# Patient Record
Sex: MALE | Race: WHITE | NOT HISPANIC OR LATINO | URBAN - METROPOLITAN AREA
[De-identification: names, ages, dates, MRNs, and addresses within clinical notes are randomized per-mention and may not be internally consistent; named-entity substitution may affect disease eponyms.]

---

## 2020-05-28 ENCOUNTER — EMERGENCY (EMERGENCY)
Facility: HOSPITAL | Age: 80
LOS: 1 days | Discharge: HOME | End: 2020-05-28
Attending: EMERGENCY MEDICINE | Admitting: EMERGENCY MEDICINE
Payer: MEDICARE

## 2020-05-28 VITALS
HEART RATE: 70 BPM | SYSTOLIC BLOOD PRESSURE: 209 MMHG | DIASTOLIC BLOOD PRESSURE: 100 MMHG | RESPIRATION RATE: 18 BRPM | OXYGEN SATURATION: 100 % | TEMPERATURE: 98 F

## 2020-05-28 VITALS — SYSTOLIC BLOOD PRESSURE: 169 MMHG | DIASTOLIC BLOOD PRESSURE: 78 MMHG

## 2020-05-28 LAB
APPEARANCE UR: CLEAR — SIGNIFICANT CHANGE UP
BACTERIA # UR AUTO: PRESENT /HPF
BILIRUB UR-MCNC: NEGATIVE — SIGNIFICANT CHANGE UP
COLOR SPEC: YELLOW — SIGNIFICANT CHANGE UP
DIFF PNL FLD: ABNORMAL
EPI CELLS # UR: SIGNIFICANT CHANGE UP /HPF (ref 0–5)
GLUCOSE UR QL: NEGATIVE — SIGNIFICANT CHANGE UP
KETONES UR-MCNC: NEGATIVE — SIGNIFICANT CHANGE UP
LEUKOCYTE ESTERASE UR-ACNC: NEGATIVE — SIGNIFICANT CHANGE UP
NITRITE UR-MCNC: NEGATIVE — SIGNIFICANT CHANGE UP
PH UR: 6 — SIGNIFICANT CHANGE UP (ref 5–8)
PROT UR-MCNC: 30 MG/DL
RBC CASTS # UR COMP ASSIST: ABNORMAL /HPF
SP GR SPEC: 1.01 — SIGNIFICANT CHANGE UP (ref 1–1.03)
UROBILINOGEN FLD QL: 0.2 E.U./DL — SIGNIFICANT CHANGE UP
WBC UR QL: < 5 /HPF — SIGNIFICANT CHANGE UP

## 2020-05-28 PROCEDURE — 99284 EMERGENCY DEPT VISIT MOD MDM: CPT

## 2020-05-28 RX ORDER — LIDOCAINE HCL 20 MG/ML
10 VIAL (ML) INJECTION ONCE
Refills: 0 | Status: COMPLETED | OUTPATIENT
Start: 2020-05-28 | End: 2020-05-28

## 2020-05-28 RX ORDER — CEFUROXIME AXETIL 250 MG
1 TABLET ORAL
Qty: 10 | Refills: 0
Start: 2020-05-28 | End: 2020-06-01

## 2020-05-28 RX ORDER — CEFUROXIME AXETIL 250 MG
250 TABLET ORAL ONCE
Refills: 0 | Status: COMPLETED | OUTPATIENT
Start: 2020-05-28 | End: 2020-05-28

## 2020-05-28 RX ADMIN — Medication 250 MILLIGRAM(S): at 04:47

## 2020-05-28 RX ADMIN — Medication 10 MILLILITER(S): at 04:26

## 2020-05-28 NOTE — ED ADULT TRIAGE NOTE - CHIEF COMPLAINT QUOTE
Pt complaining of urinary retention for the past five hours. PT states that he had a urolift procedure yesterday. Pt states that he took mirabegron for overreactive bladder which caused his blood pressure to be elevated and worsened retention. Pt took 2mg or enalapril before arrival.

## 2020-05-28 NOTE — ED PROVIDER NOTE - NSFOLLOWUPINSTRUCTIONS_ED_ALL_ED_FT
Follow up with your urologist  Take the antibiotics prescribed (Discuss with your urologist if you should continue with the antibiotics)  Return immediately for any new or worsening symptoms or any new concerns Follow up with your urologist (call today)  He/she will decide how long you need the banks catheter for  Take the antibiotics prescribed (Discuss with your urologist if you should continue with the antibiotics while having the banks cathether in)  Return immediately for any new or worsening symptoms or any new concerns

## 2020-05-28 NOTE — ED PROVIDER NOTE - PHYSICAL EXAMINATION
Physical Exam  GEN: Awake, alert, non-toxic appearing, NCAT  EYES: full EOMI,  ENT: External inspection normal, normal voice,   HEAD: atraumatic  NECK: FROM neck, supple, no meningismus, trachea midline, no JVD  RESP: no tachypnea, no hypoxia, no resp distress,  : distended bladder,   MSK: FROM all 4 extremities,

## 2020-05-28 NOTE — ED ADULT NURSE REASSESSMENT NOTE - NS ED NURSE REASSESS COMMENT FT1
Pt discharged with banks catheter in place, leg bag applied to right thigh. Teaching regarding care of leg bag and importance of urology follow up discussed. Pt verbalizes understanding, is due for follow-up tomorrow morning with urologist. Total amount of urine noted 350ml, now pink tinged. Md informed/aware.

## 2020-05-28 NOTE — ED ADULT NURSE REASSESSMENT NOTE - NS ED NURSE REASSESS COMMENT FT1
Placed indwelling Sanchez catheter, 14 [place] as per MD order. Drained 350ml clear yellow urine. Pt tolerated procedure well. BP reassessed after placement 169/78. Pt states he is more comfortable. Urine continuing to drain into collection bag. Comfort and safety measures in place and maintained.

## 2020-05-28 NOTE — ED ADULT NURSE NOTE - OBJECTIVE STATEMENT
79y male presents to ED for urinary retention. Pt states he has urinary retention for five hours PTA. Pt had urolift procedure done today. Pt took enalapril prior to arrival. Pt reporting sensation of fullness in bladder. PMH of HTN and BPH. A&Ox4.

## 2020-05-28 NOTE — ED PROVIDER NOTE - OBJECTIVE STATEMENT
79 yom pw urinary retention since around midnight.  sp urolift today.  pt also took mirabegron after procedure.  no fc, no flank pain, no nv.  hx of BPH.  reports had hematuria earlier but resolved.

## 2020-05-28 NOTE — ED PROVIDER NOTE - PATIENT PORTAL LINK FT
You can access the FollowMyHealth Patient Portal offered by [facility] by registering at the following website: [email or web address]. By joining [employer]’s FollowMyHealth portal, you will also be able to view your health information using other applications (apps) compatible with our system.

## 2020-05-29 LAB
CULTURE RESULTS: NO GROWTH — SIGNIFICANT CHANGE UP
SPECIMEN SOURCE: SIGNIFICANT CHANGE UP

## 2020-06-01 DIAGNOSIS — R33.9 RETENTION OF URINE, UNSPECIFIED: ICD-10-CM

## 2020-06-02 ENCOUNTER — EMERGENCY (EMERGENCY)
Facility: HOSPITAL | Age: 80
LOS: 1 days | Discharge: HOME | End: 2020-06-02
Attending: EMERGENCY MEDICINE | Admitting: EMERGENCY MEDICINE
Payer: MEDICARE

## 2020-06-02 VITALS
HEART RATE: 67 BPM | WEIGHT: 154.1 LBS | DIASTOLIC BLOOD PRESSURE: 107 MMHG | SYSTOLIC BLOOD PRESSURE: 176 MMHG | TEMPERATURE: 98 F | RESPIRATION RATE: 15 BRPM | OXYGEN SATURATION: 99 % | HEIGHT: 71 IN

## 2020-06-02 PROCEDURE — 99284 EMERGENCY DEPT VISIT MOD MDM: CPT

## 2020-06-02 RX ORDER — SOD SULF/SODIUM/NAHCO3/KCL/PEG
4000 SOLUTION, RECONSTITUTED, ORAL ORAL ONCE
Refills: 0 | Status: COMPLETED | OUTPATIENT
Start: 2020-06-02 | End: 2020-06-02

## 2020-06-02 RX ADMIN — Medication 4000 MILLILITER(S): at 19:21

## 2020-06-02 NOTE — ED PROVIDER NOTE - CLINICAL SUMMARY MEDICAL DECISION MAKING FREE TEXT BOX
Patient presenting with constipation x 1d. No abdo ttp. will d/c with [id number]. patient to take 1/4 of the prep. FU prn.

## 2020-06-02 NOTE — ED PROVIDER NOTE - NSFOLLOWUPINSTRUCTIONS_ED_ALL_ED_FT
Constipation    Constipation is when a person has fewer than three bowel movements a week, has difficulty having a bowel movement, or has stools that are dry, hard, or larger than normal. Other symptoms can include abdominal pain or bloating. As people grow older, constipation is more common. A low-fiber diet, not taking in enough fluids, and taking certain medicines, including opioid painkillers, may make constipation worse. Treatment varies but may include dietary modifications (more fiber-rich foods), lifestyle modifications, and possible medications.     SEEK IMMEDIATE MEDICAL CARE IF YOU HAVE ANY OF THE FOLLOWING SYMPTOMS: bright red blood in your stool, constipation for longer than 4 days, abdominal or rectal pain, unexplained weight loss, or inability to pass gas.     Please take 1/4-1/2 of the GoLYTELY bowel prep tonight.    Have a liquid dinner like soup o a smoothie.

## 2020-06-02 NOTE — ED PROVIDER NOTE - OBJECTIVE STATEMENT
79 M presenting with constipation x 1d. He normally has a large BM every day. today had a small one and a medium one and feels uncomfortable and nervous. + Bloated. Had a normal BM every ernst before today. He is s/p a Foly cath x 1d lat week for urinary ret since around midnight.  He had a Urolift last week. Now just taking Flomax and Finasteride. Says his urination os the best it has been in a year now.

## 2020-06-02 NOTE — ED ADULT TRIAGE NOTE - WEIGHT METHOD
"Chronic in nature. Stable. Patient states he sees commercials on TV for different medications for depression and wonders if he needs to switch what he is taking. Patient states he just feels \"blah\" but that's the norm. Patient denies suicidal or homicidal ideation.  " stated

## 2020-06-02 NOTE — ED PROVIDER NOTE - PATIENT PORTAL LINK FT
You can access the FollowMyHealth Patient Portal offered by [facility] by registering at the following website: [email or web address]. By joining [employer]’s FollowMyHealth portal, you will also be able to view your health information using other applications (apps) compatible with our system.

## 2020-06-03 PROBLEM — N40.0 BENIGN PROSTATIC HYPERPLASIA WITHOUT LOWER URINARY TRACT SYMPTOMS: Chronic | Status: ACTIVE | Noted: 2020-05-28

## 2020-06-06 DIAGNOSIS — Z79.899 OTHER LONG TERM (CURRENT) DRUG THERAPY: ICD-10-CM

## 2020-06-06 DIAGNOSIS — R14.0 ABDOMINAL DISTENSION (GASEOUS): ICD-10-CM

## 2020-06-06 DIAGNOSIS — K59.00 CONSTIPATION, UNSPECIFIED: ICD-10-CM

## 2020-06-06 DIAGNOSIS — R10.9 UNSPECIFIED ABDOMINAL PAIN: ICD-10-CM

## 2020-06-27 NOTE — ED ADULT NURSE NOTE - HOW OFTEN DO YOU HAVE A DRINK CONTAINING ALCOHOL?
Occupational Therapy   Initial Evaluation     Patient Name: Eb Poon  Age:  61 y.o., Sex:  male  Medical Record #: [id number]  Today's Date: 6/27/2020     Precautions  Precautions: Cardiac Precautions (See Comments)  Comments: pt's HR's in high 40's w/ activity and mid 50's at rest, baseline lightheadedness    Assessment  Pt seen post cardiac stent placement, able to demonstrate basic self care tasks, functional mobility and t/fs with supervision w/o AD, no lob noted. Discussed activity pacing and short breaks during ADLs. No further acute skilled OT services.       Plan    Recommend Occupational Therapy for Evaluation only f      Discharge recommendations:  Anticipate that the patient will have no further occupational therapy needs after discharge from the hospital.          Objective       06/27/20 1057   Prior Living Situation   Prior Services None   Housing / Facility 2 Story House   Bathroom Set up Walk In Shower;Bathtub / Shower Combination   Equipment Owned Front-Wheel Walker;Wheelchair;Tub / Shower Seat   Lives with - Patient's Self Care Capacity Spouse   Comments I with ADLs, limited mobiltiy and spouse assits as needed. Limited IADLs d/t cardiac issues    Prior Level of ADL Function   Self Feeding Independent   Grooming / Hygiene Independent   Bathing Independent   Dressing Independent   Toileting Independent   Prior Level of IADL Function   Medication Management Independent   Laundry Requires Assist   Kitchen Mobility Independent   Finances Independent   Home Management Requires Assist   Shopping Requires Assist   Prior Level Of Mobility Independent With Device in Home  (uses w/c for community)   Driving / Transportation Relatives / Others Provide Transportation   ADL Assessment   Eating Independent   Grooming Supervision;Standing   Bathing   (NT)   Upper Body Dressing Supervision   Lower Body Dressing Supervision   Toileting Supervision   Functional Mobility   Sit to Stand Supervised    Toilet Transfers Supervised   Comments c/o lightheadedness with increased activity. Per pt, this is his baseline and he knows when/how to pace it   Anticipated Discharge Equipment   DC Equipment None                  Never

## 2021-06-26 ENCOUNTER — EMERGENCY (EMERGENCY)
Facility: HOSPITAL | Age: 81
LOS: 1 days | Discharge: HOME | End: 2021-06-26
Attending: EMERGENCY MEDICINE | Admitting: EMERGENCY MEDICINE
Payer: MEDICARE

## 2021-06-26 ENCOUNTER — TRANSCRIPTION ENCOUNTER (OUTPATIENT)
Age: 81
End: 2021-06-26

## 2021-06-26 VITALS
SYSTOLIC BLOOD PRESSURE: 197 MMHG | TEMPERATURE: 98 F | OXYGEN SATURATION: 99 % | DIASTOLIC BLOOD PRESSURE: 100 MMHG | RESPIRATION RATE: 16 BRPM | HEART RATE: 57 BPM

## 2021-06-26 VITALS
OXYGEN SATURATION: 98 % | TEMPERATURE: 98 F | RESPIRATION RATE: 18 BRPM | HEART RATE: 68 BPM | DIASTOLIC BLOOD PRESSURE: 93 MMHG | SYSTOLIC BLOOD PRESSURE: 155 MMHG | WEIGHT: 132.94 LBS | HEIGHT: 71 IN

## 2021-06-26 DIAGNOSIS — R33.9 RETENTION OF URINE, UNSPECIFIED: ICD-10-CM

## 2021-06-26 DIAGNOSIS — I10 ESSENTIAL (PRIMARY) HYPERTENSION: ICD-10-CM

## 2021-06-26 DIAGNOSIS — K59.00 CONSTIPATION, UNSPECIFIED: ICD-10-CM

## 2021-06-26 DIAGNOSIS — N40.0 BENIGN PROSTATIC HYPERPLASIA WITHOUT LOWER URINARY TRACT SYMPTOMS: ICD-10-CM

## 2021-06-26 LAB
ALBUMIN SERPL ELPH-MCNC: 3.9 G/DL — SIGNIFICANT CHANGE UP (ref 3.4–5)
ALP SERPL-CCNC: 66 U/L — SIGNIFICANT CHANGE UP (ref 40–120)
ALT FLD-CCNC: 26 U/L — SIGNIFICANT CHANGE UP (ref 12–42)
ANION GAP SERPL CALC-SCNC: 4 MMOL/L — LOW (ref 9–16)
APPEARANCE UR: CLEAR — SIGNIFICANT CHANGE UP
AST SERPL-CCNC: 19 U/L — SIGNIFICANT CHANGE UP (ref 15–37)
BILIRUB SERPL-MCNC: 0.8 MG/DL — SIGNIFICANT CHANGE UP (ref 0.2–1.2)
BILIRUB UR-MCNC: NEGATIVE — SIGNIFICANT CHANGE UP
BUN SERPL-MCNC: 13 MG/DL — SIGNIFICANT CHANGE UP (ref 7–23)
CALCIUM SERPL-MCNC: 9.4 MG/DL — SIGNIFICANT CHANGE UP (ref 8.5–10.5)
CHLORIDE SERPL-SCNC: 93 MMOL/L — LOW (ref 96–108)
CO2 SERPL-SCNC: 30 MMOL/L — SIGNIFICANT CHANGE UP (ref 22–31)
COLOR SPEC: YELLOW — SIGNIFICANT CHANGE UP
CREAT SERPL-MCNC: 1.11 MG/DL — SIGNIFICANT CHANGE UP (ref 0.5–1.3)
DIFF PNL FLD: ABNORMAL
EPI CELLS # UR: SIGNIFICANT CHANGE UP /HPF (ref 0–5)
GLUCOSE SERPL-MCNC: 97 MG/DL — SIGNIFICANT CHANGE UP (ref 70–99)
GLUCOSE UR QL: NEGATIVE — SIGNIFICANT CHANGE UP
HCT VFR BLD CALC: 37.7 % — LOW (ref 39–50)
HGB BLD-MCNC: 13.6 G/DL — SIGNIFICANT CHANGE UP (ref 13–17)
KETONES UR-MCNC: NEGATIVE — SIGNIFICANT CHANGE UP
LEUKOCYTE ESTERASE UR-ACNC: NEGATIVE — SIGNIFICANT CHANGE UP
MCHC RBC-ENTMCNC: 34.1 PG — HIGH (ref 27–34)
MCHC RBC-ENTMCNC: 36.1 GM/DL — HIGH (ref 32–36)
MCV RBC AUTO: 94.5 FL — SIGNIFICANT CHANGE UP (ref 80–100)
NITRITE UR-MCNC: NEGATIVE — SIGNIFICANT CHANGE UP
NRBC # BLD: 0 /100 WBCS — SIGNIFICANT CHANGE UP (ref 0–0)
PH UR: 5.5 — SIGNIFICANT CHANGE UP (ref 5–8)
PLATELET # BLD AUTO: 215 K/UL — SIGNIFICANT CHANGE UP (ref 150–400)
POTASSIUM SERPL-MCNC: 5.1 MMOL/L — SIGNIFICANT CHANGE UP (ref 3.5–5.3)
POTASSIUM SERPL-SCNC: 5.1 MMOL/L — SIGNIFICANT CHANGE UP (ref 3.5–5.3)
PROT SERPL-MCNC: 6.6 G/DL — SIGNIFICANT CHANGE UP (ref 6.4–8.2)
PROT UR-MCNC: NEGATIVE MG/DL — SIGNIFICANT CHANGE UP
RBC # BLD: 3.99 M/UL — LOW (ref 4.2–5.8)
RBC # FLD: 11.9 % — SIGNIFICANT CHANGE UP (ref 10.3–14.5)
RBC CASTS # UR COMP ASSIST: < 5 /HPF — SIGNIFICANT CHANGE UP
SODIUM SERPL-SCNC: 127 MMOL/L — LOW (ref 132–145)
SP GR SPEC: 1.01 — SIGNIFICANT CHANGE UP (ref 1–1.03)
UROBILINOGEN FLD QL: 0.2 E.U./DL — SIGNIFICANT CHANGE UP
WBC # BLD: 6.26 K/UL — SIGNIFICANT CHANGE UP (ref 3.8–10.5)
WBC # FLD AUTO: 6.26 K/UL — SIGNIFICANT CHANGE UP (ref 3.8–10.5)

## 2021-06-26 PROCEDURE — 99284 EMERGENCY DEPT VISIT MOD MDM: CPT

## 2021-06-26 RX ORDER — POLYETHYLENE GLYCOL 3350 17 G/17G
17 POWDER, FOR SOLUTION ORAL ONCE
Refills: 0 | Status: COMPLETED | OUTPATIENT
Start: 2021-06-26 | End: 2021-06-26

## 2021-06-26 RX ORDER — SODIUM CHLORIDE 9 MG/ML
1000 INJECTION INTRAMUSCULAR; INTRAVENOUS; SUBCUTANEOUS ONCE
Refills: 0 | Status: COMPLETED | OUTPATIENT
Start: 2021-06-26 | End: 2021-06-26

## 2021-06-26 RX ORDER — PHENAZOPYRIDINE HCL 100 MG
200 TABLET ORAL ONCE
Refills: 0 | Status: COMPLETED | OUTPATIENT
Start: 2021-06-26 | End: 2021-06-26

## 2021-06-26 RX ADMIN — SODIUM CHLORIDE 1000 MILLILITER(S): 9 INJECTION INTRAMUSCULAR; INTRAVENOUS; SUBCUTANEOUS at 14:09

## 2021-06-26 RX ADMIN — POLYETHYLENE GLYCOL 3350 17 GRAM(S): 17 POWDER, FOR SOLUTION ORAL at 13:05

## 2021-06-26 RX ADMIN — Medication 200 MILLIGRAM(S): at 13:05

## 2021-06-26 NOTE — ED ADULT NURSE NOTE - NSIMPLEMENTINTERV_GEN_ALL_ED
Implemented All Universal Safety Interventions:  [place] to call system. Call bell, personal items and telephone within reach. Instruct patient to call for assistance. Room bathroom lighting operational. Non-slip footwear when patient is off stretcher. Physically safe environment: no spills, clutter or unnecessary equipment. Stretcher in lowest position, wheels locked, appropriate side rails in place.

## 2021-06-26 NOTE — ED PROVIDER NOTE - CLINICAL SUMMARY MEDICAL DECISION MAKING FREE TEXT BOX
Patient presents with constipation & urinary retention.  Labs unremarkable.  Sanchez catheter placed with moderate amount of urine.  UA ordered & urine culture.  Rectal exam done and no fecal impaction noted.  Enema given with minimal relief.

## 2021-06-26 NOTE — ED PROVIDER NOTE - GASTROINTESTINAL, MLM
Abdomen soft, non-distended.  Minimally tender suprapubic region & RLQ, but no rebound or guarding.  No CVA tenderness.

## 2021-06-26 NOTE — ED PROVIDER NOTE - OBJECTIVE STATEMENT
He states he hasn't had a BM x 2 days, and today, he is having urinary retention and hasn't voided since 7am.  He has a history of known BPH but no history of urinary retention in the past.  He does have a history of chronic constipation.  He has mild intermittent abdominal cramping and he feels a little bloated.  No hematochezia or melena.  No fevers/chills.  No nausea or vomiting but appetite has been poor. He did take a prescription muscle relaxant, which could have triggered the bladder issue.  He denies any significant neck or back pain.  No dysuria or hematuria.  No hx of KS or pyelo.

## 2021-06-26 NOTE — ED ADULT NURSE REASSESSMENT NOTE - NS ED NURSE REASSESS COMMENT FT1
Pt. given half of soap ankit enema, unable to hold in, pushed out most of the enema and asked for a break before attempting the second half.

## 2021-06-26 NOTE — ED ADULT TRIAGE NOTE - CHIEF COMPLAINT QUOTE
Patient to ED with complaint of constipation and urinary retention.  States no BM in 2 days and has not urinated since 7am

## 2021-06-26 NOTE — ED PROVIDER NOTE - NSFOLLOWUPINSTRUCTIONS_ED_ALL_ED_FT
You were treated for ACUTE URINARY RETENTION & ACUTE/CHRONIC CONSTIPATION.    Acute urinary retention is a condition in which a person is unable to pass urine. This can last for a short time or for a long time. If left untreated, it can result in kidney damage or other serious complications.     What are the causes?  This condition may be caused by:    Obstruction or narrowing of the tube that drains the bladder (urethra). This may be caused by surgery or problems with nearby organs, such as the prostate gland, which can press or squeeze the urethra.  Problems with the nerves in the bladder. These can be caused by diseases, such as multiple sclerosis, or by spinal cord injuries.  Certain medicines.  Tumors in the area of the pelvis, bladder, or urethra.  Diabetes.  Degenerative cognitive conditions such as delirium or dementia.  Bladder or urinary tract infection.  Constipation.  Blood in the urine (hematuria).  Injury to the bladder or urethra.   Psychological (psychogenic) conditions. Someone may hold his urine due to trauma or because he does not want to use the bathroom.    What increases the risk?  This condition is more likely to develop in older men. As men age, their prostate may become larger and may start pressing or squeezing on the bladder or the urethra.    What are the signs or symptoms?  Symptoms of this condition include:    Trouble urinating.  Pain in the lower abdomen.    Symptoms usually come on slowly over a long period of time.    How is this diagnosed?  This condition is diagnosed based on a physical exam and a medical history. You may also have other tests, including:    An ultrasound of the bladder or kidneys or both.  Blood tests.  A urine analysis.  Additional tests may be needed such as an MRI, kidney, or bladder function tests.    How is this treated?  Treatment for this condition may include:    Medicines.  Placing a thin, sterile tube (catheter) into the bladder to drain urine out of the body. This is called an indwelling urinary catheter. After being inserted, the catheter is held in place with a small balloon that is filled with sterile water. Urine drains from the catheter into a collection bag outside of the body.  Behavioral therapy.  Treatment for any underlying conditions.  If needed, you may be treated in the hospital for kidney function problems or to manage other complications.    Follow these instructions at home:  Take over-the-counter and prescription medicines only as told by your health care provider. Avoid certain medicines, such as decongestants, antihistamines, and some prescription medicines. Do not take any medicine unless your health care provider has approved.  If you were given an indwelling urinary catheter, take care of it as told by your health care provider.  Drink enough fluid to keep your urine clear or pale yellow.  If you were prescribed an antibiotic, take it as told by your health care provider. Do not stop taking the antibiotic even if you start to feel better.  Do not use any products that contain nicotine or tobacco, such as cigarettes and e-cigarettes. If you need help quitting, ask your health care provider.  Monitor any changes in your symptoms. Tell your health care provider about any changes.  If instructed, monitor your blood pressure at home. Report changes as told by your health care provider.  Keep all follow-up visits as told by your health care provider. This is important.    Contact a health care provider if:  You have uncomfortable bladder contractions that you cannot control (spasms) or you leak urine with the spasms.    Get help right away if:  You have chills or fever.  You have blood in your urine.  You have a catheter and:  Your catheter stops draining urine.  Your catheter falls out.    Summary  Acute urinary retention is a condition in which a person is unable to pass urine. If left untreated, it can result in kidney damage or other serious complications.  The cause of this condition may include an enlarged prostate. As men age, their prostate gland may become larger and may start pressing or squeezing on the bladder or the urethra.  Treatment for this condition may include medicines and placement of an indwelling urinary catheter.  Monitor any changes in your symptoms. Tell your health care provider about any changes.    Chronic constipation is a condition in which a person has three or fewer bowel movements a week, for three months or longer. This condition is especially common in older adults.    The two main kinds of chronic constipation are secondary constipation and functional constipation. Secondary constipation results from another condition or a treatment. Functional constipation, also called primary or idiopathic constipation, is divided into three types:     Normal transit constipation. In this type, movement of stool through the colon (stool transit) occurs normally.  Slow transit constipation. In this type, stool moves slowly through the colon.  Outlet constipation or pelvic floor dysfunction. In this type, the nerves and muscles that empty the rectum do not work normally.    What are the causes?  Causes of secondary constipation may include:    Failing to drink enough fluid, eat enough food or fiber, or get physically active.  Pregnancy.  A tear in the anus (anal fissure).  Blockage in the bowel (bowel obstruction).  Narrowing of the bowel (bowel stricture).  Having a long-term medical condition, such as:  Diabetes.  Hypothyroidism.  Multiple sclerosis.  Parkinson disease.  Stroke.  Spinal cord injury.  Dementia.  Colon cancer.  Inflammatory bowel disease (IBD).  Iron-deficiency anemia.  Outward collapse of the rectum (rectal prolapse).  Hemorrhoids.  Taking certain medicines, including:  Narcotics. These are a certain type of prescription pain medicine.  Antacids.  Iron supplements.  Water pills (diuretics).  Certain blood pressure medicines.  Anti-seizure medicines.  Antidepressants.  Medicines for Parkinson disease.    The cause of functional constipation is not known, but some conditions are associated with it. These conditions include:    Stress.  Problems in the nerves and muscles that control stool transit.  Weak or impaired pelvic floor muscles.    What increases the risk?  You may be at higher risk for chronic constipation if you:    Are older than age 70.  Are female.  Live in a long-term care facility.  Do not get much exercise or physical activity (have a sedentary lifestyle).  Do not drink enough fluids.  Do not eat enough food, especially fiber.  Have a long-term disease.  Have a mental health disorder or eating disorder.  Take many medicines.    What are the signs or symptoms?  The main symptom of chronic constipation is having three or fewer bowel movements a week for several weeks. Other signs and symptoms may vary from person to person. These include:    Pushing hard (straining) to pass stool.  Painful bowel movements.  Having hard or lumpy stools.  Having lower belly discomfort, such as cramps or bloating.  Being unable to have a bowel movement when you feel the urge.  Feeling like you still need to pass stool after a bowel movement.  Feeling that you have something in your rectum that is blocking or preventing bowel movements.  Seeing blood on the toilet paper or in your stool.  Worsening confusion (in older adults).    How is this diagnosed?  This condition may be diagnosed based on:    Symptoms and medical history. You will be asked about your symptoms, lifestyle, diet, and any medicines that you are taking.  Physical exam.   Your belly (abdomen) will be examined.   A digital rectal exam may be done. For this exam, a health care provider places a lubricated, gloved finger into the rectum.  Other tests to check for any underlying causes of your constipation. These may be ordered if you have bleeding in your rectum, weight loss, or a family history of colon cancer. In these cases, you may have:  Imaging studies of the colon. These may include X-ray, ultrasound, or CT scan.  Blood tests.  A procedure to examine the inside of your colon (colonoscopy).  More specialized tests to check:  Whether your anal sphincter works well. This is a ring-shaped muscle that controls the closing of the anus.  How well food moves through your colon.  Tests to measure the nerve signal in your pelvic floor muscles (electromyography).    How is this treated?  Treatment for chronic constipation depends on the cause. Most often, treatment starts with:    Being more active and getting regular exercise.  Drinking more fluids.  Adding fiber to your diet. Sources of fiber include fruits, vegetables, whole grains, and fiber supplements.  Using medicines such as stool softeners or medicines that increase contractions in your digestive system (pro-motility agents).  Training your pelvic muscles with biofeedback.  Surgery, if there is obstruction.    Treatment for secondary chronic constipation depends on the underlying condition. You may need to:    Stop or change some medicines if they cause constipation.  Use a fiber supplement (bulk laxative) or stool softener.  Use prescription laxative. This works by absorbing water into your colon (osmotic laxative).    You may also need to see a specialist who treats conditions of the digestive system (gastroenterologist).     Follow these instructions at home:  Take over-the-counter and prescription medicines only as told by your health care provider.  If you are taking a laxative, take it as told by your health care provider.  Eat a balanced diet that includes enough fiber. Ask your health care provider to recommend a diet that is right for you.  Drink clear fluids, especially water. Avoid drinking alcohol, caffeine, and soda.  Drink enough fluid to keep your urine pale yellow.  Get some physical activity every day. Ask your health care provider what physical activities are safe for you.   Get colon cancer screenings as told by your health care provider.  Keep all follow-up visits as told by your health care provider. This is important.     Contact a health care provider if:  You are having three or fewer bowel movements a week.  Your stools are hard or lumpy.  You notice blood on the toilet paper or in your stool after you have a bowel movement.  You have unexplained weight loss.  You have rectum (rectal) pain.  You have stool leakage.  You experience nausea or vomiting.    Get help right away if:  You have rectal bleeding or you pass blood clots.  You have severe rectal pain.  You have body tissue that pushes out (protrudes) from your anus.  You have severe pain or bloating (distension) in your abdomen.  You have vomiting that you cannot control.    Summary  Chronic constipation is a condition in which a person has three or fewer bowel movements a week, for three months or longer.  You may have a higher risk for this condition if you are an older adult, or if you do not drink enough water or get enough physical activity (are sedentary).  Treatment for this condition depends on the cause. Most treatments for chronic constipation include adding fiber to your diet, drinking more fluids, and getting more physical activity. You may also need to treat any underlying medical conditions or stop or change certain medicines if they cause constipation.  If lifestyle changes do not relieve constipation, your health care provider may recommend taking a laxative.

## 2021-06-26 NOTE — ED PROVIDER NOTE - PATIENT PORTAL LINK FT
You can access the FollowMyHealth Patient Portal offered by [facility] by registering at the following website: [email or web address]. By joining [employer]’s FollowMyHealth portal, you will also be able to view your health information using other applications (apps) compatible with our system.

## 2021-08-02 ENCOUNTER — EMERGENCY (EMERGENCY)
Facility: HOSPITAL | Age: 81
LOS: 1 days | Discharge: HOME | End: 2021-08-02
Attending: EMERGENCY MEDICINE | Admitting: EMERGENCY MEDICINE
Payer: MEDICARE

## 2021-08-02 VITALS
DIASTOLIC BLOOD PRESSURE: 96 MMHG | OXYGEN SATURATION: 99 % | HEART RATE: 44 BPM | SYSTOLIC BLOOD PRESSURE: 177 MMHG | RESPIRATION RATE: 17 BRPM | TEMPERATURE: 98 F

## 2021-08-02 VITALS
TEMPERATURE: 98 F | HEIGHT: 71 IN | RESPIRATION RATE: 18 BRPM | DIASTOLIC BLOOD PRESSURE: 110 MMHG | SYSTOLIC BLOOD PRESSURE: 185 MMHG | HEART RATE: 50 BPM | OXYGEN SATURATION: 98 % | WEIGHT: 136.03 LBS

## 2021-08-02 DIAGNOSIS — K64.9 UNSPECIFIED HEMORRHOIDS: ICD-10-CM

## 2021-08-02 DIAGNOSIS — R10.30 LOWER ABDOMINAL PAIN, UNSPECIFIED: ICD-10-CM

## 2021-08-02 DIAGNOSIS — Z86.73 PERSONAL HISTORY OF TRANSIENT ISCHEMIC ATTACK (TIA), AND CEREBRAL INFARCTION WITHOUT RESIDUAL DEFICITS: ICD-10-CM

## 2021-08-02 DIAGNOSIS — K57.90 DIVERTICULOSIS OF INTESTINE, PART UNSPECIFIED, WITHOUT PERFORATION OR ABSCESS WITHOUT BLEEDING: ICD-10-CM

## 2021-08-02 DIAGNOSIS — N40.0 BENIGN PROSTATIC HYPERPLASIA WITHOUT LOWER URINARY TRACT SYMPTOMS: ICD-10-CM

## 2021-08-02 DIAGNOSIS — K57.32 DIVERTICULITIS OF LARGE INTESTINE WITHOUT PERFORATION OR ABSCESS WITHOUT BLEEDING: ICD-10-CM

## 2021-08-02 DIAGNOSIS — I10 ESSENTIAL (PRIMARY) HYPERTENSION: ICD-10-CM

## 2021-08-02 LAB
ALBUMIN SERPL ELPH-MCNC: 3.8 G/DL — SIGNIFICANT CHANGE UP (ref 3.4–5)
ALP SERPL-CCNC: 88 U/L — SIGNIFICANT CHANGE UP (ref 40–120)
ALT FLD-CCNC: 27 U/L — SIGNIFICANT CHANGE UP (ref 12–42)
ANION GAP SERPL CALC-SCNC: 8 MMOL/L — LOW (ref 9–16)
APPEARANCE UR: CLEAR — SIGNIFICANT CHANGE UP
AST SERPL-CCNC: 31 U/L — SIGNIFICANT CHANGE UP (ref 15–37)
BASOPHILS # BLD AUTO: 0.04 K/UL — SIGNIFICANT CHANGE UP (ref 0–0.2)
BASOPHILS NFR BLD AUTO: 0.7 % — SIGNIFICANT CHANGE UP (ref 0–2)
BILIRUB SERPL-MCNC: 0.8 MG/DL — SIGNIFICANT CHANGE UP (ref 0.2–1.2)
BILIRUB UR-MCNC: NEGATIVE — SIGNIFICANT CHANGE UP
BUN SERPL-MCNC: 11 MG/DL — SIGNIFICANT CHANGE UP (ref 7–23)
CALCIUM SERPL-MCNC: 9.4 MG/DL — SIGNIFICANT CHANGE UP (ref 8.5–10.5)
CHLORIDE SERPL-SCNC: 93 MMOL/L — LOW (ref 96–108)
CO2 SERPL-SCNC: 25 MMOL/L — SIGNIFICANT CHANGE UP (ref 22–31)
COLOR SPEC: YELLOW — SIGNIFICANT CHANGE UP
CREAT SERPL-MCNC: 0.97 MG/DL — SIGNIFICANT CHANGE UP (ref 0.5–1.3)
DIFF PNL FLD: NEGATIVE — SIGNIFICANT CHANGE UP
EOSINOPHIL # BLD AUTO: 0.11 K/UL — SIGNIFICANT CHANGE UP (ref 0–0.5)
EOSINOPHIL NFR BLD AUTO: 1.8 % — SIGNIFICANT CHANGE UP (ref 0–6)
GLUCOSE SERPL-MCNC: 105 MG/DL — HIGH (ref 70–99)
GLUCOSE UR QL: NEGATIVE — SIGNIFICANT CHANGE UP
HCT VFR BLD CALC: 41.3 % — SIGNIFICANT CHANGE UP (ref 39–50)
HGB BLD-MCNC: 14.7 G/DL — SIGNIFICANT CHANGE UP (ref 13–17)
IMM GRANULOCYTES NFR BLD AUTO: 0.7 % — SIGNIFICANT CHANGE UP (ref 0–1.5)
KETONES UR-MCNC: NEGATIVE — SIGNIFICANT CHANGE UP
LEUKOCYTE ESTERASE UR-ACNC: NEGATIVE — SIGNIFICANT CHANGE UP
LIDOCAIN IGE QN: 106 U/L — SIGNIFICANT CHANGE UP (ref 73–393)
LYMPHOCYTES # BLD AUTO: 1.23 K/UL — SIGNIFICANT CHANGE UP (ref 1–3.3)
LYMPHOCYTES # BLD AUTO: 20.3 % — SIGNIFICANT CHANGE UP (ref 13–44)
MCHC RBC-ENTMCNC: 34 PG — SIGNIFICANT CHANGE UP (ref 27–34)
MCHC RBC-ENTMCNC: 35.6 GM/DL — SIGNIFICANT CHANGE UP (ref 32–36)
MCV RBC AUTO: 95.6 FL — SIGNIFICANT CHANGE UP (ref 80–100)
MONOCYTES # BLD AUTO: 0.41 K/UL — SIGNIFICANT CHANGE UP (ref 0–0.9)
MONOCYTES NFR BLD AUTO: 6.8 % — SIGNIFICANT CHANGE UP (ref 2–14)
NEUTROPHILS # BLD AUTO: 4.24 K/UL — SIGNIFICANT CHANGE UP (ref 1.8–7.4)
NEUTROPHILS NFR BLD AUTO: 69.7 % — SIGNIFICANT CHANGE UP (ref 43–77)
NITRITE UR-MCNC: NEGATIVE — SIGNIFICANT CHANGE UP
NRBC # BLD: 0 /100 WBCS — SIGNIFICANT CHANGE UP (ref 0–0)
PH UR: 7 — SIGNIFICANT CHANGE UP (ref 5–8)
PLATELET # BLD AUTO: 260 K/UL — SIGNIFICANT CHANGE UP (ref 150–400)
POTASSIUM SERPL-MCNC: 4.5 MMOL/L — SIGNIFICANT CHANGE UP (ref 3.5–5.3)
POTASSIUM SERPL-SCNC: 4.5 MMOL/L — SIGNIFICANT CHANGE UP (ref 3.5–5.3)
PROT SERPL-MCNC: 7.2 G/DL — SIGNIFICANT CHANGE UP (ref 6.4–8.2)
PROT UR-MCNC: NEGATIVE MG/DL — SIGNIFICANT CHANGE UP
RBC # BLD: 4.32 M/UL — SIGNIFICANT CHANGE UP (ref 4.2–5.8)
RBC # FLD: 12 % — SIGNIFICANT CHANGE UP (ref 10.3–14.5)
SODIUM SERPL-SCNC: 126 MMOL/L — LOW (ref 132–145)
SP GR SPEC: 1.01 — SIGNIFICANT CHANGE UP (ref 1–1.03)
UROBILINOGEN FLD QL: 0.2 E.U./DL — SIGNIFICANT CHANGE UP
WBC # BLD: 6.07 K/UL — SIGNIFICANT CHANGE UP (ref 3.8–10.5)
WBC # FLD AUTO: 6.07 K/UL — SIGNIFICANT CHANGE UP (ref 3.8–10.5)

## 2021-08-02 PROCEDURE — 99284 EMERGENCY DEPT VISIT MOD MDM: CPT

## 2021-08-02 PROCEDURE — 74177 CT ABD & PELVIS W/CONTRAST: CPT | Mod: 26

## 2021-08-02 RX ORDER — SODIUM CHLORIDE 9 MG/ML
1000 INJECTION INTRAMUSCULAR; INTRAVENOUS; SUBCUTANEOUS ONCE
Refills: 0 | Status: COMPLETED | OUTPATIENT
Start: 2021-08-02 | End: 2021-08-02

## 2021-08-02 RX ORDER — IOHEXOL 300 MG/ML
30 INJECTION, SOLUTION INTRAVENOUS ONCE
Refills: 0 | Status: COMPLETED | OUTPATIENT
Start: 2021-08-02 | End: 2021-08-02

## 2021-08-02 RX ORDER — MAGNESIUM HYDROXIDE 400 MG/1
30 TABLET, CHEWABLE ORAL ONCE
Refills: 0 | Status: COMPLETED | OUTPATIENT
Start: 2021-08-02 | End: 2021-08-02

## 2021-08-02 RX ORDER — METRONIDAZOLE 500 MG
1 TABLET ORAL
Qty: 30 | Refills: 0
Start: 2021-08-02 | End: 2021-08-11

## 2021-08-02 RX ORDER — ONDANSETRON 8 MG/1
4 TABLET, FILM COATED ORAL ONCE
Refills: 0 | Status: COMPLETED | OUTPATIENT
Start: 2021-08-02 | End: 2021-08-02

## 2021-08-02 RX ORDER — METRONIDAZOLE 500 MG
500 TABLET ORAL ONCE
Refills: 0 | Status: DISCONTINUED | OUTPATIENT
Start: 2021-08-02 | End: 2021-08-02

## 2021-08-02 RX ORDER — FAMOTIDINE 10 MG/ML
20 INJECTION INTRAVENOUS ONCE
Refills: 0 | Status: COMPLETED | OUTPATIENT
Start: 2021-08-02 | End: 2021-08-02

## 2021-08-02 RX ADMIN — FAMOTIDINE 20 MILLIGRAM(S): 10 INJECTION INTRAVENOUS at 17:01

## 2021-08-02 RX ADMIN — ONDANSETRON 4 MILLIGRAM(S): 8 TABLET, FILM COATED ORAL at 17:01

## 2021-08-02 RX ADMIN — MAGNESIUM HYDROXIDE 30 MILLILITER(S): 400 TABLET, CHEWABLE ORAL at 17:01

## 2021-08-02 RX ADMIN — IOHEXOL 30 MILLILITER(S): 300 INJECTION, SOLUTION INTRAVENOUS at 17:01

## 2021-08-02 RX ADMIN — SODIUM CHLORIDE 1000 MILLILITER(S): 9 INJECTION INTRAMUSCULAR; INTRAVENOUS; SUBCUTANEOUS at 17:03

## 2021-08-02 NOTE — ED PROVIDER NOTE - CLINICAL SUMMARY MEDICAL DECISION MAKING FREE TEXT BOX
79 y/o M presents to the ED complaining of abdominal pain today. Patient appears cachetic upon examination. Additionally, Patient has LLQ tenderness. Will order CT abdominal scan with IV and oral contrast, administer medication for pain, and order basic labs. Will re-assess.

## 2021-08-02 NOTE — ED PROVIDER NOTE - PATIENT PORTAL LINK FT
You can access the FollowMyHealth Patient Portal offered by [facility] by registering at the following website: [email or web address]. By joining [employer]’s FollowMyHealth portal, you will also be able to view your health information using other applications (apps) compatible with our system.

## 2021-08-02 NOTE — ED PROVIDER NOTE - PROGRESS NOTE DETAILS
Pt asking to leave, improved, I reviewed the results. Precautions reviewed. he will follow up with his GI in 1-2 days.

## 2021-08-02 NOTE — ED ADULT TRIAGE NOTE - CHIEF COMPLAINT QUOTE
abd pain x 3 days. Denies having a normal BM, states "had stone like poop and bright red blood yesterday."  hx of IBS

## 2021-08-02 NOTE — ED ADULT NURSE NOTE - NSIMPLEMENTINTERV_GEN_ALL_ED
Implemented All Universal Safety Interventions:  [place] to call system. Call bell, personal items and telephone within reach. Instruct patient to call for assistance. Room bathroom lighting operational. Non-slip footwear when patient is off stretcher. Physically safe environment: no spills, clutter or unnecessary equipment. Stretcher in lowest position, wheels locked, appropriate side rails in place.

## 2021-08-02 NOTE — ED PROVIDER NOTE - CONSTITUTIONAL, MLM
normal... Well appearing, awake, alert, oriented to person, place, time/situation and in no apparent distress. Patient appears cachectic.

## 2021-08-02 NOTE — ED PROVIDER NOTE - OBJECTIVE STATEMENT
79 y/o M with PMHx of AVCS, hemorrhoids, diverticulosis and HTN presents to the ED complaining of abdominal pain today. Patient reports that his last BM was yesterday, and claims there was blood in his stool. Patient states that he has severe lower abdominal pain. He denies diarrhea, nausea, vomiting, fever, or chills. Patient endorses weight loss from 155 lbs to 133 lbs during this past year. Patient states he is compliant with all of his prescribed medication and denies taking blood thinners.

## 2021-08-21 ENCOUNTER — EMERGENCY (EMERGENCY)
Facility: HOSPITAL | Age: 81
LOS: 1 days | Discharge: HOME | End: 2021-08-21
Attending: EMERGENCY MEDICINE | Admitting: EMERGENCY MEDICINE
Payer: MEDICARE

## 2021-08-21 VITALS
OXYGEN SATURATION: 98 % | SYSTOLIC BLOOD PRESSURE: 154 MMHG | WEIGHT: 132.94 LBS | TEMPERATURE: 98 F | HEART RATE: 65 BPM | DIASTOLIC BLOOD PRESSURE: 85 MMHG | RESPIRATION RATE: 18 BRPM | HEIGHT: 71 IN

## 2021-08-21 DIAGNOSIS — I10 ESSENTIAL (PRIMARY) HYPERTENSION: ICD-10-CM

## 2021-08-21 DIAGNOSIS — Z79.83 LONG TERM (CURRENT) USE OF BISPHOSPHONATES: ICD-10-CM

## 2021-08-21 DIAGNOSIS — R33.9 RETENTION OF URINE, UNSPECIFIED: ICD-10-CM

## 2021-08-21 DIAGNOSIS — Z90.49 ACQUIRED ABSENCE OF OTHER SPECIFIED PARTS OF DIGESTIVE TRACT: ICD-10-CM

## 2021-08-21 DIAGNOSIS — N04.1 NEPHROTIC SYNDROME WITH FOCAL AND SEGMENTAL GLOMERULAR LESIONS: ICD-10-CM

## 2021-08-21 PROBLEM — K57.90 DIVERTICULOSIS OF INTESTINE, PART UNSPECIFIED, WITHOUT PERFORATION OR ABSCESS WITHOUT BLEEDING: Chronic | Status: ACTIVE | Noted: 2021-08-02

## 2021-08-21 PROBLEM — K64.9 UNSPECIFIED HEMORRHOIDS: Chronic | Status: ACTIVE | Noted: 2021-08-02

## 2021-08-21 LAB
APPEARANCE UR: CLEAR — SIGNIFICANT CHANGE UP
BILIRUB UR-MCNC: NEGATIVE — SIGNIFICANT CHANGE UP
COLOR SPEC: YELLOW — SIGNIFICANT CHANGE UP
DIFF PNL FLD: NEGATIVE — SIGNIFICANT CHANGE UP
GLUCOSE UR QL: NEGATIVE — SIGNIFICANT CHANGE UP
KETONES UR-MCNC: NEGATIVE — SIGNIFICANT CHANGE UP
LEUKOCYTE ESTERASE UR-ACNC: NEGATIVE — SIGNIFICANT CHANGE UP
NITRITE UR-MCNC: NEGATIVE — SIGNIFICANT CHANGE UP
PH UR: 7 — SIGNIFICANT CHANGE UP (ref 5–8)
PROT UR-MCNC: NEGATIVE MG/DL — SIGNIFICANT CHANGE UP
SP GR SPEC: 1.01 — SIGNIFICANT CHANGE UP (ref 1–1.03)
UROBILINOGEN FLD QL: 0.2 E.U./DL — SIGNIFICANT CHANGE UP

## 2021-08-21 PROCEDURE — 99283 EMERGENCY DEPT VISIT LOW MDM: CPT

## 2021-08-21 NOTE — ED PROVIDER NOTE - CLINICAL SUMMARY MEDICAL DECISION MAKING FREE TEXT BOX
Patient presents with recurrent urinary retention in the setting of chronic BPH.  Straight cath done, but patient did not want indwelling catheter.  He feels like he will still be able to void on his own, and he will follow up with Urology ASAP.  UA without evidence of UTI.

## 2021-08-21 NOTE — ED PROVIDER NOTE - NSICDXPASTMEDICALHX_GEN_ALL_CORE_FT
Accompanied by parent    Kike is a 5 year old female who presents to walk in [facility] with complains of upper respiratory symptoms.  Symptoms have been present for for 2 days.  Main symptoms include cough, and some congestion and drainage.  Denies high fever, significant headache, no visual complaints, no hemoptysis or wheezing.  Patient denies any chest pains or shortness of breath.  Not responding to OTC cough and cold medications.  Normal development  Vaccinations UTD    Review of systems otherwise negative    PMH:   Patient Active Problem List   Diagnosis   • Bronchospasm   • Chronic nonallergic rhinitis   • Recurrent sinusitis   • Rectal prolapse   • Speech delay   • Adenoid hypertrophy   • ETD (Eustachian tube dysfunction), bilateral   • GERD (gastroesophageal reflux disease)   • Hearing loss   • OME (otitis media with effusion)   • Snoring   • Mild intermittent asthma   • Mild anemia     Current Outpatient Medications   Medication Sig Dispense Refill   • methylphenidate (Metadate CD) 10 MG CR capsule Take 1 capsule by mouth every morning. 30 capsule 0   • methylphenidate 5 MG chewable tablet Take 1 chew tab in the afternoon as needed 30 tablet 0   • fluticasone-salmeterol (ADVAIR HFA) 115-21 MCG/ACT inhaler Inhale 2 puffs into the lungs 2 times daily. 3 Inhaler 1   • PROAIR  (90 Base) MCG/ACT inhaler INHALE 2 PUFFS BY MOUTH EVERY 4 HOURS AS NEEDED FOR WHEEZING, SHORTNESS OF BREATH OR COUGH 1 Inhaler 0   • albuterol (VENTOLIN) (2.5 MG/3ML) 0.083% nebulizer solution Take 3 mLs by nebulization daily. 75 mL 3   • Spacer/Aero-Hold Chamber Mask Misc Please dispense the spacer and mask best covered by insurance. 1 Device 1   • ipratropium (ATROVENT) 0.06 % nasal spray Spray 1 spray in each nostril every 6 hours as needed for Rhinitis. 15 mL 0   • triamcinolone (ARISTOCORT) 0.1 % ointment Apply twice daily as needed up to 2 weeks consecutively in non-sensitive, affected skin areas. 30 g 0   • ibuprofen  (MOTRIN,ADVIL) 100 MG/5ML suspension Take 150 mg by mouth. - Oral       No current facility-administered medications for this visit.          Objective  Visit Vitals  Pulse 94   Temp 98.2 °F (36.8 °C) (Tympanic)   Resp 20   Wt 23.6 kg (52 lb)   SpO2 94%     No acute distress, nontoxic in appearance   CN 2-12 intact  TM's clear Conjunctiva-clear  Nasal mucosa- erythematous, turbinates swollen,   Oropharynx- red, MMM, airways patent, mild tonsillar swelling  Small anterior cervical lymphadenopathy, neck supple.  No meningismus.  No nasal flaring or grunting no use of accessory muscles of respiration  Lungs good air entry bilaterally  CVS- S1,S2 present RRR, no rubs gallops or murmurs    Skin - good tissue turgor and capillary refill, no rash      Assesement and plan  Viral URI- RULE OUT covid    SWAB OBTAINED    Fever control and OTC symptomatic treatment    Fever:................................ Tylenol for fever (10-15mg/kg q4-6 hours), Ibuprofen for fever (5-10mg/kg) q 6-8hrs, Call if develops stiff neck, difficulty breathing, lethargy, etc. and Call if fever lasts longer then 3 days.    Side effects of all medications were discussed.  Patient is instructed to follow up in 2-3 days or as needed.  Patient is to go to the emergency room for any significant change or worsening.  Patient was discharged in a stable condition and was in agreement with the plan.  No further questions.    Visit Vitals  Pulse 94   Temp 98.2 °F (36.8 °C) (Tympanic)   Resp 20   Wt 23.6 kg (52 lb)   SpO2 94%          PAST MEDICAL HISTORY:  BPH (benign prostatic hyperplasia)     Diverticulosis     Hemorrhoids     Hypertension

## 2021-08-21 NOTE — ED PROVIDER NOTE - NSFOLLOWUPINSTRUCTIONS_ED_ALL_ED_FT
You were seen in the ED today for Acute Urinary Retention.    Acute urinary retention is a condition in which a person is unable to pass urine. This can last for a short time or for a long time. If left untreated, it can result in kidney damage or other serious complications.     What are the causes?  This condition may be caused by:    Obstruction or narrowing of the tube that drains the bladder (urethra). This may be caused by surgery or problems with nearby organs, such as the prostate gland, which can press or squeeze the urethra.  Problems with the nerves in the bladder. These can be caused by diseases, such as multiple sclerosis, or by spinal cord injuries.  Certain medicines.  Tumors in the area of the pelvis, bladder, or urethra.  Diabetes.  Degenerative cognitive conditions such as delirium or dementia.  Bladder or urinary tract infection.  Constipation.  Blood in the urine (hematuria).  Injury to the bladder or urethra.   Psychological (psychogenic) conditions. Someone may hold his urine due to trauma or because he does not want to use the bathroom.    What increases the risk?  This condition is more likely to develop in older men. As men age, their prostate may become larger and may start pressing or squeezing on the bladder or the urethra.    What are the signs or symptoms?  Symptoms of this condition include:    Trouble urinating.  Pain in the lower abdomen.    Symptoms usually come on slowly over a long period of time.    How is this diagnosed?  This condition is diagnosed based on a physical exam and a medical history. You may also have other tests, including:    An ultrasound of the bladder or kidneys or both.  Blood tests.  A urine analysis.  Additional tests may be needed such as an MRI, kidney, or bladder function tests.    How is this treated?  Treatment for this condition may include:    Medicines.  Placing a thin, sterile tube (catheter) into the bladder to drain urine out of the body. This is called an indwelling urinary catheter. After being inserted, the catheter is held in place with a small balloon that is filled with sterile water. Urine drains from the catheter into a collection bag outside of the body.  Behavioral therapy.  Treatment for any underlying conditions.  If needed, you may be treated in the hospital for kidney function problems or to manage other complications.    Follow these instructions at home:  Take over-the-counter and prescription medicines only as told by your health care provider. Avoid certain medicines, such as decongestants, antihistamines, and some prescription medicines. Do not take any medicine unless your health care provider has approved.  If you were given an indwelling urinary catheter, take care of it as told by your health care provider.  Drink enough fluid to keep your urine clear or pale yellow.  If you were prescribed an antibiotic, take it as told by your health care provider. Do not stop taking the antibiotic even if you start to feel better.  Do not use any products that contain nicotine or tobacco, such as cigarettes and e-cigarettes. If you need help quitting, ask your health care provider.  Monitor any changes in your symptoms. Tell your health care provider about any changes.  If instructed, monitor your blood pressure at home. Report changes as told by your health care provider.  Keep all follow-up visits as told by your health care provider. This is important.    Contact a health care provider if:  You have uncomfortable bladder contractions that you cannot control (spasms) or you leak urine with the spasms.    Get help right away if:  You have chills or fever.  You have blood in your urine.  You have a catheter and:  Your catheter stops draining urine.  Your catheter falls out.    Summary  Acute urinary retention is a condition in which a person is unable to pass urine. If left untreated, it can result in kidney damage or other serious complications.  The cause of this condition may include an enlarged prostate. As men age, their prostate gland may become larger and may start pressing or squeezing on the bladder or the urethra.  Treatment for this condition may include medicines and placement of an indwelling urinary catheter.  Monitor any changes in your symptoms. Tell your health care provider about any changes.

## 2021-08-21 NOTE — ED PROVIDER NOTE - PATIENT PORTAL LINK FT
You can access the FollowMyHealth Patient Portal offered by [facility] by registering at the following website: [email or web address]. By joining [employer]’s FollowMyHealth portal, you will also be able to view your health information using other applications (apps) compatible with our system.

## 2021-08-21 NOTE — ED PROVIDER NOTE - OBJECTIVE STATEMENT
He has a history of BPH S/P NeoTract insertion in May of this year (which he states did not work).  He has had recurrent bouts of urinary retention, one of which led to an ED visit here previously, and I happened to be his provider then as well.  He states he hasn't emptied his bladder in 8-12 hours, and he complains of suprapubic fullness/pressure.  No significant abdominal pain.  No N/V.  No F/C.  No dysuria or hematuria.  BMs have been normal, but he usually suffers from chronic IBS-C and is often constipated.  He sees a urologist at a facility outside of [facility].  He feels that he needs a catheter, but he does not want to leave it in.  He has never been taught to self-cath at home on a PRN basis in the past.  He is already on Flomax, which he takes QHS, but he took an extra one around 4am last night.  No new medications.  No OTC antihistamines.

## 2021-08-21 NOTE — ED PROVIDER NOTE - GASTROINTESTINAL, MLM
Abdomen soft, minimally tender over suprapubic region with distended bladder (also seen on ultrasound), but no rebound and, no guarding.

## 2021-08-22 ENCOUNTER — EMERGENCY (EMERGENCY)
Facility: HOSPITAL | Age: 81
LOS: 1 days | Discharge: HOME | End: 2021-08-22
Attending: EMERGENCY MEDICINE | Admitting: EMERGENCY MEDICINE
Payer: MEDICARE

## 2021-08-22 VITALS
RESPIRATION RATE: 18 BRPM | HEIGHT: 71 IN | SYSTOLIC BLOOD PRESSURE: 143 MMHG | OXYGEN SATURATION: 98 % | WEIGHT: 132.94 LBS | HEART RATE: 65 BPM | DIASTOLIC BLOOD PRESSURE: 89 MMHG | TEMPERATURE: 98 F

## 2021-08-22 DIAGNOSIS — Z90.49 ACQUIRED ABSENCE OF OTHER SPECIFIED PARTS OF DIGESTIVE TRACT: ICD-10-CM

## 2021-08-22 DIAGNOSIS — Z79.83 LONG TERM (CURRENT) USE OF BISPHOSPHONATES: ICD-10-CM

## 2021-08-22 DIAGNOSIS — N40.1 BENIGN PROSTATIC HYPERPLASIA WITH LOWER URINARY TRACT SYMPTOMS: ICD-10-CM

## 2021-08-22 DIAGNOSIS — R33.9 RETENTION OF URINE, UNSPECIFIED: ICD-10-CM

## 2021-08-22 DIAGNOSIS — K52.9 NONINFECTIVE GASTROENTERITIS AND COLITIS, UNSPECIFIED: ICD-10-CM

## 2021-08-22 DIAGNOSIS — I10 ESSENTIAL (PRIMARY) HYPERTENSION: ICD-10-CM

## 2021-08-22 PROCEDURE — 99284 EMERGENCY DEPT VISIT MOD MDM: CPT

## 2021-08-22 NOTE — ED PROVIDER NOTE - CLINICAL SUMMARY MEDICAL DECISION MAKING FREE TEXT BOX
Patient with intermittent urinary retention complaining of no urination for the past 4.5 hours. Not in distress. VS normal. Bladder not distended. I have low suspicion for obstructive uropathy as a result of urinary retention and do not think labwork is necessary. Initially patient agreed, reluctantly, to have Sanchez catheter placed by RN, but then walked to the bathroom and urinated spontaneously and now feels comfortable going home. He will see his urologist tomorrow for follow-up and will return to ED if problem recurs.

## 2021-08-22 NOTE — ED PROVIDER NOTE - NSFOLLOWUPINSTRUCTIONS_ED_ALL_ED_FT
Continue taking your usual medications, drink plenty of water, and try to empty bladder regularly. If you have any problems with urination or anything else, please feel free to come back.

## 2021-08-22 NOTE — ED PROVIDER NOTE - PATIENT PORTAL LINK FT
You can access the FollowMyHealth Patient Portal offered by [facility] by registering at the following website: [email or web address]. By joining [employer]’s FollowMyHealth portal, you will also be able to view your health information using other applications (apps) compatible with our system.

## 2021-08-22 NOTE — ED ADULT NURSE NOTE - OBJECTIVE STATEMENT
c/o urinary retention since this am, seen here yesterday for the same issue, bladder distended. No s/s of distress, awaiting provider eval

## 2021-08-22 NOTE — ED PROVIDER NOTE - CARE PROVIDER_API CALL
Mann Morales  Specialist  [address] [place] [address] 6  [place], [place] [id number]  Phone: [phone]  Fax: [phone]  Follow Up Time:

## 2021-08-22 NOTE — ED PROVIDER NOTE - OBJECTIVE STATEMENT
80-year-old man with past medical history of IBS-C, diverticulosis, and BPH on flomax and finasteride who was seen in the ED yesterday complaining of urinary retention and was ultimately discharged without a catheter who now presents to the ED again complaining of inability to void urine since 4:40am today. He had voided once per hour since leaving the ED yesterday and felt fine until flow "suddenly cut off" early this morning. He is not having suprapubic discomfort or flank pain. He follows with a urologist who he plans to see tomorrow about this problem.

## 2021-08-26 ENCOUNTER — EMERGENCY (EMERGENCY)
Facility: HOSPITAL | Age: 81
LOS: 1 days | Discharge: HOME | End: 2021-08-26
Attending: EMERGENCY MEDICINE | Admitting: EMERGENCY MEDICINE
Payer: MEDICARE

## 2021-08-26 VITALS
RESPIRATION RATE: 18 BRPM | SYSTOLIC BLOOD PRESSURE: 183 MMHG | DIASTOLIC BLOOD PRESSURE: 96 MMHG | OXYGEN SATURATION: 100 % | HEART RATE: 58 BPM | TEMPERATURE: 98 F

## 2021-08-26 VITALS
DIASTOLIC BLOOD PRESSURE: 82 MMHG | SYSTOLIC BLOOD PRESSURE: 124 MMHG | RESPIRATION RATE: 18 BRPM | WEIGHT: 164.91 LBS | OXYGEN SATURATION: 98 % | HEART RATE: 71 BPM | HEIGHT: 71 IN | TEMPERATURE: 98 F

## 2021-08-26 DIAGNOSIS — N40.1 BENIGN PROSTATIC HYPERPLASIA WITH LOWER URINARY TRACT SYMPTOMS: ICD-10-CM

## 2021-08-26 DIAGNOSIS — Z90.49 ACQUIRED ABSENCE OF OTHER SPECIFIED PARTS OF DIGESTIVE TRACT: ICD-10-CM

## 2021-08-26 DIAGNOSIS — K59.00 CONSTIPATION, UNSPECIFIED: ICD-10-CM

## 2021-08-26 DIAGNOSIS — K52.9 NONINFECTIVE GASTROENTERITIS AND COLITIS, UNSPECIFIED: ICD-10-CM

## 2021-08-26 DIAGNOSIS — K57.30 DIVERTICULOSIS OF LARGE INTESTINE WITHOUT PERFORATION OR ABSCESS WITHOUT BLEEDING: ICD-10-CM

## 2021-08-26 DIAGNOSIS — I10 ESSENTIAL (PRIMARY) HYPERTENSION: ICD-10-CM

## 2021-08-26 LAB
ALBUMIN SERPL ELPH-MCNC: 3.5 G/DL — SIGNIFICANT CHANGE UP (ref 3.4–5)
ALP SERPL-CCNC: 75 U/L — SIGNIFICANT CHANGE UP (ref 40–120)
ALT FLD-CCNC: 24 U/L — SIGNIFICANT CHANGE UP (ref 12–42)
ANION GAP SERPL CALC-SCNC: 6 MMOL/L — LOW (ref 9–16)
AST SERPL-CCNC: 17 U/L — SIGNIFICANT CHANGE UP (ref 15–37)
BASOPHILS # BLD AUTO: 0.02 K/UL — SIGNIFICANT CHANGE UP (ref 0–0.2)
BASOPHILS NFR BLD AUTO: 0.4 % — SIGNIFICANT CHANGE UP (ref 0–2)
BILIRUB SERPL-MCNC: 0.9 MG/DL — SIGNIFICANT CHANGE UP (ref 0.2–1.2)
BUN SERPL-MCNC: 12 MG/DL — SIGNIFICANT CHANGE UP (ref 7–23)
CALCIUM SERPL-MCNC: 8.9 MG/DL — SIGNIFICANT CHANGE UP (ref 8.5–10.5)
CHLORIDE SERPL-SCNC: 93 MMOL/L — LOW (ref 96–108)
CO2 SERPL-SCNC: 29 MMOL/L — SIGNIFICANT CHANGE UP (ref 22–31)
CREAT SERPL-MCNC: 1.04 MG/DL — SIGNIFICANT CHANGE UP (ref 0.5–1.3)
EOSINOPHIL # BLD AUTO: 0.06 K/UL — SIGNIFICANT CHANGE UP (ref 0–0.5)
EOSINOPHIL NFR BLD AUTO: 1.2 % — SIGNIFICANT CHANGE UP (ref 0–6)
GLUCOSE SERPL-MCNC: 93 MG/DL — SIGNIFICANT CHANGE UP (ref 70–99)
HCT VFR BLD CALC: 34.4 % — LOW (ref 39–50)
HGB BLD-MCNC: 12.3 G/DL — LOW (ref 13–17)
IMM GRANULOCYTES NFR BLD AUTO: 0.6 % — SIGNIFICANT CHANGE UP (ref 0–1.5)
LYMPHOCYTES # BLD AUTO: 1.16 K/UL — SIGNIFICANT CHANGE UP (ref 1–3.3)
LYMPHOCYTES # BLD AUTO: 22.3 % — SIGNIFICANT CHANGE UP (ref 13–44)
MCHC RBC-ENTMCNC: 34 PG — SIGNIFICANT CHANGE UP (ref 27–34)
MCHC RBC-ENTMCNC: 35.8 GM/DL — SIGNIFICANT CHANGE UP (ref 32–36)
MCV RBC AUTO: 95 FL — SIGNIFICANT CHANGE UP (ref 80–100)
MONOCYTES # BLD AUTO: 0.32 K/UL — SIGNIFICANT CHANGE UP (ref 0–0.9)
MONOCYTES NFR BLD AUTO: 6.1 % — SIGNIFICANT CHANGE UP (ref 2–14)
NEUTROPHILS # BLD AUTO: 3.62 K/UL — SIGNIFICANT CHANGE UP (ref 1.8–7.4)
NEUTROPHILS NFR BLD AUTO: 69.4 % — SIGNIFICANT CHANGE UP (ref 43–77)
NRBC # BLD: 0 /100 WBCS — SIGNIFICANT CHANGE UP (ref 0–0)
PLATELET # BLD AUTO: 228 K/UL — SIGNIFICANT CHANGE UP (ref 150–400)
POTASSIUM SERPL-MCNC: 4.5 MMOL/L — SIGNIFICANT CHANGE UP (ref 3.5–5.3)
POTASSIUM SERPL-SCNC: 4.5 MMOL/L — SIGNIFICANT CHANGE UP (ref 3.5–5.3)
PROT SERPL-MCNC: 6.2 G/DL — LOW (ref 6.4–8.2)
RBC # BLD: 3.62 M/UL — LOW (ref 4.2–5.8)
RBC # FLD: 12.3 % — SIGNIFICANT CHANGE UP (ref 10.3–14.5)
SODIUM SERPL-SCNC: 128 MMOL/L — LOW (ref 132–145)
WBC # BLD: 5.21 K/UL — SIGNIFICANT CHANGE UP (ref 3.8–10.5)
WBC # FLD AUTO: 5.21 K/UL — SIGNIFICANT CHANGE UP (ref 3.8–10.5)

## 2021-08-26 PROCEDURE — 74019 RADEX ABDOMEN 2 VIEWS: CPT | Mod: 26

## 2021-08-26 PROCEDURE — 99284 EMERGENCY DEPT VISIT MOD MDM: CPT

## 2021-08-26 RX ORDER — MULTIVIT WITH MIN/MFOLATE/K2 340-15/3 G
1 POWDER (GRAM) ORAL ONCE
Refills: 0 | Status: COMPLETED | OUTPATIENT
Start: 2021-08-26 | End: 2021-08-26

## 2021-08-26 RX ORDER — POLYETHYLENE GLYCOL 3350 17 G/17G
17 POWDER, FOR SOLUTION ORAL
Qty: 476 | Refills: 0
Start: 2021-08-26 | End: 2021-09-08

## 2021-08-26 RX ADMIN — Medication 1 BOTTLE: at 10:30

## 2021-08-26 NOTE — ED PROVIDER NOTE - NSICDXPASTMEDICALHX_GEN_ALL_CORE_FT
PAST MEDICAL HISTORY:  BPH (benign prostatic hyperplasia)     Diverticulosis     Hemorrhoids     Hypertension       IBD (inflammatory bowel disease)

## 2021-08-26 NOTE — ED PROVIDER NOTE - CLINICAL SUMMARY MEDICAL DECISION MAKING FREE TEXT BOX
81 y/o M with Hx of BPH, hypertension, and IBD with constipation [numerous ED visits in the past for constipation] presenting with 3 days of constipation. Pt does not have any symptoms to suggest SBO at this time. On exam, Abd is soft, non-tender, and non-distended, Will check plain films of Abd to evaluate gas patterns. Will treat for constipation and reassess afterwards. 79 y/o M with Hx of BPH, hypertension, and IBD with constipation [numerous ED visits in the past for constipation] presenting with 3 days of constipation. Pt does not have any symptoms to suggest SBO at this time. On exam, Abd is soft, non-tender, and non-distended, Will check plain films of Abd to evaluate gas patterns. Will treat for constipation and reassess afterwards.    XR with nonobstructive bowel gas pattern.  Pt had large BM after enema and mag citrate.  Pt feels better and wants to go home.  Return precautions discussed.  Discussed high fiber diet and increased PO hydration, regular use of miralax with pt.  Stable for dc home.

## 2021-08-26 NOTE — ED PROVIDER NOTE - OBJECTIVE STATEMENT
81 y/o M with PMHx of BPH, diverticulosis, hypertension, appendicitis [s/p appendectomy] and IBD with constipation [numerous ED visits in the past], presents to the ED for 3 days of constipation. Pt reports he had 2 small hard "partial" bowel movements before his symptoms began. He states he usually has daily bowel movements. Pt began having constipation on 8/24/21. He was recommended to use Miralax by his PCP at the [facility]. However, Pt states he only used Miralax for one day and did not have any relief of symptoms. Yesterday, Pt tried using Lactulose but was still unable to have a bowel movement. He noted he was "very gassy" last night. Pt spoke to his PCP this morning and was advised to come to the ED for an enema. Pt endorses feeling weak, disoriented, and nauseous. He denies fevers, chills, or vomiting. Of note, Pt was seen at this ED a few days ago for urinary retention which he endorses has resolved [Pt did not have a catheter placed at that time].

## 2021-08-26 NOTE — ED PROVIDER NOTE - PATIENT PORTAL LINK FT
You can access the FollowMyHealth Patient Portal offered by [facility] by registering at the following website: [email or web address]. By joining [employer]’s FollowMyHealth portal, you will also be able to view your health information using other applications (apps) compatible with our system.

## 2021-08-26 NOTE — ED PROVIDER NOTE - NSFOLLOWUPINSTRUCTIONS_ED_ALL_ED_FT
Constipation    WHAT YOU NEED TO KNOW:    Constipation means you have hard, dry bowel movements, or you go longer than usual between bowel movements.    DISCHARGE INSTRUCTIONS:    Call your doctor if:   •You have blood in your bowel movements.      •You have a fever and abdominal pain with the constipation.      •Your constipation gets worse.      •You start to vomit.      •You have questions or concerns about your condition or care.      Medicines:   •Medicine such as a laxative may help relax and loosen your intestines to help you have a bowel movement. Your provider may recommend you only use laxatives for a short time. Long-term use may make your bowels dependent on the medicine.      •Take your medicine as directed. Contact your healthcare provider if you think your medicine is not helping or if you have side effects. Tell him of her if you are allergic to any medicine. Keep a list of the medicines, vitamins, and herbs you take. Include the amounts, and when and why you take them. Bring the list or the pill bottles to follow-up visits. Carry your medicine list with you in case of an emergency.      Relieve constipation:   •A suppository may be used to help soften your bowel movements. This may make them easier to pass. A suppository is guided into your rectum through your anus.  Suppository for Constipation           •An enema is liquid medicine used to clear bowel movement from your rectum. The medicine is put into your rectum through your anus.  Enemas           Prevent constipation:   •Drink liquids as directed. You may need to drink extra liquids to help soften and move your bowels. Ask how much liquid to drink each day and which liquids are best for you.      •Eat high-fiber foods. This may help decrease constipation by adding bulk to your bowel movements. High-fiber foods include fruit, vegetables, whole-grain breads and cereals, and beans. Your healthcare provider or dietitian can help you create a high-fiber meal plan. Your provider may also recommend a fiber supplement if you cannot get enough fiber from food.             •Exercise regularly. Regular physical activity can help stimulate your intestines. Walking is a good exercise to prevent or relieve constipation. Ask which exercises are best for you.  [employer] Walking for Exercise           •Schedule a time each day to have a bowel movement. This may help train your body to have regular bowel movements. Bend forward while you are on the toilet to help move the bowel movement out. Sit on the toilet for at least 10 minutes, even if you do not have a bowel movement.      •Talk to your healthcare provider about your medicines. Certain medicines, such as opioids, can cause constipation. Your provider may be able to make medicine changes. For example, he or she may change the kind of medicine, or change when you take it.      Follow up with your doctor as directed: Write down your questions so you remember to ask them during your visits.

## 2021-08-28 DIAGNOSIS — Z90.49 ACQUIRED ABSENCE OF OTHER SPECIFIED PARTS OF DIGESTIVE TRACT: Chronic | ICD-10-CM

## 2021-11-22 ENCOUNTER — EMERGENCY (EMERGENCY)
Facility: HOSPITAL | Age: 81
LOS: 1 days | Discharge: HOME | End: 2021-11-22
Admitting: EMERGENCY MEDICINE
Payer: MEDICARE

## 2021-11-22 VITALS
RESPIRATION RATE: 18 BRPM | HEART RATE: 55 BPM | DIASTOLIC BLOOD PRESSURE: 80 MMHG | SYSTOLIC BLOOD PRESSURE: 169 MMHG | OXYGEN SATURATION: 99 % | TEMPERATURE: 98 F

## 2021-11-22 VITALS
HEART RATE: 57 BPM | HEIGHT: 71 IN | RESPIRATION RATE: 16 BRPM | SYSTOLIC BLOOD PRESSURE: 155 MMHG | WEIGHT: 132.94 LBS | DIASTOLIC BLOOD PRESSURE: 91 MMHG | OXYGEN SATURATION: 98 % | TEMPERATURE: 98 F

## 2021-11-22 DIAGNOSIS — Z90.49 ACQUIRED ABSENCE OF OTHER SPECIFIED PARTS OF DIGESTIVE TRACT: ICD-10-CM

## 2021-11-22 DIAGNOSIS — K58.9 IRRITABLE BOWEL SYNDROME WITHOUT DIARRHEA: ICD-10-CM

## 2021-11-22 DIAGNOSIS — R19.7 DIARRHEA, UNSPECIFIED: ICD-10-CM

## 2021-11-22 DIAGNOSIS — I10 ESSENTIAL (PRIMARY) HYPERTENSION: ICD-10-CM

## 2021-11-22 DIAGNOSIS — Z87.19 PERSONAL HISTORY OF OTHER DISEASES OF THE DIGESTIVE SYSTEM: ICD-10-CM

## 2021-11-22 DIAGNOSIS — Z90.49 ACQUIRED ABSENCE OF OTHER SPECIFIED PARTS OF DIGESTIVE TRACT: Chronic | ICD-10-CM

## 2021-11-22 DIAGNOSIS — N40.1 BENIGN PROSTATIC HYPERPLASIA WITH LOWER URINARY TRACT SYMPTOMS: ICD-10-CM

## 2021-11-22 PROBLEM — K52.9 NONINFECTIVE GASTROENTERITIS AND COLITIS, UNSPECIFIED: Chronic | Status: ACTIVE | Noted: 2021-08-28

## 2021-11-22 LAB
ALBUMIN SERPL ELPH-MCNC: 3.7 G/DL — SIGNIFICANT CHANGE UP (ref 3.4–5)
ALP SERPL-CCNC: 76 U/L — SIGNIFICANT CHANGE UP (ref 40–120)
ALT FLD-CCNC: 11 U/L — LOW (ref 12–42)
ANION GAP SERPL CALC-SCNC: 6 MMOL/L — LOW (ref 9–16)
APPEARANCE UR: CLEAR — SIGNIFICANT CHANGE UP
AST SERPL-CCNC: 14 U/L — LOW (ref 15–37)
BASOPHILS # BLD AUTO: 0.04 K/UL — SIGNIFICANT CHANGE UP (ref 0–0.2)
BASOPHILS NFR BLD AUTO: 0.7 % — SIGNIFICANT CHANGE UP (ref 0–2)
BILIRUB SERPL-MCNC: 0.7 MG/DL — SIGNIFICANT CHANGE UP (ref 0.2–1.2)
BILIRUB UR-MCNC: NEGATIVE — SIGNIFICANT CHANGE UP
BUN SERPL-MCNC: 13 MG/DL — SIGNIFICANT CHANGE UP (ref 7–23)
CALCIUM SERPL-MCNC: 9.2 MG/DL — SIGNIFICANT CHANGE UP (ref 8.5–10.5)
CHLORIDE SERPL-SCNC: 101 MMOL/L — SIGNIFICANT CHANGE UP (ref 96–108)
CO2 SERPL-SCNC: 29 MMOL/L — SIGNIFICANT CHANGE UP (ref 22–31)
COLOR SPEC: YELLOW — SIGNIFICANT CHANGE UP
CREAT SERPL-MCNC: 1.26 MG/DL — SIGNIFICANT CHANGE UP (ref 0.5–1.3)
DIFF PNL FLD: NEGATIVE — SIGNIFICANT CHANGE UP
EOSINOPHIL # BLD AUTO: 0.12 K/UL — SIGNIFICANT CHANGE UP (ref 0–0.5)
EOSINOPHIL NFR BLD AUTO: 2.1 % — SIGNIFICANT CHANGE UP (ref 0–6)
GLUCOSE SERPL-MCNC: 98 MG/DL — SIGNIFICANT CHANGE UP (ref 70–99)
GLUCOSE UR QL: NEGATIVE — SIGNIFICANT CHANGE UP
HCT VFR BLD CALC: 39.7 % — SIGNIFICANT CHANGE UP (ref 39–50)
HGB BLD-MCNC: 13.6 G/DL — SIGNIFICANT CHANGE UP (ref 13–17)
IMM GRANULOCYTES NFR BLD AUTO: 0.5 % — SIGNIFICANT CHANGE UP (ref 0–1.5)
KETONES UR-MCNC: NEGATIVE — SIGNIFICANT CHANGE UP
LEUKOCYTE ESTERASE UR-ACNC: NEGATIVE — SIGNIFICANT CHANGE UP
LIDOCAIN IGE QN: 47 U/L — LOW (ref 73–393)
LYMPHOCYTES # BLD AUTO: 1.41 K/UL — SIGNIFICANT CHANGE UP (ref 1–3.3)
LYMPHOCYTES # BLD AUTO: 25.2 % — SIGNIFICANT CHANGE UP (ref 13–44)
MCHC RBC-ENTMCNC: 34 PG — SIGNIFICANT CHANGE UP (ref 27–34)
MCHC RBC-ENTMCNC: 34.3 GM/DL — SIGNIFICANT CHANGE UP (ref 32–36)
MCV RBC AUTO: 99.3 FL — SIGNIFICANT CHANGE UP (ref 80–100)
MONOCYTES # BLD AUTO: 0.33 K/UL — SIGNIFICANT CHANGE UP (ref 0–0.9)
MONOCYTES NFR BLD AUTO: 5.9 % — SIGNIFICANT CHANGE UP (ref 2–14)
NEUTROPHILS # BLD AUTO: 3.67 K/UL — SIGNIFICANT CHANGE UP (ref 1.8–7.4)
NEUTROPHILS NFR BLD AUTO: 65.6 % — SIGNIFICANT CHANGE UP (ref 43–77)
NITRITE UR-MCNC: NEGATIVE — SIGNIFICANT CHANGE UP
NRBC # BLD: 0 /100 WBCS — SIGNIFICANT CHANGE UP (ref 0–0)
PH UR: 8 — SIGNIFICANT CHANGE UP (ref 5–8)
PLATELET # BLD AUTO: 196 K/UL — SIGNIFICANT CHANGE UP (ref 150–400)
POTASSIUM SERPL-MCNC: 4.4 MMOL/L — SIGNIFICANT CHANGE UP (ref 3.5–5.3)
POTASSIUM SERPL-SCNC: 4.4 MMOL/L — SIGNIFICANT CHANGE UP (ref 3.5–5.3)
PROT SERPL-MCNC: 6.5 G/DL — SIGNIFICANT CHANGE UP (ref 6.4–8.2)
PROT UR-MCNC: NEGATIVE MG/DL — SIGNIFICANT CHANGE UP
RBC # BLD: 4 M/UL — LOW (ref 4.2–5.8)
RBC # FLD: 11.9 % — SIGNIFICANT CHANGE UP (ref 10.3–14.5)
SODIUM SERPL-SCNC: 136 MMOL/L — SIGNIFICANT CHANGE UP (ref 132–145)
SP GR SPEC: 1.02 — SIGNIFICANT CHANGE UP (ref 1–1.03)
UROBILINOGEN FLD QL: 0.2 E.U./DL — SIGNIFICANT CHANGE UP
WBC # BLD: 5.6 K/UL — SIGNIFICANT CHANGE UP (ref 3.8–10.5)
WBC # FLD AUTO: 5.6 K/UL — SIGNIFICANT CHANGE UP (ref 3.8–10.5)

## 2021-11-22 PROCEDURE — 74177 CT ABD & PELVIS W/CONTRAST: CPT | Mod: 26

## 2021-11-22 PROCEDURE — 99284 EMERGENCY DEPT VISIT MOD MDM: CPT

## 2021-11-22 RX ORDER — IOHEXOL 300 MG/ML
30 INJECTION, SOLUTION INTRAVENOUS ONCE
Refills: 0 | Status: COMPLETED | OUTPATIENT
Start: 2021-11-22 | End: 2021-11-22

## 2021-11-22 RX ORDER — SODIUM CHLORIDE 9 MG/ML
1850 INJECTION, SOLUTION INTRAVENOUS ONCE
Refills: 0 | Status: COMPLETED | OUTPATIENT
Start: 2021-11-22 | End: 2021-11-22

## 2021-11-22 RX ADMIN — IOHEXOL 30 MILLILITER(S): 300 INJECTION, SOLUTION INTRAVENOUS at 10:41

## 2021-11-22 RX ADMIN — SODIUM CHLORIDE 1850 MILLILITER(S): 9 INJECTION, SOLUTION INTRAVENOUS at 10:41

## 2021-11-22 NOTE — ED ADULT NURSE NOTE - OBJECTIVE STATEMENT
82 y/o male who presents to ED for diarrhea x 3 days ( reports 1 explosive episode of diarrhea ) is here requesting abx- states he has an appointment with his doctor last this week

## 2021-11-22 NOTE — ED PROVIDER NOTE - OBJECTIVE STATEMENT
80 yo M w/ PMHx of BPH, diverticulosis, hemorrhoids, IBS, HTN, presents to the ED c/o "explosive diarrhea" once a day x 3 days. Pt reports known Hx of IBS that presents always as constipation, not diarrhea. Pt was concerned for dehydration and came to the ED for further evaluation. Pt also c/o mild lower abdominal discomfort, nausea, no vomiting, no blood in stool, no urinary symptoms, no new medications or changes in diet.

## 2021-11-22 NOTE — ED PROVIDER NOTE - PATIENT PORTAL LINK FT
You can access the FollowMyHealth Patient Portal offered by [facility] by registering at the following website: [email or web address]. By joining [employer]’s FollowMyHealth portal, you will also be able to view your health information using other applications (apps) compatible with our system.

## 2021-11-22 NOTE — ED PROVIDER NOTE - CLINICAL SUMMARY MEDICAL DECISION MAKING FREE TEXT BOX
Pt with Hx of IBS presents to the ED with new onset diarrhea x 3 days. will check labs, CTAP to rule out infectious process. Pt with Hx of IBS presents to the ED with new onset diarrhea x 3 days. will check labs, CTAP to rule out infectious process.  labs, ct, urine - wnl  unable to obtain stool culture, no diarrhea in ED.  Diarrhea is likely from IBS, has appointment with PCP & GI in next couple weeks. Patient educated to stay hydrated.

## 2021-11-22 NOTE — ED PROVIDER NOTE - NSFOLLOWUPINSTRUCTIONS_ED_ALL_ED_FT
1. Return to the ED if worsening symptoms.    Diarrhea    Diarrhea is frequent loose or watery bowel movements that has many causes. Diarrhea can make you feel weak and cause you to become dehydrated. Diarrhea typically lasts 2–3 days, but can last longer if it is a sign of something more serious. Drink clear fluids to prevent dehydration. Eat bland, easy-to-digest foods as tolerated.     SEEK IMMEDIATE MEDICAL CARE IF YOU HAVE ANY OF THE FOLLOWING SYMPTOMS: high fevers, lightheadedness/dizziness, chest pain, black or bloody stools, shortness of breath, severe abdominal or back pain, or any signs of dehydration.

## 2022-01-03 ENCOUNTER — EMERGENCY (EMERGENCY)
Facility: HOSPITAL | Age: 82
LOS: 1 days | Discharge: LEFT BEFORE DISCHARGE | End: 2022-01-03
Admitting: EMERGENCY MEDICINE
Payer: MEDICARE

## 2022-01-03 VITALS
DIASTOLIC BLOOD PRESSURE: 93 MMHG | SYSTOLIC BLOOD PRESSURE: 177 MMHG | WEIGHT: 136.03 LBS | HEIGHT: 71 IN | HEART RATE: 61 BPM | RESPIRATION RATE: 18 BRPM | TEMPERATURE: 98 F | OXYGEN SATURATION: 98 %

## 2022-01-03 DIAGNOSIS — R10.9 UNSPECIFIED ABDOMINAL PAIN: ICD-10-CM

## 2022-01-03 DIAGNOSIS — Z53.21 PROCEDURE AND TREATMENT NOT CARRIED OUT DUE TO PATIENT LEAVING PRIOR TO BEING SEEN BY HEALTH CARE PROVIDER: ICD-10-CM

## 2022-01-03 DIAGNOSIS — Z90.49 ACQUIRED ABSENCE OF OTHER SPECIFIED PARTS OF DIGESTIVE TRACT: Chronic | ICD-10-CM

## 2022-01-03 PROCEDURE — L9991: CPT

## 2022-01-03 NOTE — ED ADULT TRIAGE NOTE - CHIEF COMPLAINT QUOTE
Pt complaining of abd pain, nausea, constipation and trouble urinating. Pt with pmh of ibs and uro lyfit. Pt states that he is very anxious at this time.

## 2022-05-17 NOTE — ED ADULT NURSE NOTE - NSICDXNOPASTSURGICALHX_GEN_ALL_CORE
Continue Regimen: TRIAMCINOLONE
Detail Level: Zone
<-- Click to add NO significant Past Surgical History

## 2022-07-17 ENCOUNTER — EMERGENCY (EMERGENCY)
Facility: HOSPITAL | Age: 82
LOS: 1 days | Discharge: LEFT BEFORE DISCHARGE | End: 2022-07-17
Admitting: EMERGENCY MEDICINE

## 2022-07-17 VITALS
SYSTOLIC BLOOD PRESSURE: 145 MMHG | OXYGEN SATURATION: 99 % | HEART RATE: 59 BPM | RESPIRATION RATE: 16 BRPM | DIASTOLIC BLOOD PRESSURE: 75 MMHG | HEIGHT: 71 IN | TEMPERATURE: 98 F | WEIGHT: 138.89 LBS

## 2022-07-17 DIAGNOSIS — Z90.49 ACQUIRED ABSENCE OF OTHER SPECIFIED PARTS OF DIGESTIVE TRACT: Chronic | ICD-10-CM

## 2022-07-17 PROCEDURE — L9991: CPT

## 2022-07-18 DIAGNOSIS — K08.89 OTHER SPECIFIED DISORDERS OF TEETH AND SUPPORTING STRUCTURES: ICD-10-CM

## 2022-07-18 DIAGNOSIS — Z53.29 PROCEDURE AND TREATMENT NOT CARRIED OUT BECAUSE OF PATIENT'S DECISION FOR OTHER REASONS: ICD-10-CM

## 2022-07-21 ENCOUNTER — EMERGENCY (EMERGENCY)
Facility: HOSPITAL | Age: 82
LOS: 1 days | Discharge: HOME | End: 2022-07-21
Attending: EMERGENCY MEDICINE | Admitting: EMERGENCY MEDICINE

## 2022-07-21 VITALS
RESPIRATION RATE: 15 BRPM | HEART RATE: 72 BPM | DIASTOLIC BLOOD PRESSURE: 101 MMHG | SYSTOLIC BLOOD PRESSURE: 191 MMHG | WEIGHT: 165.35 LBS | HEIGHT: 72 IN | TEMPERATURE: 99 F | OXYGEN SATURATION: 97 %

## 2022-07-21 DIAGNOSIS — Z90.49 ACQUIRED ABSENCE OF OTHER SPECIFIED PARTS OF DIGESTIVE TRACT: Chronic | ICD-10-CM

## 2022-07-21 PROCEDURE — 99283 EMERGENCY DEPT VISIT LOW MDM: CPT

## 2022-07-21 RX ORDER — FLUORESCEIN SODIUM 9 MG
1 STRIP OPHTHALMIC (EYE) ONCE
Refills: 0 | Status: COMPLETED | OUTPATIENT
Start: 2022-07-21 | End: 2022-07-21

## 2022-07-21 RX ADMIN — Medication 1 APPLICATION(S): at 17:57

## 2022-07-21 RX ADMIN — Medication 1 DROP(S): at 17:57

## 2022-07-21 NOTE — ED ADULT NURSE NOTE - PAIN RATING/NUMBER SCALE (0-10): REST
[facility] Pharmacy Note:  Pain Consult    Ela Escalona is a 75year old female started on Dilaudid PCA by Dr. Corie Munson. Pharmacy was consulted to review medication profile and to discontinue previously ordered narcotics and sedatives.     Medication profile w 0

## 2022-07-21 NOTE — ED PROVIDER NOTE - TOBACCO USE
Refill request    Medication: traMADol (ULTRAM) 50 MG tablet      Last clinic appointment: 11/5/21  Next clinic appointment: 2/14/22    Last Drug Screen Collected: 9/20/21  Controlled Substance Agreement signed: 11/6/21      Preferred pharmacy:    [facility]-  [address] [place], [place]          
Signed Prescriptions:                        Disp   Refills    traMADol (ULTRAM) 50 MG tablet             60 tab*0        Sig: Take 1 tablet (50 mg) by mouth every 6 hours as           needed for severe pain Max of 2 tabs/day.  Authorizing Provider: LAURA SANTIZO      Reviewed [place] Prescription Monitoring Program, appears appropriate.     VAMSI Zuluaga [facility]     
Unknown if ever smoked

## 2022-07-21 NOTE — ED PROVIDER NOTE - NSFOLLOWUPINSTRUCTIONS_ED_ALL_ED_FT
Subconjunctival Hemorrhage    Please follow up with an eye MD within a day or two.     [facility] [address], they have a walk in eye ER during business hours on weekdays.     WHAT YOU NEED TO KNOW:    What is a subconjunctival hemorrhage? A subconjunctival hemorrhage is when blood collects under the conjunctiva in your eye. The conjunctiva is the clear lining that covers the white part of your eye. The blood comes from broken blood vessels under the conjunctiva.    What causes a subconjunctival hemorrhage? The exact cause of your subconjunctival hemorrhage may not be known. The following are common causes:   •An accident or injury to the eye      •Hard coughs or sneezes      •Medical conditions, such as high blood pressure, diabetes, or a bleeding disorder      •Strain, such as when you lift a heavy object or have a bowel movement      •Blood thinning medicines      •Vomiting      What are the signs and symptoms of a subconjunctival hemorrhage? The most common sign is a red patch on the white part of your eye. The redness may be present for 2 to 3 weeks. You may have mild pain when you move your eye.    How is a subconjunctival hemorrhage diagnosed? Your healthcare provider will examine your eye and check your vision. You may need tests to check for an underlying medical condition.    How is a subconjunctival hemorrhage treated? A subconjunctival hemorrhage usually goes away on its own. You may need any of the following to help manage your symptoms:   •Cold or warm compress: Use a cold pack during the first 24 hours. Ask how often to apply it and for how long each time. After the first 24 hours, apply a warm pack on your eye. Do this 3 times each day for about 10 to 15 minutes each time.      •Eyedrops: You may need artificial tears to keep your eye moist. Use the drops as directed.  Steps 1 2 3 4           When should I contact my healthcare provider?   •The redness in your eye has not gone away after 3 weeks.      •You have another subconjunctival hemorrhage.      •You have subconjunctival hemorrhages in both eyes.      •You have questions or concerns about your condition or care.      When should I seek immediate care?   •You have eye pain or sensitivity to light.      •Your vision changes.      •You have white or yellow discharge from your eye.      CARE AGREEMENT:    You have the right to help plan your care. Learn about your health condition and how it may be treated. Discuss treatment options with your healthcare providers to decide what care you want to receive. You always have the right to refuse treatment.

## 2022-07-21 NOTE — ED PROVIDER NOTE - CARE PROVIDER_API CALL
Ammon Benito  OPHTHALMOLOGY  [address] [address]  Phone: [phone]  Fax: [phone]  Follow Up Time:

## 2022-07-21 NOTE — ED ADULT TRIAGE NOTE - CHIEF COMPLAINT QUOTE
patient walk in with bloody left eye; noticed around 4pm by patient; denies any trauma to eye; blurriness to left eye as well and pain with blinking

## 2022-07-21 NOTE — ED PROVIDER NOTE - PATIENT PORTAL LINK FT
You can access the FollowMyHealth Patient Portal offered by  by registering at the following website: [email or web address]. By joining [employer]’s FollowMyHealth portal, you will also be able to view your health information using other applications (apps) compatible with our system.

## 2022-07-21 NOTE — ED PROVIDER NOTE - OBJECTIVE STATEMENT
81 M presenting with spontaneous Left eye redness- it is bulging and bloody. he says it happened after straining today (he has BPH and IBS. He is no on AC or ASA. It is a bit irritated abd he says th vision is a bit blurry. No eye trauma.

## 2022-07-21 NOTE — ED ADULT NURSE NOTE - OBJECTIVE STATEMENT
pt. presents to the ED with redness to the left eye accompanied by blurry vision. Pt. reports he think he may have caused this due to straining. MD preformed vision test at bedside with no impairment noted to the left eye.

## 2022-07-23 ENCOUNTER — EMERGENCY (EMERGENCY)
Facility: HOSPITAL | Age: 82
LOS: 1 days | Discharge: HOME | End: 2022-07-23
Attending: EMERGENCY MEDICINE | Admitting: EMERGENCY MEDICINE

## 2022-07-23 ENCOUNTER — EMERGENCY (EMERGENCY)
Facility: HOSPITAL | Age: 82
LOS: 1 days | Discharge: HOME | End: 2022-07-23
Admitting: EMERGENCY MEDICINE

## 2022-07-23 VITALS
RESPIRATION RATE: 18 BRPM | HEART RATE: 99 BPM | TEMPERATURE: 98 F | SYSTOLIC BLOOD PRESSURE: 158 MMHG | DIASTOLIC BLOOD PRESSURE: 89 MMHG | OXYGEN SATURATION: 98 % | HEIGHT: 72 IN

## 2022-07-23 VITALS
OXYGEN SATURATION: 99 % | TEMPERATURE: 98 F | HEART RATE: 62 BPM | SYSTOLIC BLOOD PRESSURE: 166 MMHG | WEIGHT: 136.03 LBS | HEIGHT: 72 IN | DIASTOLIC BLOOD PRESSURE: 85 MMHG | RESPIRATION RATE: 16 BRPM

## 2022-07-23 DIAGNOSIS — Z90.49 ACQUIRED ABSENCE OF OTHER SPECIFIED PARTS OF DIGESTIVE TRACT: Chronic | ICD-10-CM

## 2022-07-23 PROCEDURE — 99284 EMERGENCY DEPT VISIT MOD MDM: CPT

## 2022-07-23 RX ORDER — SENNA PLUS 8.6 MG/1
2 TABLET ORAL
Qty: 28 | Refills: 0
Start: 2022-07-23 | End: 2022-08-05

## 2022-07-23 RX ORDER — DOCUSATE SODIUM 100 MG
1 CAPSULE ORAL
Qty: 28 | Refills: 0
Start: 2022-07-23 | End: 2022-08-05

## 2022-07-23 RX ORDER — MULTIVIT WITH MIN/MFOLATE/K2 340-15/3 G
1 POWDER (GRAM) ORAL ONCE
Refills: 0 | Status: DISCONTINUED | OUTPATIENT
Start: 2022-07-23 | End: 2022-07-27

## 2022-07-23 RX ORDER — METOCLOPRAMIDE HCL 10 MG
10 TABLET ORAL ONCE
Refills: 0 | Status: COMPLETED | OUTPATIENT
Start: 2022-07-23 | End: 2022-07-23

## 2022-07-23 RX ORDER — METOCLOPRAMIDE HCL 10 MG
1 TABLET ORAL
Qty: 12 | Refills: 0
Start: 2022-07-23 | End: 2022-07-25

## 2022-07-23 RX ADMIN — Medication 10 MILLIGRAM(S): at 11:22

## 2022-07-23 NOTE — ED PROVIDER NOTE - OBJECTIVE STATEMENT
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 Pt says he was at his gastroenterologist appointment yesterday, left and went downtown on the bus, and then cannot remember what happened. When he got home and he noticed his eye bruising and went to the  who recommended him to this ER. He was seen and was diagnosed with a subconjunctival hematoma and given eye drops. He then saw the eye specialist  with [facility] who made the same diagnosis as the ED. He was another appointment on Mon, but says it is constantly tearing up which is why he came to the ED today. Denies fever/chills.    Also complains of mild nausea associated with hx of constipation, and is taking Zofran with no relief but tolerating po. Will provide Reglan for symptomatic relief. Also endorses right eye swelling but admits to having seasonal allergies. 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 Pt says he was at his gastroenterologist appointment yesterday, and after the bus ride home, he noticed his eye bruising and went to the  who recommended him to this ER. He was seen and was diagnosed with a subconjunctival hematoma and given eye drops for possible abrasion. He then saw the eye specialist Dr. Paiz with [facility] who made the same diagnosis as the ED. He was another appointment on Mon, but says it is constantly tearing and some of the initial bruising is spreading. Denies fever/chills, worsening vision, increased pain, trauma.     Also complains of mild nausea associated with hx of chronic constipation, and is taking Zofran with no relief but tolerating po. Will provide Reglan for symptomatic relief. Also endorses right eye swelling but admits to having seasonal allergies.

## 2022-07-23 NOTE — ED PROVIDER NOTE - NSCAREINITIATED _GEN_ER
Form fax to [phone] and confirmation received
[id number]  FORM RECEIVED VIA FAX AND PLACED IN PCP FOLDER TO BE DELIVERED AT ASSIGNED TIMES 
Mann Lott(Attending)

## 2022-07-23 NOTE — ED PROVIDER NOTE - CLINICAL SUMMARY MEDICAL DECISION MAKING FREE TEXT BOX
82 y/o M with Past Medical History of BPH and IBSC complaining abd pain from constipation and nausea but no vomiting. Was seen here twice in last 48 hours for eye pain. last saw his GI MD two days ago. Still tolerating PO and is passing flatus. likely no SBO. soft nontender abd. Will provide mag citrate and likely DC to f/u with GI MD again.

## 2022-07-23 NOTE — ED ADULT NURSE NOTE - OBJECTIVE STATEMENT
Pt presents to ED complaining of nausea sp being discharged from [facility] 3 hours ago. Ambulate Pt presents to ED complaining of nausea sp being discharged from [facility] 3 hours ago. Ambulating with steady gait appears to be in NAD

## 2022-07-23 NOTE — ED PROVIDER NOTE - CLINICAL SUMMARY MEDICAL DECISION MAKING FREE TEXT BOX
pt presents with left eye redness. diagnosed with corneal abrasion and subconjunctival hemorrhage yesterday. today has some migration of the initial upper eyelid bruising to the lower eyelid. explained this is normal progression of bruising 2/2 fascial plane. pt understands and has f/u with ophtho on Monday, Dr. Paiz. also notes chronic constipation with nausea, tolerating PO but no relief with zofran. will give reglan.

## 2022-07-23 NOTE — ED ADULT NURSE NOTE - OBJECTIVE STATEMENT
As per triage note "pt returns stating " iwas here yesterday my eye is no better", saw eye dr yesterday also , denies trauma, no blood thinners, talking antibiotics drops, denies pain left eye"

## 2022-07-23 NOTE — ED PROVIDER NOTE - CARE PLAN
1 Principal Discharge DX:	Subconjunctival hemorrhage, left  Secondary Diagnosis:	Constipation, chronic

## 2022-07-23 NOTE — ED ADULT NURSE NOTE - NS ED NOTE ABUSE SUSPICION NEGLECT YN
PATRICIA ambulatory encounter  GI OFFICE VISIT    SUBJECTIVE:  Jaquelin Brown is a 57 year old female who presented requesting evaluation for heartburn and abdominal discomfort.  Patient had last paracentesis on 2/14/18.  -6.5 L drained. Admits to feeling tight with bloating of abdomen again. Currently on spironolactone 100 asaf gram p.o. daily and Lasix 20 mg p.o. daily. Also on pantoprazole. Patient complains of nausea and states that she discontinued Zofran ODT because she was concerned about affect on muscles.  Also complains of some midsternal discomfort with heartburn and mild difficulty swallowing. 3 episodes of emesis in last 1 week without any blood. Patient also admits to constipation         Review of systems:    All other systems are reviewed and are negative except as documented in the history of present illness.     OBJECTIVE:  PAST HISTORIES:     ALLERGIES:   Allergen Reactions   • Lyrica Other (See Comments)     Violent dreams        Current Medications    ATENOLOL (TENORMIN) 25 MG TABLET    Take 25 mg by mouth nightly.    CALCIUM 500 MG CHEW    Take 1 tablet by mouth daily.      CHOLECALCIFEROL (VITAMIN D3) 5000 UNITS TABS    Take 5,000 Int'l Units by mouth daily.     MG CAPSULE    TAKE ONE CAPSULE BY MOUTH TWICE DAILY    ERIBULIN MESYLATE (HALAVEN IV)    Inject into the vein once a week.    EVEROLIMUS (AFINITOR) 10 MG TAB    Take 1 tablet by mouth daily.    EXEMESTANE (AROMASIN) 25 MG TABLET    Take 1 tablet by mouth daily.    FUROSEMIDE (LASIX) 20 MG TABLET    TAKE 1 TABLET BY MOUTH DAILY    GABAPENTIN (NEURONTIN) 600 MG TABLET    Take 600 mg by mouth 3 times daily.    HYDROCODONE-ACETAMINOPHEN (NORCO)  MG PER TABLET        HYDROMORPHONE (DILAUDID) 2 MG TABLET    Take 1-2 tablets by mouth every 4 hours as needed for Pain.    LIDOCAINE (XYLOCAINE) 5 % OINTMENT    APPLY A PEA SIZED AMOUNT 1 HOUR PRIOR TO NEEDLE INSERTION AS DIRECTED    LORAZEPAM (ATIVAN) 0.5 MG TABLET    0.5 mg as  needed. Indications: Feeling Anxious     LUBIPROSTONE (AMITIZA) 24 MCG CAPSULE    Take 1 capsule by mouth 2 times daily (with meals).    METOCLOPRAMIDE (REGLAN) 10 MG TABLET    Take 1 tablet by mouth 4 times daily.    METOPROLOL SUCCINATE (TOPROL-XL) 25 MG 24 HR TABLET        MORPHINE SR (MS CONTIN) 15 MG 12 HR TABLET    Take 1 tablet by mouth 2 times daily.    MULTIPLE VITAMINS-MINERALS (CENTRUM ULTRA WOMENS) TABS    Take 1 tablet by mouth daily.    ONDANSETRON (ZOFRAN ODT) 8 MG DISINTEGRATING TABLET    Place 1 tablet onto the tongue every 8 hours as needed for Nausea.    PANTOPRAZOLE (PROTONIX) 40 MG TABLET    Take 1 tablet by mouth daily.    PROCHLORPERAZINE (COMPAZINE) 10 MG TABLET    Take 1 tablet by mouth every 8 hours as needed for Nausea.    PROCHLORPERAZINE (COMPAZINE) 10 MG TABLET    Take 1 tablet by mouth every 6 hours as needed for Nausea or Vomiting.    ROSUVASTATIN (CRESTOR) 20 MG TABLET    Take 20 mg by mouth nightly.    SERTRALINE (ZOLOFT) 100 MG TABLET        SERTRALINE (ZOLOFT) 25 MG TABLET    Take 100 mg by mouth daily. Pt taking 150 mg  daily    SPIRONOLACTONE (ALDACTONE) 50 MG TABLET    TAKE 2 TABLETS BY MOUTH DAILY    TRAMADOL (ULTRAM) 50 MG TABLET    Take 1-2 tablets by mouth every 6 hours as needed for pain.  Maximum of 6 per day.    ZOLPIDEM (AMBIEN) 5 MG TABLET    Take 5 mg by mouth nightly as needed. Indications: Trouble Sleeping, sleep        Patient Active Problem List   Diagnosis   • Mitral valve prolapse   • Carcinoma of breast (CMS/HCC)   • Thoracic or lumbosacral neuritis or radiculitis, unspecified   • Unspecified hereditary and idiopathic peripheral neuropathy   • Anemia   • Iron deficiency anemia due to chronic blood loss   • Diastasis recti   • Constipation   • Right lower quadrant abdominal pain   • Metastatic breast cancer (CMS/HCC)   • Occluded PICC line, initial encounter (CMS/HCC)        Physical Exam:    Vital Signs: Blood pressure 100/60, height 5' (1.524 m), weight 63.5  kg.  General: The patient is well developed, well nourished, in no acute distress, appears stated age.   Neurologic: Alert. Normal mood and affect, normal speech, no gross tremor  Skin: Warm and dry, normal turgor  HEENT: Oropharynx clear, moist mucous membranes, external nose is normal to inspection  Respiratory: Respiratory effort normal.   Cardiovascular: Regular rate and rhythm  Extremities: No swelling or tenderness.  Gastrointestinal:  Mildly tense and distended with mild diffuse tenderness without guarding or rigidity  Normal bowel sounds.  No hepatomegaly or splenomegaly.      LAB RESULTS:     Lab Results   Component Value Date    SODIUM 127 (L) 02/20/2018    POTASSIUM 4.8 02/20/2018    CHLORIDE 93 (L) 02/20/2018    CO2 22 02/20/2018    BUN 41 (H) 02/20/2018    CREATININE 0.96 (H) 02/20/2018    GLUCOSE 133 (H) 02/20/2018     Lab Results   Component Value Date    WBC 10.3 02/20/2018    HCT 34.0 (L) 02/20/2018    HGB 10.6 (L) 02/20/2018     (H) 02/20/2018     Lab Results   Component Value Date    AST 48 (H) 02/20/2018    GPT 24 02/20/2018    ALKPT 163 (H) 02/20/2018    BILIRUBIN 0.3 02/20/2018       ASSESSMENT:  57-year-old female with history of metastatic breast cancer and metastatic ascites being managed with diuretics and p.r.n. paracentesis. Patient advised to limit salt intake and will continue with current dose of diuretics and p.r.n. Paracentesis..  Patient also complains of some nausea with epigastric and substernal discomfort which could be secondary to esophagitis. Also few episodes of emesis and melena rule out obstruction. Will check KUB today. If significant stool burden is noted then patient would be started on bowel regime/if bowel obstruction or significant air-fluid levels are noted then patient may have to shift to low fiber/liquid diet depending on severity of disease. If no significant stool burden or air-fluid level exists then would recommend EGD to rule out infectious or reflux  esophagitis.  Patient was also advised that Zofran ODT is safe with regard to her concern for experimental side effect of muscular dystonia    PLAN:  1. P.r.n. Zofran ODT  2. Continue PPI  3. KUB today  4. Low-salt diet  5. P.r.n. Paracentesis  6. Continue spironolactone and Lasix at current dose  7. P.r.n. follow-up     follow up with MD PRN   Instructions provided as documented in the after visit summary.    The patient indicated understanding of the diagnosis and agreed with the plan of care.          No

## 2022-07-23 NOTE — ED PROVIDER NOTE - PATIENT PORTAL LINK FT
You can access the FollowMyHealth Patient Portal offered by [facility] by registering at the following website: [email or web address]. By joining [employer]’s FollowMyHealth portal, you will also be able to view your health information using other applications (apps) compatible with our system.

## 2022-07-23 NOTE — ED PROVIDER NOTE - PHYSICAL EXAMINATION
Constitutional: Well appearing, awake, alert, oriented to person, place, time/situation and in no apparent distress.  HEENT: Airway patent, NCAT  Eye: left eye with subconjunctival hemorrhage with baseline vision per pt, with ecchymosis to the upper eyelid and small ecchymosis to the lower eyelid without bony tenderness.   Resp: no conversational dyspnea, tachypnea, or signs of respiratory distress  Msk: ambulating with normal steady gait  Neuro: A&Ox3

## 2022-07-23 NOTE — ED PROVIDER NOTE - OBJECTIVE STATEMENT
82 y/o M with PMHx of BPH and IBSC complaining abdominal pain and constipation. Per pt, last BM was last night, small hard stool, still passing flatus. Here due to uncomfortability of constipation and nausea, was seen earlier today and provided Rx for reglan which he states made his condition initially better then worse. Last saw his GI two days ago and nothing done per pt. Pt reports that he is on a special diet due to his IBSC and is eating a lot of fiber and oats and grains. Previous abd surgeries and hernia repair, no masses. Pt is still tolerating PO.

## 2022-07-23 NOTE — ED PROVIDER NOTE - PATIENT PORTAL LINK FT
You can access the FollowMyHealth Patient Portal offered by [facility] by registering at the following website: [email or web address]. By joining [employer]’s FollowMyHealth portal, you will also be able to view your health information using other applications (apps) compatible with our system.

## 2022-07-23 NOTE — ED PROVIDER NOTE - NSFOLLOWUPINSTRUCTIONS_ED_ALL_ED_FT
You are here for Constipation and abdominal pain.    You should follow up with your primary care doctor and your GASTROENTEROLOGIST for further recommendations.     In the meantime, please eat lots of fiber and drink plenty of fluids.     Take your miralax as prescribed by your GASTROENTEROLOGIST.

## 2022-07-23 NOTE — ED ADULT NURSE NOTE - NSIMPLEMENTINTERV_GEN_ALL_ED
Implemented All Universal Safety Interventions:  [place] to call system. Call bell, personal items and telephone within reach. Instruct patient to call for assistance. Room bathroom lighting operational. Non-slip footwear when patient is off stretcher. Physically safe environment: no spills, clutter or unnecessary equipment. Stretcher in lowest position, wheels locked, appropriate side rails in place.

## 2022-07-23 NOTE — ED ADULT TRIAGE NOTE - CHIEF COMPLAINT QUOTE
pt returns stating " iwas here yesterday my eye is no better", saw eye dr yesterday also , denies trauma, no blood thinners, talking antibiotics drops, denies pain left eye

## 2022-07-24 DIAGNOSIS — Z90.89 ACQUIRED ABSENCE OF OTHER ORGANS: ICD-10-CM
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
## 2022-07-24 DIAGNOSIS — H11.32 CONJUNCTIVAL HEMORRHAGE, LEFT EYE: ICD-10-CM

## 2022-07-24 DIAGNOSIS — K57.90 DIVERTICULOSIS OF INTESTINE, PART UNSPECIFIED, WITHOUT PERFORATION OR ABSCESS WITHOUT BLEEDING: ICD-10-CM

## 2022-07-24 DIAGNOSIS — I10 ESSENTIAL (PRIMARY) HYPERTENSION: ICD-10-CM

## 2022-07-24 DIAGNOSIS — K59.00 CONSTIPATION, UNSPECIFIED: ICD-10-CM

## 2022-07-24 DIAGNOSIS — R10.9 UNSPECIFIED ABDOMINAL PAIN: ICD-10-CM

## 2022-07-24 DIAGNOSIS — N40.0 BENIGN PROSTATIC HYPERPLASIA WITHOUT LOWER URINARY TRACT SYMPTOMS: ICD-10-CM

## 2022-07-24 DIAGNOSIS — K52.9 NONINFECTIVE GASTROENTERITIS AND COLITIS, UNSPECIFIED: ICD-10-CM

## 2022-07-24 DIAGNOSIS — K64.9 UNSPECIFIED HEMORRHOIDS: ICD-10-CM

## 2022-07-24 DIAGNOSIS — H57.12 OCULAR PAIN, LEFT EYE: ICD-10-CM
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
## 2022-07-24 DIAGNOSIS — K59.09 OTHER CONSTIPATION: ICD-10-CM

## 2022-07-24 DIAGNOSIS — S05.02XA INJURY OF CONJUNCTIVA AND CORNEAL ABRASION WITHOUT FOREIGN BODY, LEFT EYE, INITIAL ENCOUNTER: ICD-10-CM

## 2022-07-25 DIAGNOSIS — Z90.49 ACQUIRED ABSENCE OF OTHER SPECIFIED PARTS OF DIGESTIVE TRACT: ICD-10-CM

## 2022-07-25 DIAGNOSIS — H57.12 OCULAR PAIN, LEFT EYE: ICD-10-CM

## 2022-07-25 DIAGNOSIS — N40.0 BENIGN PROSTATIC HYPERPLASIA WITHOUT LOWER URINARY TRACT SYMPTOMS: ICD-10-CM

## 2022-07-25 DIAGNOSIS — K64.9 UNSPECIFIED HEMORRHOIDS: ICD-10-CM

## 2022-07-25 DIAGNOSIS — K52.9 NONINFECTIVE GASTROENTERITIS AND COLITIS, UNSPECIFIED: ICD-10-CM

## 2022-07-25 DIAGNOSIS — I10 ESSENTIAL (PRIMARY) HYPERTENSION: ICD-10-CM

## 2022-07-25 DIAGNOSIS — K57.90 DIVERTICULOSIS OF INTESTINE, PART UNSPECIFIED, WITHOUT PERFORATION OR ABSCESS WITHOUT BLEEDING: ICD-10-CM

## 2022-07-25 DIAGNOSIS — H11.32 CONJUNCTIVAL HEMORRHAGE, LEFT EYE: ICD-10-CM

## 2022-07-26 ENCOUNTER — EMERGENCY (EMERGENCY)
Facility: HOSPITAL | Age: 82
LOS: 1 days | Discharge: HOME | End: 2022-07-26
Attending: EMERGENCY MEDICINE | Admitting: EMERGENCY MEDICINE

## 2022-07-26 VITALS
RESPIRATION RATE: 18 BRPM | TEMPERATURE: 98 F | HEIGHT: 72 IN | DIASTOLIC BLOOD PRESSURE: 100 MMHG | SYSTOLIC BLOOD PRESSURE: 178 MMHG | HEART RATE: 48 BPM | OXYGEN SATURATION: 98 %

## 2022-07-26 VITALS — SYSTOLIC BLOOD PRESSURE: 215 MMHG | DIASTOLIC BLOOD PRESSURE: 85 MMHG

## 2022-07-26 DIAGNOSIS — Z90.49 ACQUIRED ABSENCE OF OTHER SPECIFIED PARTS OF DIGESTIVE TRACT: Chronic | ICD-10-CM

## 2022-07-26 LAB
ALBUMIN SERPL ELPH-MCNC: 3.5 G/DL — SIGNIFICANT CHANGE UP (ref 3.4–5)
ALP SERPL-CCNC: 70 U/L — SIGNIFICANT CHANGE UP (ref 40–120)
ALT FLD-CCNC: 18 U/L — SIGNIFICANT CHANGE UP (ref 12–42)
ANION GAP SERPL CALC-SCNC: 8 MMOL/L — LOW (ref 9–16)
APTT BLD: 30 SEC — SIGNIFICANT CHANGE UP (ref 27.5–35.5)
AST SERPL-CCNC: 29 U/L — SIGNIFICANT CHANGE UP (ref 15–37)
BASOPHILS # BLD AUTO: 0.02 K/UL — SIGNIFICANT CHANGE UP (ref 0–0.2)
BASOPHILS NFR BLD AUTO: 0.3 % — SIGNIFICANT CHANGE UP (ref 0–2)
BILIRUB SERPL-MCNC: 0.8 MG/DL — SIGNIFICANT CHANGE UP (ref 0.2–1.2)
BUN SERPL-MCNC: 21 MG/DL — SIGNIFICANT CHANGE UP (ref 7–23)
CALCIUM SERPL-MCNC: 9 MG/DL — SIGNIFICANT CHANGE UP (ref 8.5–10.5)
CHLORIDE SERPL-SCNC: 102 MMOL/L — SIGNIFICANT CHANGE UP (ref 96–108)
CO2 SERPL-SCNC: 26 MMOL/L — SIGNIFICANT CHANGE UP (ref 22–31)
CREAT SERPL-MCNC: 1.2 MG/DL — SIGNIFICANT CHANGE UP (ref 0.5–1.3)
EGFR: 60 ML/MIN/1.73M2 — SIGNIFICANT CHANGE UP
EOSINOPHIL # BLD AUTO: 0.04 K/UL — SIGNIFICANT CHANGE UP (ref 0–0.5)
EOSINOPHIL NFR BLD AUTO: 0.6 % — SIGNIFICANT CHANGE UP (ref 0–6)
GLUCOSE SERPL-MCNC: 107 MG/DL — HIGH (ref 70–99)
HCT VFR BLD CALC: 38.7 % — LOW (ref 39–50)
HGB BLD-MCNC: 13.6 G/DL — SIGNIFICANT CHANGE UP (ref 13–17)
IMM GRANULOCYTES NFR BLD AUTO: 0.5 % — SIGNIFICANT CHANGE UP (ref 0–1.5)
LYMPHOCYTES # BLD AUTO: 1.48 K/UL — SIGNIFICANT CHANGE UP (ref 1–3.3)
LYMPHOCYTES # BLD AUTO: 22.9 % — SIGNIFICANT CHANGE UP (ref 13–44)
MAGNESIUM SERPL-MCNC: 2 MG/DL — SIGNIFICANT CHANGE UP (ref 1.6–2.6)
MCHC RBC-ENTMCNC: 34.1 PG — HIGH (ref 27–34)
MCHC RBC-ENTMCNC: 35.1 GM/DL — SIGNIFICANT CHANGE UP (ref 32–36)
MCV RBC AUTO: 97 FL — SIGNIFICANT CHANGE UP (ref 80–100)
MONOCYTES # BLD AUTO: 0.43 K/UL — SIGNIFICANT CHANGE UP (ref 0–0.9)
MONOCYTES NFR BLD AUTO: 6.7 % — SIGNIFICANT CHANGE UP (ref 2–14)
NEUTROPHILS # BLD AUTO: 4.45 K/UL — SIGNIFICANT CHANGE UP (ref 1.8–7.4)
NEUTROPHILS NFR BLD AUTO: 69 % — SIGNIFICANT CHANGE UP (ref 43–77)
NRBC # BLD: 0 /100 WBCS — SIGNIFICANT CHANGE UP (ref 0–0)
NT-PROBNP SERPL-SCNC: 128 PG/ML — SIGNIFICANT CHANGE UP
PLATELET # BLD AUTO: 208 K/UL — SIGNIFICANT CHANGE UP (ref 150–400)
POTASSIUM SERPL-MCNC: 4.1 MMOL/L — SIGNIFICANT CHANGE UP (ref 3.5–5.3)
POTASSIUM SERPL-SCNC: 4.1 MMOL/L — SIGNIFICANT CHANGE UP (ref 3.5–5.3)
PROT SERPL-MCNC: 6.5 G/DL — SIGNIFICANT CHANGE UP (ref 6.4–8.2)
RBC # BLD: 3.99 M/UL — LOW (ref 4.2–5.8)
RBC # FLD: 11.9 % — SIGNIFICANT CHANGE UP (ref 10.3–14.5)
SARS-COV-2 RNA SPEC QL NAA+PROBE: SIGNIFICANT CHANGE UP
SODIUM SERPL-SCNC: 136 MMOL/L — SIGNIFICANT CHANGE UP (ref 132–145)
TROPONIN I, HIGH SENSITIVITY RESULT: <4 NG/L — SIGNIFICANT CHANGE UP
WBC # BLD: 6.45 K/UL — SIGNIFICANT CHANGE UP (ref 3.8–10.5)
WBC # FLD AUTO: 6.45 K/UL — SIGNIFICANT CHANGE UP (ref 3.8–10.5)

## 2022-07-26 PROCEDURE — 71046 X-RAY EXAM CHEST 2 VIEWS: CPT | Mod: 26

## 2022-07-26 PROCEDURE — 99285 EMERGENCY DEPT VISIT HI MDM: CPT | Mod: 25

## 2022-07-26 PROCEDURE — 93010 ELECTROCARDIOGRAM REPORT: CPT

## 2022-07-26 RX ORDER — ONDANSETRON 8 MG/1
4 TABLET, FILM COATED ORAL ONCE
Refills: 0 | Status: COMPLETED | OUTPATIENT
Start: 2022-07-26 | End: 2022-07-26

## 2022-07-26 RX ORDER — IPRATROPIUM/ALBUTEROL SULFATE 18-103MCG
3 AEROSOL WITH ADAPTER (GRAM) INHALATION ONCE
Refills: 0 | Status: COMPLETED | OUTPATIENT
Start: 2022-07-26 | End: 2022-07-26

## 2022-07-26 RX ORDER — METOPROLOL TARTRATE 50 MG
25 TABLET ORAL ONCE
Refills: 0 | Status: COMPLETED | OUTPATIENT
Start: 2022-07-26 | End: 2022-07-26

## 2022-07-26 RX ORDER — METOCLOPRAMIDE HCL 10 MG
10 TABLET ORAL ONCE
Refills: 0 | Status: COMPLETED | OUTPATIENT
Start: 2022-07-26 | End: 2022-07-26

## 2022-07-26 RX ADMIN — Medication 10 MILLIGRAM(S): at 19:06

## 2022-07-26 RX ADMIN — Medication 3 MILLILITER(S): at 16:54

## 2022-07-26 RX ADMIN — Medication 20 MILLIGRAM(S): at 19:07

## 2022-07-26 RX ADMIN — Medication 25 MILLIGRAM(S): at 19:07

## 2022-07-26 RX ADMIN — ONDANSETRON 4 MILLIGRAM(S): 8 TABLET, FILM COATED ORAL at 16:57

## 2022-07-26 NOTE — ED ADULT NURSE NOTE - OBJECTIVE STATEMENT
65 y/o F pmh MO ( BMI 38), HEATHER, HTN, HLD, GERD, IBS Asthma, presents for Laparoscopic Sleeve Gastrectomy. Attempted lifestyle modification w/o success. She seeks Bariatric Surgery to aid in weight loss. Denies nausea, emesis, cp, sob.
PT aox3 with steady gait. Pt states sob x 3 days. pt denies cough or fevers. Pt has bruising around left eye and blood shot eye. pt denies falls or trauma. complete sentences while sitting down

## 2022-07-26 NOTE — ED PROVIDER NOTE - OBJECTIVE STATEMENT
Pt is an 80 yo male with a PMHx of hypertension [Enalapril], BPH [Tamsulosin, Finasteride], IBS, past surgical hx of appendectomy, tonsillectomy, hernia repair, with frequent ED visits presents today with SOB which has been ongoing for 3 days now. Pt states for past 3 days he has been having on and of SOB which worsens with exertion. He also stated sometimes unable to lay flat to sleep, however that is inconsistent. Pt notes having no chest pain. Pt states chronic abdominal pain secondary to IBS , and is followed by GI doctor. Pt also notes he was evaluated for a left eye subconjunctival hemorrhage that has persisted over the past few days and that "they found nothing." He denies any fevers, chills, or cough. He is a past smoker, quit smoking 30 years ago, prior to which he smoked 1 pack a day. Pt also states he smokes marijuana every day, but has not smoked in past 3 days due to SOB symptoms. Pt denies alcohol use or any other drugs. Pt denies any history of CAD or heart failure. He is fully vaccinated for COVID-19 with 1 booster. Pt lives by himself independently at home without any home health aides and walks without any assistance. Pt is an 82 yo male with a PMHx of hypertension [Enalapril], BPH [Tamsulosin, Finasteride], IBS, past surgical hx of appendectomy, tonsillectomy, hernia repair, with frequent ED visits, presents today with SOB which has been ongoing for 3 days now. Pt states for past 3 days he has been having on and of SOB which worsens with exertion. He also stated sometimes unable to lay flat to sleep, however that is inconsistent. Pt notes having no chest pain. Pt states chronic abdominal pain and nausea secondary to IBS , and is followed by a GI doctor. Pt also notes he was evaluated for a left eye subconjunctival hemorrhage and ashley-orbital bruising that has persisted over the past few days and that "they found nothing." Denies trauma/ injury. He denies any fevers, chills, or cough. He is a past smoker, quit smoking 30 years ago, prior to which he smoked 1 pack a day. Pt also states he smokes marijuana every day, but has not smoked in past 3 days due to SOB symptoms. Pt denies alcohol use or any other drugs. Pt denies any history of CAD or heart failure. He is fully vaccinated for COVID-19 with 1 booster. Pt lives by himself independently at home without any home health aides and walks without any assistance.

## 2022-07-26 NOTE — ED PROVIDER NOTE - CARE PLAN
Principal Discharge DX:	Shortness of breath  Secondary Diagnosis:	Nausea  Secondary Diagnosis:	Hypertension   1

## 2022-07-26 NOTE — ED ADULT TRIAGE NOTE - CHIEF COMPLAINT QUOTE
pt presents with complaint of shortness of breath and requesting a sedative. pt endorses low HR at baseline.

## 2022-07-26 NOTE — ED PROVIDER NOTE - EYES, MLM
Left subconjunctival hemorrhage noted with bruising noted around left periorbital. Left subconjunctival hemorrhage noted with bruising noted around left periorbital area

## 2022-07-26 NOTE — ED PROVIDER NOTE - CLINICAL SUMMARY MEDICAL DECISION MAKING FREE TEXT BOX
Pt is an 82 yo male with a past medical history of hypertension, BPH, IBS and past surgical history of appendectomy, tonsillectomy, hernia repair with frequent ED visits presents to the ED on and off SOB for past 3 days. Due to patient's age, will do workup. Will do labs including cardiac enzymes, EKG, chest X ray, 2 duo nebs, and will reevaluate. Pt is an 82 yo male with a past medical history of hypertension, BPH, IBS and past surgical history of appendectomy, tonsillectomy, hernia repair with frequent ED visits presents to the ED with on and off SOB for past 3 days. Pt is a former tobacco smoker and smokes marijuana almost daily. Will do labs including cardiac enzymes, EKG, chest X ray, give 2 duo nebs, and will reevaluate.  ED course: VS noted. Pt hypertensive in ED, pt initially bradycardic (baseline per pt) rest of VS WNL. ECG with HR in 60s and no acute findings. CXR with NAD. Pt is an 82 yo male with a past medical history of hypertension, BPH, IBS and past surgical history of appendectomy, tonsillectomy, hernia repair with frequent ED visits presents to the ED with on and off SOB for past 3 days. Pt is a former tobacco smoker and smokes marijuana almost daily. Will do labs including cardiac enzymes, EKG, chest X ray, give 2 duo nebs, and will reevaluate.  ED course: VS noted. Pt hypertensive in ED, pt initially bradycardic (baseline per pt) rest of VS WNL. ECG with HR in 60s and no acute findings. CXR with NAD. Labs noted  and WNL including trop and BNP. Pt given Duonebs and Zofran. Repeat VS noted and pt noted to be hypertensive and HR in 70s- likely sec to Albuterol nebs. Given po Losartan and lopressor. Still c/o nausea after Zofran- given Reglan IV. Pt c/o feeling very anxious and jittery after Reglan. Requesting to go home. Non-toxic appearing and stable for discharge. To follow up outpatient. Strict return precautions given. Pt is an 80 yo male with a past medical history of hypertension, BPH, IBS and past surgical history of appendectomy, tonsillectomy, hernia repair with frequent ED visits presents to the ED with on and off SOB for past 3 days. Pt is a former tobacco smoker and smokes marijuana almost daily. Will do labs including cardiac enzymes, EKG, chest X ray, give 2 duo nebs, and will reevaluate.  ED course: VS noted. Pt hypertensive in ED, pt initially bradycardic (baseline per pt) rest of VS WNL. ECG with HR in 60s and no acute findings. CXR with NAD. Labs noted  and WNL including trop and BNP. Pt given Duonebs and Zofran. Repeat VS noted and pt noted to be hypertensive and HR in 70s- likely sec to Albuterol nebs. Given po Enalapril and lopressor. Still c/o nausea after Zofran- given Reglan IV. Pt c/o feeling very anxious and "jittery" after Reglan. Requesting to go home and does not want to stay in ED anymore. Non-toxic appearing and stable for discharge. To follow up outpatient. Strict return precautions given.

## 2022-07-26 NOTE — ED PROVIDER NOTE - NSFOLLOWUPCLINICS_GEN_ALL_ED_FT
[facility]  Family Medicine  [address]  [place], [place] [address]  Phone: [phone]  Fax:   Follow Up Time: 4-6 Days

## 2022-07-26 NOTE — ED PROVIDER NOTE - PATIENT PORTAL LINK FT
You can access the FollowMyHealth Patient Portal offered by [facility] by registering at the following website: [email or web address]. By joining [employer]’s FollowMyHealth portal, you will also be able to view your health information using other applications (apps) compatible with our system.

## 2022-07-26 NOTE — ED PROVIDER NOTE - CONTEXT
Quit smoking 30 years ago in 1992, prior to which he smoked 1 pack a day, smokes marijuana every day but has not smoked in psat 3 days due to SOB symptoms, vaccinated for COVID with 1 booster

## 2022-07-26 NOTE — ED PROVIDER NOTE - NSICDXPASTSURGICALHX_GEN_ALL_CORE_FT
PAST SURGICAL HISTORY:  History of appendectomy       H/O hernia repair     History of tonsillectomy

## 2022-07-26 NOTE — ED PROVIDER NOTE - NSFOLLOWUPINSTRUCTIONS_ED_ALL_ED_FT
Please follow up with your primary care doctor in 3-4 days. Return to the ER if you develop any concerning symptoms.     Shortness of breath    Shortness of breath (dyspnea) means you have trouble breathing and could indicate a medical problem. Causes include lung disease, heart disease, low amount of red blood cells (anemia), poor physical fitness, being overweight, smoking, etc. Your health care provider today may not be able to find a cause for your shortness of breath after your exam. In this case, it is important to have a follow-up exam with your primary care physician as instructed. If medicines were prescribed, take them as directed for the full length of time directed. Refrain from tobacco products.    SEEK IMMEDIATE MEDICAL CARE IF YOU HAVE ANY OF THE FOLLOWING SYMPTOMS: worsening shortness of breath, chest pain, back pain, abdominal pain, fever, coughing up blood, lightheadedness/dizziness.    Hypertension    Hypertension, commonly called high blood pressure, is when the force of blood pumping through your arteries is too strong. Hypertension forces your heart to work harder to pump blood. Your arteries may become narrow or stiff. Having untreated or uncontrolled hypertension for a long period of time can cause heart attack, stroke, kidney disease, and other problems. If started on a medication, take exactly as prescribed by your health care professional. Maintain a healthy lifestyle and follow up with your primary care physician.    SEEK IMMEDIATE MEDICAL CARE IF YOU HAVE ANY OF THE FOLLOWING SYMPTOMS: severe headache, confusion, chest pain, abdominal pain, vomiting, or shortness of breath.      Nausea / Vomiting    Nausea is the feeling that you have to vomit. As nausea gets worse, it can lead to vomiting. Vomiting puts you at an increased risk for dehydration. Older adults and people with other diseases or a weak immune system are at higher risk for dehydration. Drink clear fluids in small but frequent amounts as tolerated. Eat bland, easy-to-digest foods in small amounts as tolerated.    SEEK IMMEDIATE MEDICAL CARE IF YOU HAVE ANY OF THE FOLLOWING SYMPTOMS: fever, inability to keep sufficient fluids down, black or bloody vomitus, black or bloody stools, lightheadedness/dizziness, chest pain, severe headache, rash, shortness of breath, cold or clammy skin, confusion, pain with urination, or any signs of dehydration.

## 2022-07-27 DIAGNOSIS — Z90.89 ACQUIRED ABSENCE OF OTHER ORGANS: Chronic | ICD-10-CM

## 2022-07-27 DIAGNOSIS — I10 ESSENTIAL (PRIMARY) HYPERTENSION: ICD-10-CM

## 2022-07-27 DIAGNOSIS — Z98.890 OTHER SPECIFIED POSTPROCEDURAL STATES: ICD-10-CM

## 2022-07-27 DIAGNOSIS — R06.02 SHORTNESS OF BREATH: ICD-10-CM

## 2022-07-27 DIAGNOSIS — Z90.89 ACQUIRED ABSENCE OF OTHER ORGANS: ICD-10-CM

## 2022-07-27 DIAGNOSIS — R11.0 NAUSEA: ICD-10-CM

## 2022-07-27 DIAGNOSIS — K58.9 IRRITABLE BOWEL SYNDROME WITHOUT DIARRHEA: ICD-10-CM

## 2022-07-27 DIAGNOSIS — Z20.822 CONTACT WITH AND (SUSPECTED) EXPOSURE TO COVID-19: ICD-10-CM

## 2022-07-27 DIAGNOSIS — I49.1 ATRIAL PREMATURE DEPOLARIZATION: ICD-10-CM

## 2022-07-27 DIAGNOSIS — Z98.890 OTHER SPECIFIED POSTPROCEDURAL STATES: Chronic | ICD-10-CM

## 2022-07-27 DIAGNOSIS — I44.4 LEFT ANTERIOR FASCICULAR BLOCK: ICD-10-CM

## 2022-07-27 DIAGNOSIS — Z87.891 PERSONAL HISTORY OF NICOTINE DEPENDENCE: ICD-10-CM

## 2022-07-27 DIAGNOSIS — F12.90 CANNABIS USE, UNSPECIFIED, UNCOMPLICATED: ICD-10-CM

## 2022-07-27 DIAGNOSIS — N40.0 BENIGN PROSTATIC HYPERPLASIA WITHOUT LOWER URINARY TRACT SYMPTOMS: ICD-10-CM

## 2022-08-09 ENCOUNTER — EMERGENCY (EMERGENCY)
Facility: HOSPITAL | Age: 82
LOS: 1 days | Discharge: HOME | End: 2022-08-09
Attending: EMERGENCY MEDICINE | Admitting: EMERGENCY MEDICINE

## 2022-08-09 VITALS
RESPIRATION RATE: 18 BRPM | WEIGHT: 132.06 LBS | DIASTOLIC BLOOD PRESSURE: 87 MMHG | SYSTOLIC BLOOD PRESSURE: 143 MMHG | HEART RATE: 53 BPM | HEIGHT: 70 IN | TEMPERATURE: 98 F | OXYGEN SATURATION: 98 %

## 2022-08-09 DIAGNOSIS — Z90.89 ACQUIRED ABSENCE OF OTHER ORGANS: Chronic | ICD-10-CM

## 2022-08-09 DIAGNOSIS — Z98.890 OTHER SPECIFIED POSTPROCEDURAL STATES: Chronic | ICD-10-CM

## 2022-08-09 DIAGNOSIS — Z90.49 ACQUIRED ABSENCE OF OTHER SPECIFIED PARTS OF DIGESTIVE TRACT: Chronic | ICD-10-CM

## 2022-08-09 LAB
APPEARANCE UR: CLEAR — SIGNIFICANT CHANGE UP
BACTERIA # UR AUTO: PRESENT /HPF
BILIRUB UR-MCNC: ABNORMAL
COLOR SPEC: YELLOW — SIGNIFICANT CHANGE UP
COMMENT - URINE: SIGNIFICANT CHANGE UP
DIFF PNL FLD: NEGATIVE — SIGNIFICANT CHANGE UP
GLUCOSE BLDC GLUCOMTR-MCNC: 123 MG/DL — HIGH (ref 70–99)
GLUCOSE UR QL: NEGATIVE — SIGNIFICANT CHANGE UP
KETONES UR-MCNC: ABNORMAL MG/DL
LEUKOCYTE ESTERASE UR-ACNC: NEGATIVE — SIGNIFICANT CHANGE UP
NITRITE UR-MCNC: NEGATIVE — SIGNIFICANT CHANGE UP
PH UR: 7.5 — SIGNIFICANT CHANGE UP (ref 5–8)
PROT UR-MCNC: ABNORMAL MG/DL
RBC CASTS # UR COMP ASSIST: < 5 /HPF — SIGNIFICANT CHANGE UP
SP GR SPEC: 1.01 — SIGNIFICANT CHANGE UP (ref 1–1.03)
UROBILINOGEN FLD QL: 1 E.U./DL — SIGNIFICANT CHANGE UP
WBC UR QL: < 5 /HPF — SIGNIFICANT CHANGE UP

## 2022-08-09 PROCEDURE — 99284 EMERGENCY DEPT VISIT MOD MDM: CPT

## 2022-08-09 PROCEDURE — 93010 ELECTROCARDIOGRAM REPORT: CPT

## 2022-08-09 NOTE — ED ADULT NURSE NOTE - OBJECTIVE STATEMENT
80 y/o male who is here c/o weakness and disorientation - was also recently placed on abx for ear infection- pt is A+Ox3 denies chest pain, sob, dizziness and ambulates with steady gait

## 2022-08-09 NOTE — ED ADULT TRIAGE NOTE - CHIEF COMPLAINT QUOTE
BIBA from  for further evaluation of weakness and disorientation for a year. as per EMS and pt, has been treated for ear infection with ear drops.

## 2022-08-09 NOTE — ED PROVIDER NOTE - NSFOLLOWUPCLINICS_GEN_ALL_ED_FT
[facility]  Family Medicine  [address]  [place], [place] [address]  Phone: [phone]  Fax:   Follow Up Time: 4-6 Days

## 2022-08-09 NOTE — ED PROVIDER NOTE - CONSTITUTIONAL, MLM
normal... Well appearing, awake, alert, oriented  and in no apparent distress. Ambulating normally in ED.

## 2022-08-09 NOTE — ED PROVIDER NOTE - PATIENT PORTAL LINK FT
You can access the FollowMyHealth Patient Portal offered by [facility] by registering at the following website: [email or web address]. By joining [employer]’s FollowMyHealth portal, you will also be able to view your health information using other applications (apps) compatible with our system.

## 2022-08-09 NOTE — ED PROVIDER NOTE - CLINICAL SUMMARY MEDICAL DECISION MAKING FREE TEXT BOX
82 yo male with a PMHx of hypertension [Enalapril], BPH [Tamsulosin, Finasteride], IBS, past surgical hx of appendectomy, tonsillectomy, hernia repair, gastritis with frequent ED visits, presents with generalized weakness X 1 year. Pt, has been treated for ear infection with ear drops  recently and states that "they cleaned it out". Denies CP, SOB. C/o chronic crampy abd pain and "blood in urine" without dysuria. Denies trauma/ injury. He denies any fevers, chills, or cough, HA, focal neuro sxs. He is a past smoker, quit smoking 30 years ago, prior to which he smoked 1 pack a day. Pt also states he smokes marijuana regularly. Pt denies alcohol use or any other drugs. Pt denies any history of CAD or heart failure. He is fully vaccinated for COVID-19 with 1 booster. Pt lives by himself independently without home health aides.  ED course: VS noted. Pt bradycardic (baseline for pt), mildly hypertensive, rest of VS WNL. ECG with sinus bradycardia (baseline for pt). FSBG WNL. UA with no infection. Pt with normal exam. Reassured. TO f/up outpt with PCP and Urologist. Non-toxic appearing and stable for discharge. To follow up outpatient. Strict return precautions given.

## 2022-08-09 NOTE — ED PROVIDER NOTE - NSFOLLOWUPINSTRUCTIONS_ED_ALL_ED_FT
Please follow up with your primary care doctor and your ENT and primary care doctor in 3-4 days. Return to the ER if you develop any concerning symptoms.      Weakness      Weakness is a lack of strength. You may feel weak all over your body (generalized), or you may feel weak in one part of your body (focal). There are many potential causes of weakness. Sometimes, the cause of your weakness may not be known. Some causes of weakness can be serious, so it is important to see your doctor.      Follow these instructions at home:    Activity     •Rest as needed.      •Try to get enough sleep. Most adults need 7–8 hours of sleep each night. Talk to your doctor about how much sleep you need each night.      •Do exercises, such as arm curls and leg raises, for 30 minutes at least 2 days a week or as told by your doctor.      •Think about working with a physical therapist or  to help you get stronger.        General instructions      •Take over-the-counter and prescription medicines only as told by your doctor.    •Eat a healthy, well-balanced diet. This includes:  •Proteins to build muscles, such as lean meats and fish.      •Fresh fruits and vegetables.      •Carbohydrates to boost energy, such as whole grains.        •Drink enough fluid to keep your pee (urine) pale yellow.      •Keep all follow-up visits as told by your doctor. This is important.        Contact a doctor if:    •Your weakness does not get better or it gets worse.    •Your weakness affects your ability to:  •Think clearly.      •Do your normal daily activities.          Get help right away if you:    •Have sudden weakness on one side of your face or body.      •Have chest pain.      •Have trouble breathing or shortness of breath.      •Have problems with your vision.      •Have trouble talking or swallowing.      •Have trouble standing or walking.      •Are light-headed.      •Pass out (lose consciousness).        Summary    •Weakness is a lack of strength. You may feel weak all over your body or just in one part of your body.      •There are many potential causes of weakness. Sometimes, the cause of your weakness may not be known.      •Rest as needed, and try to get enough sleep. Most adults need 7–8 hours of sleep each night.      •Eat a healthy, well-balanced diet.      This information is not intended to replace advice given to you by your health care provider. Make sure you discuss any questions you have with your health care provider.

## 2022-08-09 NOTE — ED PROVIDER NOTE - OBJECTIVE STATEMENT
80 yo male with a PMHx of hypertension [Enalapril], BPH [Tamsulosin, Finasteride], IBS, past surgical hx of appendectomy, tonsillectomy, hernia repair, gastritis with frequent ED visits, presents with generalized weakness X 1 year. Pt, has been treated for ear infection with ear drops  recently and states that "they cleaned it out". Denies CP, SOB. C/o chronic crampy abd pain and "blood in urine" without dysuria. Denies trauma/ injury. He denies any fevers, chills, or cough, HA, focal neuro sxs. He is a past smoker, quit smoking 30 years ago, prior to which he smoked 1 pack a day. Pt also states he smokes marijuana regularly. Pt denies alcohol use or any other drugs. Pt denies any history of CAD or heart failure. He is fully vaccinated for COVID-19 with 1 booster. Pt lives by himself independently without home health aides.

## 2022-08-11 DIAGNOSIS — R31.9 HEMATURIA, UNSPECIFIED: ICD-10-CM

## 2022-08-11 DIAGNOSIS — Z87.891 PERSONAL HISTORY OF NICOTINE DEPENDENCE: ICD-10-CM

## 2022-08-11 DIAGNOSIS — K58.9 IRRITABLE BOWEL SYNDROME WITHOUT DIARRHEA: ICD-10-CM

## 2022-08-11 DIAGNOSIS — Z90.89 ACQUIRED ABSENCE OF OTHER ORGANS: ICD-10-CM

## 2022-08-11 DIAGNOSIS — R00.1 BRADYCARDIA, UNSPECIFIED: ICD-10-CM

## 2022-08-11 DIAGNOSIS — R53.1 WEAKNESS: ICD-10-CM

## 2022-08-11 DIAGNOSIS — G89.29 OTHER CHRONIC PAIN: ICD-10-CM

## 2022-08-11 DIAGNOSIS — Z98.890 OTHER SPECIFIED POSTPROCEDURAL STATES: ICD-10-CM

## 2022-08-11 DIAGNOSIS — Z28.311 PARTIALLY VACCINATED FOR COVID-19: ICD-10-CM

## 2022-08-11 DIAGNOSIS — R10.9 UNSPECIFIED ABDOMINAL PAIN: ICD-10-CM

## 2022-08-11 DIAGNOSIS — I10 ESSENTIAL (PRIMARY) HYPERTENSION: ICD-10-CM

## 2022-08-11 DIAGNOSIS — N40.0 BENIGN PROSTATIC HYPERPLASIA WITHOUT LOWER URINARY TRACT SYMPTOMS: ICD-10-CM

## 2022-08-11 DIAGNOSIS — I49.8 OTHER SPECIFIED CARDIAC ARRHYTHMIAS: ICD-10-CM

## 2022-08-11 NOTE — ED POST DISCHARGE NOTE - DETAILS
8/11/22 pt offered abx but refused due to sensitivity to abx.  He would prefer to f/u with PCP for repeat UA/ urine culture since he is not symptomatic.

## 2022-08-21 ENCOUNTER — EMERGENCY (EMERGENCY)
Facility: HOSPITAL | Age: 82
LOS: 1 days | Discharge: HOME | End: 2022-08-21
Attending: EMERGENCY MEDICINE | Admitting: EMERGENCY MEDICINE

## 2022-08-21 VITALS
RESPIRATION RATE: 18 BRPM | HEIGHT: 70 IN | WEIGHT: 136.03 LBS | HEART RATE: 56 BPM | DIASTOLIC BLOOD PRESSURE: 84 MMHG | TEMPERATURE: 98 F | SYSTOLIC BLOOD PRESSURE: 146 MMHG | OXYGEN SATURATION: 98 %

## 2022-08-21 VITALS
HEART RATE: 59 BPM | TEMPERATURE: 98 F | OXYGEN SATURATION: 99 % | DIASTOLIC BLOOD PRESSURE: 78 MMHG | RESPIRATION RATE: 18 BRPM | SYSTOLIC BLOOD PRESSURE: 129 MMHG

## 2022-08-21 DIAGNOSIS — I10 ESSENTIAL (PRIMARY) HYPERTENSION: ICD-10-CM

## 2022-08-21 DIAGNOSIS — K59.00 CONSTIPATION, UNSPECIFIED: ICD-10-CM

## 2022-08-21 DIAGNOSIS — Z90.49 ACQUIRED ABSENCE OF OTHER SPECIFIED PARTS OF DIGESTIVE TRACT: Chronic | ICD-10-CM

## 2022-08-21 DIAGNOSIS — N39.0 URINARY TRACT INFECTION, SITE NOT SPECIFIED: ICD-10-CM

## 2022-08-21 DIAGNOSIS — Z98.890 OTHER SPECIFIED POSTPROCEDURAL STATES: Chronic | ICD-10-CM

## 2022-08-21 DIAGNOSIS — Z90.89 ACQUIRED ABSENCE OF OTHER ORGANS: Chronic | ICD-10-CM

## 2022-08-21 DIAGNOSIS — N40.0 BENIGN PROSTATIC HYPERPLASIA WITHOUT LOWER URINARY TRACT SYMPTOMS: ICD-10-CM

## 2022-08-21 LAB
APPEARANCE UR: CLEAR — SIGNIFICANT CHANGE UP
BACTERIA # UR AUTO: ABNORMAL /HPF
BILIRUB UR-MCNC: NEGATIVE — SIGNIFICANT CHANGE UP
COLOR SPEC: YELLOW — SIGNIFICANT CHANGE UP
DIFF PNL FLD: NEGATIVE — SIGNIFICANT CHANGE UP
EPI CELLS # UR: SIGNIFICANT CHANGE UP /HPF (ref 0–5)
GLUCOSE UR QL: NEGATIVE — SIGNIFICANT CHANGE UP
KETONES UR-MCNC: NEGATIVE — SIGNIFICANT CHANGE UP
LEUKOCYTE ESTERASE UR-ACNC: ABNORMAL
NITRITE UR-MCNC: POSITIVE
PH UR: 7.5 — SIGNIFICANT CHANGE UP (ref 5–8)
PROT UR-MCNC: NEGATIVE MG/DL — SIGNIFICANT CHANGE UP
RBC CASTS # UR COMP ASSIST: < 5 /HPF — SIGNIFICANT CHANGE UP
SP GR SPEC: 1.01 — SIGNIFICANT CHANGE UP (ref 1–1.03)
UROBILINOGEN FLD QL: 1 E.U./DL — SIGNIFICANT CHANGE UP
WBC UR QL: > 10 /HPF

## 2022-08-21 PROCEDURE — 74019 RADEX ABDOMEN 2 VIEWS: CPT | Mod: 26

## 2022-08-21 PROCEDURE — 99284 EMERGENCY DEPT VISIT MOD MDM: CPT | Mod: 25

## 2022-08-21 RX ADMIN — Medication 1 TABLET(S): at 10:51

## 2022-08-21 NOTE — ED PROVIDER NOTE - CLINICAL SUMMARY MEDICAL DECISION MAKING FREE TEXT BOX
82 y/o M presenting with constipation and lower Abd discomfort that was resolved with a soft bowel movement this morning. Plan for Abd XR and UA. Will reassess clinically after results have been obtained.

## 2022-08-21 NOTE — ED PROVIDER NOTE - OBJECTIVE STATEMENT
80 y/o M with PMH of BPH, IBD, and hypertension presents to the ED for difficulty with bowel movements. Pt reports he was "unable to have a bowel movement" for the last 3-4 days. This morning, he had an "explosive soft bowel movement." Pt was having lower Abd discomfort but the pain resolved after the bowel movement this morning. Pt also notes some difficulty urinating. He does not want a catheter because it causes "bad effects." Pt states he takes Miralax every night and uses dobule the dose at times. Pt denies fevers, chills, or N/V.

## 2022-08-21 NOTE — ED PROVIDER NOTE - PATIENT PORTAL LINK FT
You can access the FollowMyHealth Patient Portal offered by [facility] by registering at the following website: [email or web address]. By joining [employer]’s FollowMyHealth portal, you will also be able to view your health information using other applications (apps) compatible with our system.

## 2022-08-21 NOTE — ED PROVIDER NOTE - CONDITION AT DISCHARGE:
Rx Refill Note  Requested Prescriptions     Pending Prescriptions Disp Refills   • glipizide (GLUCOTROL) 10 MG tablet [Pharmacy Med Name: GLIPIZIDE 10MG TABLETS] 90 tablet 1     Sig: TAKE 1 TABLET BY MOUTH DAILY   • lisinopril (PRINIVIL,ZESTRIL) 5 MG tablet [Pharmacy Med Name: LISINOPRIL 5MG TABLETS] 90 tablet 1     Sig: TAKE 1 TABLET BY MOUTH DAILY   • traZODone (DESYREL) 100 MG tablet [Pharmacy Med Name: TRAZODONE 100MG TABLETS] 90 tablet 1     Sig: TAKE 1 TABLET BY MOUTH EVERY NIGHT AT BEDTIME      Last office visit with prescribing clinician: 1/28/22  Next office visit with prescribing clinician: 7/22/2022  Patient up to date on wellness   {Kandice Correa MA  03/28/22, 11:46 CDT     Improved

## 2022-08-21 NOTE — ED PROVIDER NOTE - NSICDXPASTSURGICALHX_GEN_ALL_CORE_FT
PAST SURGICAL HISTORY:  H/O hernia repair     History of appendectomy     History of tonsillectomy

## 2022-08-23 LAB
CULTURE RESULTS: SIGNIFICANT CHANGE UP
SPECIMEN SOURCE: SIGNIFICANT CHANGE UP

## 2022-09-03 ENCOUNTER — EMERGENCY (EMERGENCY)
Facility: HOSPITAL | Age: 82
LOS: 1 days | Discharge: HOME | End: 2022-09-03
Admitting: EMERGENCY MEDICINE

## 2022-09-03 VITALS
OXYGEN SATURATION: 100 % | RESPIRATION RATE: 16 BRPM | HEART RATE: 61 BPM | DIASTOLIC BLOOD PRESSURE: 90 MMHG | TEMPERATURE: 98 F | WEIGHT: 134.92 LBS | HEIGHT: 70 IN | SYSTOLIC BLOOD PRESSURE: 162 MMHG

## 2022-09-03 VITALS
DIASTOLIC BLOOD PRESSURE: 94 MMHG | SYSTOLIC BLOOD PRESSURE: 186 MMHG | OXYGEN SATURATION: 99 % | RESPIRATION RATE: 16 BRPM | HEART RATE: 54 BPM

## 2022-09-03 DIAGNOSIS — Z98.890 OTHER SPECIFIED POSTPROCEDURAL STATES: Chronic | ICD-10-CM

## 2022-09-03 DIAGNOSIS — Z90.89 ACQUIRED ABSENCE OF OTHER ORGANS: Chronic | ICD-10-CM

## 2022-09-03 DIAGNOSIS — Z90.49 ACQUIRED ABSENCE OF OTHER SPECIFIED PARTS OF DIGESTIVE TRACT: Chronic | ICD-10-CM

## 2022-09-03 LAB
ALBUMIN SERPL ELPH-MCNC: 4 G/DL — SIGNIFICANT CHANGE UP (ref 3.4–5)
ALP SERPL-CCNC: 81 U/L — SIGNIFICANT CHANGE UP (ref 40–120)
ALT FLD-CCNC: 28 U/L — SIGNIFICANT CHANGE UP (ref 12–42)
ANION GAP SERPL CALC-SCNC: 7 MMOL/L — LOW (ref 9–16)
APPEARANCE UR: CLEAR — SIGNIFICANT CHANGE UP
AST SERPL-CCNC: 20 U/L — SIGNIFICANT CHANGE UP (ref 15–37)
BASOPHILS # BLD AUTO: 0.02 K/UL — SIGNIFICANT CHANGE UP (ref 0–0.2)
BASOPHILS NFR BLD AUTO: 0.3 % — SIGNIFICANT CHANGE UP (ref 0–2)
BILIRUB SERPL-MCNC: 0.8 MG/DL — SIGNIFICANT CHANGE UP (ref 0.2–1.2)
BILIRUB UR-MCNC: NEGATIVE — SIGNIFICANT CHANGE UP
BUN SERPL-MCNC: 13 MG/DL — SIGNIFICANT CHANGE UP (ref 7–23)
CALCIUM SERPL-MCNC: 9.4 MG/DL — SIGNIFICANT CHANGE UP (ref 8.5–10.5)
CHLORIDE SERPL-SCNC: 95 MMOL/L — LOW (ref 96–108)
CO2 SERPL-SCNC: 28 MMOL/L — SIGNIFICANT CHANGE UP (ref 22–31)
COLOR SPEC: YELLOW — SIGNIFICANT CHANGE UP
CREAT SERPL-MCNC: 1.06 MG/DL — SIGNIFICANT CHANGE UP (ref 0.5–1.3)
DIFF PNL FLD: NEGATIVE — SIGNIFICANT CHANGE UP
EGFR: 70 ML/MIN/1.73M2 — SIGNIFICANT CHANGE UP
EOSINOPHIL # BLD AUTO: 0.06 K/UL — SIGNIFICANT CHANGE UP (ref 0–0.5)
EOSINOPHIL NFR BLD AUTO: 1 % — SIGNIFICANT CHANGE UP (ref 0–6)
GLUCOSE SERPL-MCNC: 112 MG/DL — HIGH (ref 70–99)
GLUCOSE UR QL: NEGATIVE — SIGNIFICANT CHANGE UP
HCT VFR BLD CALC: 40.2 % — SIGNIFICANT CHANGE UP (ref 39–50)
HGB BLD-MCNC: 14.2 G/DL — SIGNIFICANT CHANGE UP (ref 13–17)
IMM GRANULOCYTES NFR BLD AUTO: 0.5 % — SIGNIFICANT CHANGE UP (ref 0–1.5)
KETONES UR-MCNC: NEGATIVE — SIGNIFICANT CHANGE UP
LEUKOCYTE ESTERASE UR-ACNC: NEGATIVE — SIGNIFICANT CHANGE UP
LIDOCAIN IGE QN: 60 U/L — LOW (ref 73–393)
LYMPHOCYTES # BLD AUTO: 1.54 K/UL — SIGNIFICANT CHANGE UP (ref 1–3.3)
LYMPHOCYTES # BLD AUTO: 25.9 % — SIGNIFICANT CHANGE UP (ref 13–44)
MCHC RBC-ENTMCNC: 34.7 PG — HIGH (ref 27–34)
MCHC RBC-ENTMCNC: 35.3 GM/DL — SIGNIFICANT CHANGE UP (ref 32–36)
MCV RBC AUTO: 98.3 FL — SIGNIFICANT CHANGE UP (ref 80–100)
MONOCYTES # BLD AUTO: 0.25 K/UL — SIGNIFICANT CHANGE UP (ref 0–0.9)
MONOCYTES NFR BLD AUTO: 4.2 % — SIGNIFICANT CHANGE UP (ref 2–14)
NEUTROPHILS # BLD AUTO: 4.05 K/UL — SIGNIFICANT CHANGE UP (ref 1.8–7.4)
NEUTROPHILS NFR BLD AUTO: 68.1 % — SIGNIFICANT CHANGE UP (ref 43–77)
NITRITE UR-MCNC: NEGATIVE — SIGNIFICANT CHANGE UP
NRBC # BLD: 0 /100 WBCS — SIGNIFICANT CHANGE UP (ref 0–0)
PH UR: 7.5 — SIGNIFICANT CHANGE UP (ref 5–8)
PLATELET # BLD AUTO: 243 K/UL — SIGNIFICANT CHANGE UP (ref 150–400)
POTASSIUM SERPL-MCNC: 4.4 MMOL/L — SIGNIFICANT CHANGE UP (ref 3.5–5.3)
POTASSIUM SERPL-SCNC: 4.4 MMOL/L — SIGNIFICANT CHANGE UP (ref 3.5–5.3)
PROT SERPL-MCNC: 7.1 G/DL — SIGNIFICANT CHANGE UP (ref 6.4–8.2)
PROT UR-MCNC: NEGATIVE MG/DL — SIGNIFICANT CHANGE UP
RBC # BLD: 4.09 M/UL — LOW (ref 4.2–5.8)
RBC # FLD: 12.4 % — SIGNIFICANT CHANGE UP (ref 10.3–14.5)
SODIUM SERPL-SCNC: 130 MMOL/L — LOW (ref 132–145)
SP GR SPEC: 1.01 — SIGNIFICANT CHANGE UP (ref 1–1.03)
UROBILINOGEN FLD QL: 0.2 E.U./DL — SIGNIFICANT CHANGE UP
WBC # BLD: 5.95 K/UL — SIGNIFICANT CHANGE UP (ref 3.8–10.5)
WBC # FLD AUTO: 5.95 K/UL — SIGNIFICANT CHANGE UP (ref 3.8–10.5)

## 2022-09-03 PROCEDURE — 99285 EMERGENCY DEPT VISIT HI MDM: CPT

## 2022-09-03 PROCEDURE — 74177 CT ABD & PELVIS W/CONTRAST: CPT | Mod: 26

## 2022-09-03 RX ORDER — SOD SULF/SODIUM/NAHCO3/KCL/PEG
4000 SOLUTION, RECONSTITUTED, ORAL ORAL ONCE
Refills: 0 | Status: COMPLETED | OUTPATIENT
Start: 2022-09-03 | End: 2022-09-03

## 2022-09-03 RX ORDER — DIATRIZOATE MEGLUMINE 180 MG/ML
30 INJECTION, SOLUTION INTRAVESICAL ONCE
Refills: 0 | Status: COMPLETED | OUTPATIENT
Start: 2022-09-03 | End: 2022-09-03

## 2022-09-03 RX ORDER — KETOROLAC TROMETHAMINE 30 MG/ML
15 SYRINGE (ML) INJECTION ONCE
Refills: 0 | Status: DISCONTINUED | OUTPATIENT
Start: 2022-09-03 | End: 2022-09-03

## 2022-09-03 RX ADMIN — Medication 4000 MILLILITER(S): at 13:53

## 2022-09-03 RX ADMIN — Medication 1 ENEMA: at 13:53

## 2022-09-03 RX ADMIN — DIATRIZOATE MEGLUMINE 30 MILLILITER(S): 180 INJECTION, SOLUTION INTRAVESICAL at 11:37

## 2022-09-03 RX ADMIN — Medication 15 MILLIGRAM(S): at 11:37

## 2022-09-03 NOTE — ED PROVIDER NOTE - CLINICAL SUMMARY MEDICAL DECISION MAKING FREE TEXT BOX
well appearing pt pw lower abd pain aching mod in severity non radiating with no guarding, plan: ctap, cbc, cmp, lipase

## 2022-09-03 NOTE — ED PROVIDER NOTE - PHYSICAL EXAMINATION
Physical Exam    Vital Signs: I have reviewed the initial vital signs.  Constitutional: well-nourished, appears stated age, no acute distress  Eyes: PERRLA, and symmetrical lids.  ENT: Neck supple with no adenopathy, moist MM.  Cardiovascular: regular rate, regular rhythm, well-perfused extremities  Respiratory: unlabored respiratory effort, clear to auscultation bilaterally  Gastrointestinal: soft, +lower abd ttp, no guarding, no rebound, non-distended   Musculoskeletal: supple neck, no lower extremity edema  Integumentary: warm, dry, no rash  Neurologic: extremities’ motor and sensory functions grossly intact  Psychiatric: A&Ox3

## 2022-09-03 NOTE — ED PROVIDER NOTE - OBJECTIVE STATEMENT
82 yo m pmhx sig for htn and ibs pw decreased stooling over the course of the last 2 d with lower abd pain aching mild in severity non radiating w/o nausea or vomiting.    I have reviewed available current nursing and previous documentation of past medical, surgical, family, and/or social history.

## 2022-09-03 NOTE — ED PROVIDER NOTE - PATIENT PORTAL LINK FT
You can access the FollowMyHealth Patient Portal offered by [facility] by registering at the following website: [email or web address]. By joining [employer]’s FollowMyHealth portal, you will also be able to view your health information using other applications (apps) compatible with our system.

## 2022-09-04 ENCOUNTER — EMERGENCY (EMERGENCY)
Facility: HOSPITAL | Age: 82
LOS: 1 days | Discharge: HOME | End: 2022-09-04
Admitting: EMERGENCY MEDICINE

## 2022-09-04 VITALS
TEMPERATURE: 98 F | DIASTOLIC BLOOD PRESSURE: 66 MMHG | OXYGEN SATURATION: 98 % | HEIGHT: 70 IN | RESPIRATION RATE: 16 BRPM | WEIGHT: 134.92 LBS | HEART RATE: 58 BPM | SYSTOLIC BLOOD PRESSURE: 104 MMHG

## 2022-09-04 DIAGNOSIS — Z98.890 OTHER SPECIFIED POSTPROCEDURAL STATES: Chronic | ICD-10-CM

## 2022-09-04 DIAGNOSIS — Z90.49 ACQUIRED ABSENCE OF OTHER SPECIFIED PARTS OF DIGESTIVE TRACT: Chronic | ICD-10-CM

## 2022-09-04 DIAGNOSIS — Z90.89 ACQUIRED ABSENCE OF OTHER ORGANS: Chronic | ICD-10-CM

## 2022-09-04 PROCEDURE — 99284 EMERGENCY DEPT VISIT MOD MDM: CPT

## 2022-09-04 RX ORDER — METOCLOPRAMIDE HCL 10 MG
10 TABLET ORAL ONCE
Refills: 0 | Status: COMPLETED | OUTPATIENT
Start: 2022-09-04 | End: 2022-09-04

## 2022-09-04 RX ADMIN — Medication 20 MILLIGRAM(S): at 02:22

## 2022-09-04 RX ADMIN — Medication 10 MILLIGRAM(S): at 02:23

## 2022-09-04 NOTE — ED PROVIDER NOTE - PHYSICAL EXAMINATION
Gen - WDWN elderly M, anxious appearing, comfortable and non-toxic appearing  Skin - warm, dry, intact   HEENT - AT/NC, no nasal discharge, airway patent, neck supple and FROM  CV - S1S2, R/R/R  Resp - CTAB, no r/r/w  GI - soft, ND, no focal tenderness or guarding/rebound, no CVAT b/l   MS - No acute or gross deformities noted to extremities. No midline spinal tenderness or step off on palpation  Neuro - AxOx3, ambulatory without gait disturbance

## 2022-09-04 NOTE — ED ADULT TRIAGE NOTE - CHIEF COMPLAINT QUOTE
walk in to ED c/o lower abdominal pain, nausea, constipation. states he has been here multiple times in the past few days and was told to come back if the pain did not improve.

## 2022-09-04 NOTE — ED PROVIDER NOTE - OBJECTIVE STATEMENT
80 yo M with PMHx of BPH and IBSC complaining abdominal pain and constipation. Per pt, last BM was last night, small hard stool, still passing flatus. Here due to uncomfortability of constipation and nausea, was seen earlier today and provided Rx for reglan which he states made his condition initially better then worse. Last saw his GI two days ago and nothing done per pt. Pt reports that he is on a special diet due to his IBSC and is eating a lot of fiber and oats and grains. Previous abd surgeries and hernia repair, no masses. Pt is still tolerating PO. 80 yo M with PMHx of BPH and IBS-C with chronic constipation, seen here few hours ago for abd pain s/p labs, urine, and CT with findings consistent with fecal impaction, given golytely, po contrast, and fleet enema after dc, returns to ER due to concerns over taking multiple laxatives.  Pt reports small amount of loose stool earlier today but noted increased gas and bloating after dc and is concerned about taking too many laxatives at home.  Denies fever, chills, N/V/D, melena, hematochezia, hematuria, change in urinary function, flank pain, HA, dizziness, focal weakness, CP, SOB, palpitations, cough, and malaise. Of note, pt has tried multiple bowel regimens at home in the past - including linzess, enema, lactulose, fiber, etc, and reports wanting to return to prior bowel regimen miralax with stool softeners.

## 2022-09-04 NOTE — ED PROVIDER NOTE - CARE PROVIDER_API CALL
Ammon Segovia)  Gastroenterology; Internal Medicine  [address] [place] [address]  Phone: [phone]  Fax: [phone]  Follow Up Time:

## 2022-09-04 NOTE — ED PROVIDER NOTE - PATIENT PORTAL LINK FT
You can access the FollowMyHealth Patient Portal offered by [facility] by registering at the following website: [email or web address]. By joining [employer]’s FollowMyHealth portal, you will also be able to view your health information using other applications (apps) compatible with our system.

## 2022-09-04 NOTE — ED PROVIDER NOTE - CLINICAL SUMMARY MEDICAL DECISION MAKING FREE TEXT BOX
pt was seen here multiple times for similar issues, s/p labs, urine, and CT A/P a few hours ago and found to have findings suggestive of chronic constipation, +flatus, abd soft, counseling and bowel regimen discussed, no acute intervention indicated, AFVSS, encouraged prompt f/u with GI rather than polypharmacy and pt agrees to continue on miralax and stool softeners for now until f/u with GI next wk.

## 2022-09-04 NOTE — ED PROVIDER NOTE - NSICDXPASTSURGICALHX_GEN_ALL_CORE_FT
PAST SURGICAL HISTORY:  H/O hernia repair     History of appendectomy     History of tonsillectomy

## 2022-09-06 ENCOUNTER — EMERGENCY (EMERGENCY)
Facility: HOSPITAL | Age: 82
LOS: 1 days | Discharge: HOME | End: 2022-09-06
Admitting: STUDENT IN AN ORGANIZED HEALTH CARE EDUCATION/TRAINING PROGRAM

## 2022-09-06 VITALS
HEART RATE: 78 BPM | DIASTOLIC BLOOD PRESSURE: 96 MMHG | SYSTOLIC BLOOD PRESSURE: 165 MMHG | RESPIRATION RATE: 16 BRPM | OXYGEN SATURATION: 98 % | WEIGHT: 164.91 LBS | HEIGHT: 70 IN | TEMPERATURE: 98 F

## 2022-09-06 DIAGNOSIS — Z98.890 OTHER SPECIFIED POSTPROCEDURAL STATES: ICD-10-CM

## 2022-09-06 DIAGNOSIS — Z90.49 ACQUIRED ABSENCE OF OTHER SPECIFIED PARTS OF DIGESTIVE TRACT: Chronic | ICD-10-CM

## 2022-09-06 DIAGNOSIS — K59.00 CONSTIPATION, UNSPECIFIED: ICD-10-CM

## 2022-09-06 DIAGNOSIS — K58.9 IRRITABLE BOWEL SYNDROME WITHOUT DIARRHEA: ICD-10-CM

## 2022-09-06 DIAGNOSIS — R10.30 LOWER ABDOMINAL PAIN, UNSPECIFIED: ICD-10-CM

## 2022-09-06 DIAGNOSIS — Z91.013 ALLERGY TO SEAFOOD: ICD-10-CM

## 2022-09-06 DIAGNOSIS — Z90.89 ACQUIRED ABSENCE OF OTHER ORGANS: ICD-10-CM

## 2022-09-06 DIAGNOSIS — R14.3 FLATULENCE: ICD-10-CM

## 2022-09-06 DIAGNOSIS — R10.9 UNSPECIFIED ABDOMINAL PAIN: ICD-10-CM

## 2022-09-06 DIAGNOSIS — N40.0 BENIGN PROSTATIC HYPERPLASIA WITHOUT LOWER URINARY TRACT SYMPTOMS: ICD-10-CM

## 2022-09-06 DIAGNOSIS — K57.90 DIVERTICULOSIS OF INTESTINE, PART UNSPECIFIED, WITHOUT PERFORATION OR ABSCESS WITHOUT BLEEDING: ICD-10-CM

## 2022-09-06 DIAGNOSIS — Z98.890 OTHER SPECIFIED POSTPROCEDURAL STATES: Chronic | ICD-10-CM

## 2022-09-06 DIAGNOSIS — I10 ESSENTIAL (PRIMARY) HYPERTENSION: ICD-10-CM

## 2022-09-06 DIAGNOSIS — Z90.89 ACQUIRED ABSENCE OF OTHER ORGANS: Chronic | ICD-10-CM

## 2022-09-06 DIAGNOSIS — Z90.49 ACQUIRED ABSENCE OF OTHER SPECIFIED PARTS OF DIGESTIVE TRACT: ICD-10-CM

## 2022-09-06 DIAGNOSIS — Z87.891 PERSONAL HISTORY OF NICOTINE DEPENDENCE: ICD-10-CM

## 2022-09-06 PROCEDURE — 99284 EMERGENCY DEPT VISIT MOD MDM: CPT

## 2022-09-06 RX ORDER — MULTIVIT WITH MIN/MFOLATE/K2 340-15/3 G
1 POWDER (GRAM) ORAL ONCE
Refills: 0 | Status: DISCONTINUED | OUTPATIENT
Start: 2022-09-06 | End: 2022-09-06

## 2022-09-06 NOTE — ED PROVIDER NOTE - PATIENT PORTAL LINK FT
You can access the FollowMyHealth Patient Portal offered by [facility] by registering at the following website: [email or web address]. By joining [employer]’s FollowMyHealth portal, you will also be able to view your health information using other applications (apps) compatible with our system.

## 2022-09-06 NOTE — ED ADULT NURSE NOTE - OBJECTIVE STATEMENT
Presents for c/o constipation with "weeks" of incomplete bowel emptying, reports generalized unwell feeling with weakness and pain. Has been here for similar problem before without relief, reports GI appt "after 9/7" tomorrow. Denies CP/SOB/weakness/dizziness/tingling/cough/fevers/known sick contacts.    On assessment- AOx4, breathing even and unlabored on RA, no apparent distress, VSS in triage, able to speak in clear coherent sentences, steady gait unassisted, neuro intact with no apparent facial asymmetry, PERRLA. NO apparent abd distention or deformity.

## 2022-09-06 NOTE — ED PROVIDER NOTE - PHYSICAL EXAMINATION
CONSTITUTIONAL: frail, cachetic male; in no apparent distress.   	HEAD: atraumatic.   	EYES:  clear  	ENT: airway patent  	GI: Soft; non-distended; non-tender. no guarding or rebound  Rectal exam performed, no stool in vault.   	EXT: BILLY x 4. ambulatory.   	SKIN: Normal for age and race; warm; dry; good turgor; no apparent lesions or rash.   	NEURO: A & O x 3; face symmetric; grossly unremarkable.   PSYCHOLOGICAL: The patient’s mood and manner are appropriate.

## 2022-09-06 NOTE — ED ADULT TRIAGE NOTE - CHIEF COMPLAINT QUOTE
c/o constipation x "several weeks"
BPH (benign prostatic hyperplasia)    BPH (benign prostatic hypertrophy)    CKD (chronic kidney disease), stage III    GERD (gastroesophageal reflux disease)    GERD (gastroesophageal reflux disease)    Gout    Gout    HTN (hypertension)    Hypercholesteremia    Hypercholesterolemia    Hypertension    Kidney disease    Lung abnormality    Nephritis    Solitary fibrous tumor

## 2022-09-06 NOTE — ED PROVIDER NOTE - NS ED ATTENDING NAME FT
Called Korina to schedule follow up  And she asked to have someone speak Amharic  Bijan Mcknight called patient and have gaver her the the appointment for 9/17 @ 1:45 in the [facility] office 
Gregory Bowman
As above

## 2022-09-06 NOTE — ED PROVIDER NOTE - CLINICAL SUMMARY MEDICAL DECISION MAKING FREE TEXT BOX
chronic constipation in elderly male, looks well, last BM yesterday, no vomiting/fever. nondistended/nontender, soft abdomen. no stool in rectal vault on exam. looks well. taking miralax and enemas at home. had CT abdomen 3 days ago that showed increased stool burden. do not suspect bowel obstruction. he has GI appointment this week. advised high fiber diet, confirmed that mag citrate is recalled, offered alternative medications but patient was not interested, patient advised to f/u GI, return precautions discussed.

## 2022-09-06 NOTE — ED PROVIDER NOTE - NSFOLLOWUPINSTRUCTIONS_ED_ALL_ED_FT
Constipation    Constipation is when a person has fewer than three bowel movements a week, has difficulty having a bowel movement, or has stools that are dry, hard, or larger than normal. Other symptoms can include abdominal pain or bloating. As people grow older, constipation is more common. A low-fiber diet, not taking in enough fluids, and taking certain medicines, including opioid painkillers, may make constipation worse. Treatment varies but may include dietary modifications (more fiber-rich foods), lifestyle modifications, and possible medications.     Follow up with gastroenterology    SEEK IMMEDIATE MEDICAL CARE IF YOU HAVE ANY OF THE FOLLOWING SYMPTOMS: bright red blood in your stool, constipation for longer than 4 days, abdominal or rectal pain, unexplained weight loss, or inability to pass gas.

## 2022-09-06 NOTE — ED PROVIDER NOTE - OBJECTIVE STATEMENT
82 yo male with hx IBS, chronic constipation, BPH, HTN presents c/o feeling constipated -- states he feels like he cant completely empty his colon. had two small bowel movements yesterday, soft, nonbloody. has been using enemas and miralax and drinking half cup of prune juice lately. denies fever/vomiting/abd distention. has some lower abdominal pain that resolves with passing BM. has GI appointment this week. last colonoscopy "long time ago" , normal as per patient. had CT. had normal labs and CT abdomen that showed above average stool burden. he inquired about mag citrate and wanted me to confirm it was recalled, does not want go lytely.

## 2022-09-07 DIAGNOSIS — R10.30 LOWER ABDOMINAL PAIN, UNSPECIFIED: ICD-10-CM

## 2022-09-07 DIAGNOSIS — Z91.013 ALLERGY TO SEAFOOD: ICD-10-CM

## 2022-09-07 DIAGNOSIS — K59.00 CONSTIPATION, UNSPECIFIED: ICD-10-CM

## 2022-09-07 DIAGNOSIS — I10 ESSENTIAL (PRIMARY) HYPERTENSION: ICD-10-CM

## 2022-09-07 DIAGNOSIS — N40.0 BENIGN PROSTATIC HYPERPLASIA WITHOUT LOWER URINARY TRACT SYMPTOMS: ICD-10-CM

## 2022-09-07 DIAGNOSIS — K52.9 NONINFECTIVE GASTROENTERITIS AND COLITIS, UNSPECIFIED: ICD-10-CM

## 2022-12-14 NOTE — ED ADULT NURSE NOTE - CAS TRG GEN SKIN COLOR
Called pt no answer left voice message to r/s her apt for 12/16/22 scheduled for medicare wellness [place]. Patient cancelled her apt on patient prompt.  
Normal for race

## 2022-12-30 NOTE — ED ADULT TRIAGE NOTE - DOMESTIC TRAVEL HIGH RISK QUESTION
CHARISSA for pt PCP medical records at [facility] to make aware that we have not received results. JNelsonINGA  
No
mother

## 2023-01-23 ENCOUNTER — EMERGENCY (EMERGENCY)
Facility: HOSPITAL | Age: 83
LOS: 1 days | Discharge: LEFT BEFORE DISCHARGE | End: 2023-01-23
Admitting: EMERGENCY MEDICINE
Payer: MEDICARE

## 2023-01-23 VITALS
TEMPERATURE: 98 F | RESPIRATION RATE: 16 BRPM | HEART RATE: 87 BPM | OXYGEN SATURATION: 98 % | SYSTOLIC BLOOD PRESSURE: 137 MMHG | DIASTOLIC BLOOD PRESSURE: 78 MMHG | WEIGHT: 145.06 LBS | HEIGHT: 74 IN

## 2023-01-23 DIAGNOSIS — Z90.49 ACQUIRED ABSENCE OF OTHER SPECIFIED PARTS OF DIGESTIVE TRACT: Chronic | ICD-10-CM

## 2023-01-23 DIAGNOSIS — Z90.89 ACQUIRED ABSENCE OF OTHER ORGANS: Chronic | ICD-10-CM

## 2023-01-23 DIAGNOSIS — Z98.890 OTHER SPECIFIED POSTPROCEDURAL STATES: Chronic | ICD-10-CM

## 2023-01-23 PROCEDURE — L9991: CPT

## 2023-01-23 NOTE — ED ADULT TRIAGE NOTE - CHIEF COMPLAINT QUOTE
PT with complaint sore throat stating "It's hard to swallow or eat." Throat examined at triage, no swelling noted. Pt speaking in full sentences. Pt reports history of IBS and IBS-C.

## 2023-01-24 DIAGNOSIS — Z53.21 PROCEDURE AND TREATMENT NOT CARRIED OUT DUE TO PATIENT LEAVING PRIOR TO BEING SEEN BY HEALTH CARE PROVIDER: ICD-10-CM

## 2023-01-24 DIAGNOSIS — J02.9 ACUTE PHARYNGITIS, UNSPECIFIED: ICD-10-CM

## 2023-01-28 NOTE — ED ADULT NURSE NOTE - NSFALLRSKASSESASSIST_ED_ALL_ED
Lab Results   Component Value Date    HGBA1C 7 2 (H) 12/12/2022       Recent Labs     01/27/23  1607 01/27/23  2115 01/28/23  0754 01/28/23  1131   POCGLU 196* 190* 165* 196*       Blood Sugar Average: Last 72 hrs:  (P) 189     Resume home regimen of saxagliptin at discharge no

## 2023-03-05 ENCOUNTER — EMERGENCY (EMERGENCY)
Facility: HOSPITAL | Age: 83
LOS: 1 days | Discharge: HOME | End: 2023-03-05
Admitting: EMERGENCY MEDICINE
Payer: MEDICARE

## 2023-03-05 VITALS
HEART RATE: 69 BPM | RESPIRATION RATE: 20 BRPM | TEMPERATURE: 97 F | DIASTOLIC BLOOD PRESSURE: 87 MMHG | OXYGEN SATURATION: 98 % | HEIGHT: 70 IN | WEIGHT: 134.92 LBS | SYSTOLIC BLOOD PRESSURE: 128 MMHG

## 2023-03-05 DIAGNOSIS — Z90.89 ACQUIRED ABSENCE OF OTHER ORGANS: Chronic | ICD-10-CM

## 2023-03-05 DIAGNOSIS — Z98.890 OTHER SPECIFIED POSTPROCEDURAL STATES: Chronic | ICD-10-CM

## 2023-03-05 DIAGNOSIS — Z90.49 ACQUIRED ABSENCE OF OTHER SPECIFIED PARTS OF DIGESTIVE TRACT: Chronic | ICD-10-CM

## 2023-03-05 PROCEDURE — 99284 EMERGENCY DEPT VISIT MOD MDM: CPT

## 2023-03-05 RX ORDER — SODIUM CHLORIDE 9 MG/ML
1000 INJECTION INTRAMUSCULAR; INTRAVENOUS; SUBCUTANEOUS ONCE
Refills: 0 | Status: DISCONTINUED | OUTPATIENT
Start: 2023-03-05 | End: 2023-03-08

## 2023-03-05 NOTE — ED PROVIDER NOTE - CARE PROVIDER_API CALL
[facility],   [address], [place] [address]    Phone: (   )    -  Fax: (   )    -  Follow Up Time:

## 2023-03-05 NOTE — ED ADULT NURSE NOTE - NSFALLRSKASSESSTYPE_ED_ALL_ED
Health Maintenance Due   Topic Date Due   • Hepatitis C Screening  12/07/2010       Patient would like to discuss with provider.           Initial (On Arrival)

## 2023-03-05 NOTE — ED PROVIDER NOTE - PATIENT PORTAL LINK FT
You can access the FollowMyHealth Patient Portal offered by [facility] by registering at the following website: [email or web address]. By joining [employer]’s FollowMyHealth portal, you will also be able to view your health information using other applications (apps) compatible with our system.

## 2023-03-05 NOTE — ED ADULT NURSE NOTE - SUICIDE SCREENING QUESTION 3
Call to patient who requested I speak with his wife. Wife denies any complaints related to anticoagulation therapy at this time. Wife reports no change in medication, diet or health. Reinforced with wife to call clinic with any medication changes as this can impact INR. Reinforced signs and symptoms bleeding/clotting with wife. Wife aware to seek medical care if signs and symptoms develop. Advised that if patient falls and/or hits their head, they should seek medical attention. Wife verbalizes understanding.     Dosing instructions given to wife verbally over the phone. Advised to call the clinic with any questions or concerns. Wife verbalizes understanding. Has clinic number.    Anticoagulation Summary  As of 2020    INR goal:   2.0-3.0   TTR:   44.9 % (5.1 mo)   INR used for dosin.8! (2020)   Warfarin maintenance plan:   2 mg (2 mg x 1) every M, W, Sa; 3 mg (2 mg x 1.5) all other days   Weekly warfarin total:   18 mg   Plan last modified:   Mago Tim RN (2020)   Next INR check:   2020   Priority:   Follow-Up - 2 Weeks   Target end date:       Indications    Encounter for therapeutic drug monitoring [Z51.81]  Long term (current) use of anticoagulants [Z79.01]  S/P AVR (aortic valve replacement) [Z95.2]  Longstanding persistent atrial fibrillation [I48.11]             Anticoagulation Episode Summary     INR check location:       Preferred lab:       Send INR reminders to:   ANTICOAG (OPEN ENROLLMENT) ACS CARD/EP    Comments:   *Next STAC due 20; COAGUCHEK; 2 mg tabs; 19 AVR-Tissue; Modified Maze procedure w Ligation L Atrial Appendage      Anticoagulation Care Providers     Provider Role Specialty Phone number    Daniela Mix MD Responsible Internal Medicine - Clinical Cardiac Electrophysiology [phone]    Shalini Hernandez NP Responsible Nurse Practitioner [phone]          Supervising provider: STEW Still     Patient refused

## 2023-03-05 NOTE — ED ADULT TRIAGE NOTE - CHIEF COMPLAINT QUOTE
Pt walked into ER c/o IBS with loose BM since last night. Pt also requesting HTN meds at triage. Pt denies N/V, fever/chills, or further at triage.

## 2023-03-05 NOTE — ED PROVIDER NOTE - CLINICAL SUMMARY MEDICAL DECISION MAKING FREE TEXT BOX
PMHx IBS-c, BPH, HTN, periformis syndrome presents complaining of a broken denture. well appearing on exam, advised follow up with dentist or [facility] dental.

## 2023-03-05 NOTE — ED PROVIDER NOTE - PROVIDER TOKENS
FREE:[LAST:[[facility]],PHONE:[(   )    -],FAX:[(   )    -],ADDRESS:[[facility] [place] [address] [phone]]]

## 2023-03-05 NOTE — ED PROVIDER NOTE - OBJECTIVE STATEMENT
PMHX HTN on enalapril, BPH, periformis syndrome, diverticulitis, IBS-C states that he gets alternating bouts of constipation and diarrhea daily states that he is in crisis because his denture has broken.

## 2023-03-07 ENCOUNTER — INPATIENT (INPATIENT)
Facility: HOSPITAL | Age: 83
LOS: 1 days | Discharge: LEFT BEFORE DISCHARGE | DRG: 312 | End: 2023-03-09
Attending: INTERNAL MEDICINE | Admitting: INTERNAL MEDICINE
Payer: MEDICARE

## 2023-03-07 VITALS
DIASTOLIC BLOOD PRESSURE: 87 MMHG | OXYGEN SATURATION: 98 % | HEART RATE: 87 BPM | SYSTOLIC BLOOD PRESSURE: 133 MMHG | WEIGHT: 139.99 LBS | HEIGHT: 70 IN | RESPIRATION RATE: 18 BRPM | TEMPERATURE: 98 F
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
## 2023-03-07 DIAGNOSIS — N40.0 BENIGN PROSTATIC HYPERPLASIA WITHOUT LOWER URINARY TRACT SYMPTOMS: ICD-10-CM

## 2023-03-07 DIAGNOSIS — Z90.49 ACQUIRED ABSENCE OF OTHER SPECIFIED PARTS OF DIGESTIVE TRACT: Chronic | ICD-10-CM
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
## 2023-03-07 DIAGNOSIS — Z90.89 ACQUIRED ABSENCE OF OTHER ORGANS: Chronic | ICD-10-CM

## 2023-03-07 DIAGNOSIS — Z91.013 ALLERGY TO SEAFOOD: ICD-10-CM

## 2023-03-07 DIAGNOSIS — I10 ESSENTIAL (PRIMARY) HYPERTENSION: ICD-10-CM

## 2023-03-07 DIAGNOSIS — S02.5XXA FRACTURE OF TOOTH (TRAUMATIC), INITIAL ENCOUNTER FOR CLOSED FRACTURE: ICD-10-CM

## 2023-03-07 DIAGNOSIS — R19.7 DIARRHEA, UNSPECIFIED: ICD-10-CM

## 2023-03-07 DIAGNOSIS — K52.9 NONINFECTIVE GASTROENTERITIS AND COLITIS, UNSPECIFIED: ICD-10-CM

## 2023-03-07 DIAGNOSIS — Z98.890 OTHER SPECIFIED POSTPROCEDURAL STATES: Chronic | ICD-10-CM

## 2023-03-07 LAB
ALBUMIN SERPL ELPH-MCNC: 4 G/DL — SIGNIFICANT CHANGE UP (ref 3.4–5)
ALP SERPL-CCNC: 90 U/L — SIGNIFICANT CHANGE UP (ref 40–120)
ALT FLD-CCNC: 23 U/L — SIGNIFICANT CHANGE UP (ref 12–42)
ANION GAP SERPL CALC-SCNC: 10 MMOL/L — SIGNIFICANT CHANGE UP (ref 9–16)
APPEARANCE UR: ABNORMAL
APTT BLD: 27.4 SEC — LOW (ref 27.5–35.5)
AST SERPL-CCNC: 30 U/L — SIGNIFICANT CHANGE UP (ref 15–37)
BASOPHILS # BLD AUTO: 0.03 K/UL — SIGNIFICANT CHANGE UP (ref 0–0.2)
BASOPHILS NFR BLD AUTO: 0.2 % — SIGNIFICANT CHANGE UP (ref 0–2)
BILIRUB SERPL-MCNC: 1.4 MG/DL — HIGH (ref 0.2–1.2)
BILIRUB UR-MCNC: ABNORMAL
BUN SERPL-MCNC: 45 MG/DL — HIGH (ref 7–23)
CALCIUM SERPL-MCNC: 10 MG/DL — SIGNIFICANT CHANGE UP (ref 8.5–10.5)
CHLORIDE SERPL-SCNC: 101 MMOL/L — SIGNIFICANT CHANGE UP (ref 96–108)
CO2 SERPL-SCNC: 26 MMOL/L — SIGNIFICANT CHANGE UP (ref 22–31)
COLOR SPEC: ABNORMAL
CREAT SERPL-MCNC: 1.63 MG/DL — HIGH (ref 0.5–1.3)
DIFF PNL FLD: NEGATIVE — SIGNIFICANT CHANGE UP
EGFR: 42 ML/MIN/1.73M2 — LOW
EOSINOPHIL # BLD AUTO: 0.06 K/UL — SIGNIFICANT CHANGE UP (ref 0–0.5)
EOSINOPHIL NFR BLD AUTO: 0.5 % — SIGNIFICANT CHANGE UP (ref 0–6)
FLUAV AG NPH QL: SIGNIFICANT CHANGE UP
FLUBV AG NPH QL: SIGNIFICANT CHANGE UP
GLUCOSE SERPL-MCNC: 133 MG/DL — HIGH (ref 70–99)
GLUCOSE UR QL: NEGATIVE — SIGNIFICANT CHANGE UP
HCT VFR BLD CALC: 42.2 % — SIGNIFICANT CHANGE UP (ref 39–50)
HGB BLD-MCNC: 14.3 G/DL — SIGNIFICANT CHANGE UP (ref 13–17)
IMM GRANULOCYTES NFR BLD AUTO: 0.7 % — SIGNIFICANT CHANGE UP (ref 0–0.9)
INR BLD: 1.05 — SIGNIFICANT CHANGE UP (ref 0.88–1.16)
KETONES UR-MCNC: ABNORMAL MG/DL
LEUKOCYTE ESTERASE UR-ACNC: NEGATIVE — SIGNIFICANT CHANGE UP
LIDOCAIN IGE QN: 46 U/L — LOW (ref 73–393)
LYMPHOCYTES # BLD AUTO: 17.2 % — SIGNIFICANT CHANGE UP (ref 13–44)
LYMPHOCYTES # BLD AUTO: 2.07 K/UL — SIGNIFICANT CHANGE UP (ref 1–3.3)
MCHC RBC-ENTMCNC: 33.9 GM/DL — SIGNIFICANT CHANGE UP (ref 32–36)
MCHC RBC-ENTMCNC: 34.2 PG — HIGH (ref 27–34)
MCV RBC AUTO: 101 FL — HIGH (ref 80–100)
MONOCYTES # BLD AUTO: 0.57 K/UL — SIGNIFICANT CHANGE UP (ref 0–0.9)
MONOCYTES NFR BLD AUTO: 4.7 % — SIGNIFICANT CHANGE UP (ref 2–14)
NEUTROPHILS # BLD AUTO: 9.25 K/UL — HIGH (ref 1.8–7.4)
NEUTROPHILS NFR BLD AUTO: 76.7 % — SIGNIFICANT CHANGE UP (ref 43–77)
NITRITE UR-MCNC: POSITIVE
NRBC # BLD: 0 /100 WBCS — SIGNIFICANT CHANGE UP (ref 0–0)
PH UR: 5 — SIGNIFICANT CHANGE UP (ref 5–8)
PLATELET # BLD AUTO: 232 K/UL — SIGNIFICANT CHANGE UP (ref 150–400)
POTASSIUM SERPL-MCNC: 4.8 MMOL/L — SIGNIFICANT CHANGE UP (ref 3.5–5.3)
POTASSIUM SERPL-SCNC: 4.8 MMOL/L — SIGNIFICANT CHANGE UP (ref 3.5–5.3)
PROT SERPL-MCNC: 7.7 G/DL — SIGNIFICANT CHANGE UP (ref 6.4–8.2)
PROT UR-MCNC: ABNORMAL MG/DL
PROTHROM AB SERPL-ACNC: 12.2 SEC — SIGNIFICANT CHANGE UP (ref 10.5–13.4)
RBC # BLD: 4.18 M/UL — LOW (ref 4.2–5.8)
RBC # FLD: 13.3 % — SIGNIFICANT CHANGE UP (ref 10.3–14.5)
RSV RNA NPH QL NAA+NON-PROBE: SIGNIFICANT CHANGE UP
SARS-COV-2 RNA SPEC QL NAA+PROBE: SIGNIFICANT CHANGE UP
SODIUM SERPL-SCNC: 137 MMOL/L — SIGNIFICANT CHANGE UP (ref 132–145)
SP GR SPEC: >=1.03 — SIGNIFICANT CHANGE UP (ref 1–1.03)
TROPONIN I, HIGH SENSITIVITY RESULT: <4 NG/L — SIGNIFICANT CHANGE UP
UROBILINOGEN FLD QL: 1 E.U./DL — SIGNIFICANT CHANGE UP
WBC # BLD: 12.06 K/UL — HIGH (ref 3.8–10.5)
WBC # FLD AUTO: 12.06 K/UL — HIGH (ref 3.8–10.5)

## 2023-03-07 PROCEDURE — 70450 CT HEAD/BRAIN W/O DYE: CPT | Mod: 26

## 2023-03-07 PROCEDURE — 71260 CT THORAX DX C+: CPT | Mod: 26

## 2023-03-07 PROCEDURE — 72125 CT NECK SPINE W/O DYE: CPT | Mod: 26

## 2023-03-07 PROCEDURE — 74177 CT ABD & PELVIS W/CONTRAST: CPT | Mod: 26

## 2023-03-07 PROCEDURE — 99285 EMERGENCY DEPT VISIT HI MDM: CPT

## 2023-03-07 RX ORDER — FINASTERIDE 5 MG/1
1 TABLET, FILM COATED ORAL
Qty: 0 | Refills: 0 | DISCHARGE

## 2023-03-07 RX ORDER — HYDRALAZINE HCL 50 MG
5 TABLET ORAL ONCE
Refills: 0 | Status: COMPLETED | OUTPATIENT
Start: 2023-03-07 | End: 2023-03-07

## 2023-03-07 RX ORDER — FINASTERIDE 5 MG/1
5 TABLET, FILM COATED ORAL DAILY
Refills: 0 | Status: DISCONTINUED | OUTPATIENT
Start: 2023-03-07 | End: 2023-03-09

## 2023-03-07 RX ORDER — ACETAMINOPHEN 500 MG
650 TABLET ORAL ONCE
Refills: 0 | Status: COMPLETED | OUTPATIENT
Start: 2023-03-07 | End: 2023-03-07

## 2023-03-07 RX ORDER — TAMSULOSIN HYDROCHLORIDE 0.4 MG/1
0.4 CAPSULE ORAL AT BEDTIME
Refills: 0 | Status: DISCONTINUED | OUTPATIENT
Start: 2023-03-07 | End: 2023-03-09

## 2023-03-07 RX ORDER — SODIUM CHLORIDE 9 MG/ML
1000 INJECTION INTRAMUSCULAR; INTRAVENOUS; SUBCUTANEOUS
Refills: 0 | Status: DISCONTINUED | OUTPATIENT
Start: 2023-03-07 | End: 2023-03-08

## 2023-03-07 RX ORDER — HEPARIN SODIUM 5000 [USP'U]/ML
5000 INJECTION INTRAVENOUS; SUBCUTANEOUS EVERY 8 HOURS
Refills: 0 | Status: DISCONTINUED | OUTPATIENT
Start: 2023-03-08 | End: 2023-03-09

## 2023-03-07 RX ORDER — POLYETHYLENE GLYCOL 3350 17 G/17G
17 POWDER, FOR SOLUTION ORAL ONCE
Refills: 0 | Status: DISCONTINUED | OUTPATIENT
Start: 2023-03-07 | End: 2023-03-07

## 2023-03-07 RX ORDER — LANOLIN ALCOHOL/MO/W.PET/CERES
5 CREAM (GRAM) TOPICAL AT BEDTIME
Refills: 0 | Status: DISCONTINUED | OUTPATIENT
Start: 2023-03-07 | End: 2023-03-09

## 2023-03-07 RX ORDER — CEFTRIAXONE 500 MG/1
1000 INJECTION, POWDER, FOR SOLUTION INTRAMUSCULAR; INTRAVENOUS EVERY 24 HOURS
Refills: 0 | Status: DISCONTINUED | OUTPATIENT
Start: 2023-03-08 | End: 2023-03-08

## 2023-03-07 RX ORDER — CEFTRIAXONE 500 MG/1
1000 INJECTION, POWDER, FOR SOLUTION INTRAMUSCULAR; INTRAVENOUS ONCE
Refills: 0 | Status: COMPLETED | OUTPATIENT
Start: 2023-03-07 | End: 2023-03-07

## 2023-03-07 RX ORDER — TAMSULOSIN HYDROCHLORIDE 0.4 MG/1
1 CAPSULE ORAL
Qty: 0 | Refills: 0 | DISCHARGE

## 2023-03-07 RX ORDER — SODIUM CHLORIDE 9 MG/ML
500 INJECTION INTRAMUSCULAR; INTRAVENOUS; SUBCUTANEOUS ONCE
Refills: 0 | Status: DISCONTINUED | OUTPATIENT
Start: 2023-03-07 | End: 2023-03-09

## 2023-03-07 RX ORDER — ACETAMINOPHEN 500 MG
650 TABLET ORAL EVERY 6 HOURS
Refills: 0 | Status: DISCONTINUED | OUTPATIENT
Start: 2023-03-07 | End: 2023-03-09

## 2023-03-07 RX ADMIN — TAMSULOSIN HYDROCHLORIDE 0.4 MILLIGRAM(S): 0.4 CAPSULE ORAL at 22:38

## 2023-03-07 RX ADMIN — Medication 650 MILLIGRAM(S): at 09:04

## 2023-03-07 RX ADMIN — CEFTRIAXONE 100 MILLIGRAM(S): 500 INJECTION, POWDER, FOR SOLUTION INTRAMUSCULAR; INTRAVENOUS at 10:58

## 2023-03-07 RX ADMIN — Medication 5 MILLIGRAM(S): at 19:40

## 2023-03-07 RX ADMIN — CEFTRIAXONE 1000 MILLIGRAM(S): 500 INJECTION, POWDER, FOR SOLUTION INTRAMUSCULAR; INTRAVENOUS at 11:38

## 2023-03-07 RX ADMIN — Medication 650 MILLIGRAM(S): at 10:22

## 2023-03-07 RX ADMIN — SODIUM CHLORIDE 150 MILLILITER(S): 9 INJECTION INTRAMUSCULAR; INTRAVENOUS; SUBCUTANEOUS at 07:53

## 2023-03-07 NOTE — ED PROVIDER NOTE - CLINICAL SUMMARY MEDICAL DECISION MAKING FREE TEXT BOX
Celestine Marcos, PGY-3- 82 year old male with a pmhx of HTN, BPH, presenting for evaluation of fall x2 since midnight. Pt with lightheadedness/dizziness prior to fall. Unknown if LOC, but believes to have had headstrike. Able to get up and ambulate on own. Now with L side rib and L abd pain. Reporting confusion since last night, prior to fall, though pt is AOx3. Pt with poor PO intake since yesterday 2/2 broken dentures. Pt noted to be bradycardic on tele monitor. EKG with NSR 67 bpm, LAFB. No old EKGs to compare. Exam with tenderness to L thorax and L abdomen. Will eval for acute pathology - fx, ich, diverticulitis, dehydration, infection. Dispo pending work up - likely needs admission for further cardiac work up, PT eval.

## 2023-03-07 NOTE — ED PROVIDER NOTE - CARE PLAN
1 Principal Discharge DX:	Syncope  Secondary Diagnosis:	Bradycardia  Secondary Diagnosis:	Repeated falls  Secondary Diagnosis:	Acute UTI  Secondary Diagnosis:	Dehydration

## 2023-03-07 NOTE — ED ADULT NURSE REASSESSMENT NOTE - NS ED NURSE REASSESS COMMENT FT1
Pt updated on bed placement at [facility]. Currently resting comfortably awaiting transfer.

## 2023-03-07 NOTE — ED ADULT NURSE NOTE - CHIEF COMPLAINT QUOTE
states he had a syncopal episode last night and fell onto his left side, pt with sob and dizziness. Pt c/o pain to left kidney, Pt is A+Ox3 denies CP

## 2023-03-07 NOTE — ED PROVIDER NOTE - PROGRESS NOTE DETAILS
Celestine Marcos, PGY-3- updated patient on results of work up. No acute fx or ich identified. Pt with large amount of stool on CT. Will give Miralax. Pt with persistent bradycardia on monitor. Will admit for tele monitoring and echo. Reports does not have cards and never had echo. Pt UA just resulted and is noted to be Nitrite positive. Will give dose of Ceftriaxone and send UA for UCx. Lab called to add on. Patent provided consent to admission to [facility]. Accepted for cardiology admission to Dr. SAVITA Kahn.

## 2023-03-07 NOTE — ED ADULT NURSE NOTE - NSIMPLEMENTINTERV_GEN_ALL_ED
Implemented All Fall with Harm Risk Interventions:  Fallentimber to call system. Call bell, personal items and telephone within reach. Instruct patient to call for assistance. Room bathroom lighting operational. Non-slip footwear when patient is off stretcher. Physically safe environment: no spills, clutter or unnecessary equipment. Stretcher in lowest position, wheels locked, appropriate side rails in place. Provide visual cue, wrist band, yellow gown, etc. Monitor gait and stability. Monitor for mental status changes and reorient to person, place, and time. Review medications for side effects contributing to fall risk. Reinforce activity limits and safety measures with patient and family. Provide visual clues: red socks.

## 2023-03-07 NOTE — ED PROVIDER NOTE - DIFFERENTIAL DIAGNOSIS
DDx includes but is not limited to- syncope, anemia, dehydration, fracture, ich, infection, diverticulitis Differential Diagnosis

## 2023-03-07 NOTE — ED PROVIDER NOTE - PHYSICAL EXAMINATION
General: appears frail, AOx3  Skin: no rash, no pallor, ecchymosis suprapubic region   Head: normocephalic, atraumatic  Eyes: clear conjunctiva, EOMI, PERRL   ENMT: airway patent, no nasal discharge  Cardiovascular: normal rate, normal rhythm, S1/S2  Pulmonary: clear to auscultation bilaterally, no rales, rhonchi, or wheeze  Abdomen: soft, diffuse L side tenderness, no cva tenderness   Musculoskeletal: moving extremities well, no deformity, no pelvic instability, full ROM of b/l upper and lower extremities, nontender over extremities, tender over L thorax  Neuro: CN II-XII grossly intact, 5/5 strength extremities, normal speech  Psych: normal mood, normal affect

## 2023-03-07 NOTE — ED PROVIDER NOTE - RAPID ASSESSMENT
Patient presents status post syncopal episode from home patient appears dehydrated notes that he got new dentures and was not able to eat or drink yesterday.  Awaiting all electrolytes, labs, urine.  Will sign out to a.m. team.  Orthostatics ordered as well.  Otherwise hemodynamically stable nontoxic-appearing patient

## 2023-03-07 NOTE — ED ADULT NURSE REASSESSMENT NOTE - NS ED NURSE REASSESS COMMENT FT1
Report received from night RN. Pt resting comfortably. Pt bradycardic upon assessment. MD made aware.

## 2023-03-07 NOTE — H&P ADULT - NSHPOUTPATIENTPROVIDERS_GEN_ALL_CORE
Brother Scott Quiroz #[phone]; [phone]  Doctor Victorino Olivera (PCP) [phone]  Has OP urologist

## 2023-03-07 NOTE — H&P ADULT - NSICDXPASTMEDICALHX_GEN_ALL_CORE_FT
PAST MEDICAL HISTORY:  BPH (benign prostatic hyperplasia)     Diverticulosis     Hemorrhoids     Hypertension     IBD (inflammatory bowel disease)     Stage 3 chronic kidney disease

## 2023-03-07 NOTE — H&P ADULT - HISTORY OF PRESENT ILLNESS
AOx3, Full Code     82M with PMHx HTN, BPH, diverticulosis, Inflammatory Bowel Disease, Gout, CKD Stage III (GFR 42 Cr 1.63 baseline Cr unknown) who presented to [facility] ED 3/7/23 s/p fall vs. syncope. Patient felt dizzy and lightheaded and experienced a fall x 2 prior to midnight. He is unsure if he lost consciousness but believes he hit his head. He denies C/P, SOB, palpitations, N/V, paresthesias of upper or lower extremities, changes in speech, facial droop or numbness, weakness/paralysis of upper or lower extremities. Patient was able to get up on his own and has been ambulatory since falls. He currently reports left abdominal pain and left sided rib pain.      On arrival to triage VSS /87 HR 87 RR 18 Temp 97.5 F O2 sat 98% RA. 1st Troponin T high sensitivity negative and EKG showed NSR at 67 bpm with PACs, LAFB, no ischemic changes. Labs significant for WBC 12.06, Auto Neutrophil #9.25, Cr 1.63. RVP and Covid negative. U/A+ Nitrite, trace protein, +trace ketones, Bacteria, moderate hyaline casts. Non contrast Head CT negative for fracture or acute bleed. CT C/A/P negative for acute fractures, acute posttraumatic pathology, no PE. Non Contrast CT Cervical Spine:  No fracture. Multilevel degenerative disc disease with  spondylosis (please see report). Treatment given: Ceftriaxone 1 g IV x 1, Tylenol 650 mg PO x 1, Miralax 17 g PO x 1 and NS at 150 cc/hr. Patient is now transferred to [facility] for further management of fall vs. Syncope.

## 2023-03-07 NOTE — H&P ADULT - ASSESSMENT
82M with PMHx HTN, BPH, diverticulosis, Inflammatory Bowel Disease, Gout, CKD Stage III (GFR 42 Cr 1.63 baseline Cr unknown) who presented to [facility] ED 3/7/23 s/p fall vs. syncope.     #syncope  - Trop x1 negative  - OSH EKG: NSR 67 bpm with PACs, LAFB, no ischemic changes  - TTE ordered   - Last ischemic eval **  - orthostatics in AM     # UA   - UA (+) 3/7 s/p CTX 1g x1 ; likely contributing to syncope   - WBC 12 (neurtophils 9), Afebrile   - meds: continue CTX 1g x3 days (3/7-3/9) empirically until sensitivies result  - Follow up UCx     #HTN   - -200s upon arrival  s/p hydral 5IV x1     # CKD3  - Unknown baseline  - Today, BUN/Cr 43/1.63 s/p IVF @ [facility]  - avoid nephrotoxic agents     Mg<2, K<4  DVT PPX   Dash diet   Dispo: pending syncope w/up    82M with PMHx HTN, BPH, diverticulosis, Inflammatory Bowel Disease, Gout, CKD Stage III (GFR 42 Cr 1.63 baseline Cr unknown) who presented to [facility] ED 3/7/23 s/p fall vs. syncope.     #syncope  - Trop x1 negative  - OSH EKG: NSR 67 bpm with PACs, LAFB, no ischemic changes  - OSH CT imaging unremarkable   - Last ischemic eval unknown   - Monitor tele   - TTE ordered   - orthostatics in AM     # UA   - UA (+) 3/7 s/p CTX 1g x1 ; likely contributing to syncope   - WBC 12 neutrophils 9), Afebrile   - meds: continue CTX 1g x3 days (3/7-3/9) empirically until sensitives result  - Follow up UCx     #HTN   - -200s upon arrival  s/p hydral 5IV x1   - meds: holding home enalapril 5mg daily 2/2 CKD     # Hx CKD3  - Unknown baseline   - Today, BUN/Cr 43/1.63 s/p mIVF 150cc/hr @ [facility]  - avoid nephrotoxic agents - see above     # Hx BPH   meds: c/w home finasteride 5mg and tamsulosin 4 daily     # Hx IBS  - not currently on any home meds    Mg<2, K<4  DVT PPX   Dash diet /soft bite sized +dentures   Dispo: pending syncope w/up

## 2023-03-07 NOTE — H&P ADULT - NSHPREVIEWOFSYSTEMS_GEN_ALL_CORE
GENERAL, CONSTITUTIONAL : denies recent weight loss, fever, chills  EYES, VISION: denies changes in vision   EARS, NOSE, THROAT: denies hearing loss  HEART, CARDIOVASCULAR: denies chest pain, arrhythmia, palpitations,   RESPIRATORY: Denies cough, SOB, wheezing, PND, orthopnea  GASTROINTESTINAL: admits to left lower  abdominal pain 2/2 to syncope with fall, Admits to IBS more constipation ,denies heartburn, bloody stool, dark tarry stool   GENITOURINARY: Admits to  frequent urination, urgency and dysuria  MUSCULOSKELETAL admits  joint pain , musculoskeletal pain denies restricted motion   SKIN & INTEGUMENTARY Denies rashes, sores, blisters, blisters, growths.  NEUROLOGICAL: Denies numbness or tingling sensations, sensation loss, burning.   PSYCHIATRIC: Denies nervousness, anxiety, depression  ENDOCRINE Denies heat or cold intolerance, excessive thirst  HEMATOLOGIC/LYMPHATIC: Denies abnormal bleeding, bleeding of any kind

## 2023-03-07 NOTE — ED PROVIDER NOTE - ATTENDING CONTRIBUTION TO CARE
Pt is an 81yo M with a h/o HTN and BPH who p/w syncope x 2.  Pt reports feeling dehydrated and dizzy/light headed for the past 2 days.  Notes two episodes of fainting with LOC.  +Head injury.  Also reports L sided flank/abd pain.  Reports recently breaking his dentures and has had decreased PO intake since.  Noted bradycardia and pt denies any previous cardiac work up.   ROS - Denies neck pain, numbness, tingling, cp, sob, cough, vomiting, diarrhea, dysuria.   PE - agree with Resident exam as above.   A/P - Syncope x 2 in setting of dehydration and bradycardia.  We will admit for continuous cardiac tele monitoring, echo, and serial labs/ekgs.  IV hydration started.  UA also demonstrates concern for UTI. Will start Ceftriaxone and send culture.

## 2023-03-07 NOTE — H&P ADULT - NSHPPHYSICALEXAM_GEN_ALL_CORE
Gen: NAD, pleasant and conversant   Neuro: AOx3, nonfocal   HEENT: PERRLA, EOMI, normal oral mucosa  CV: RRR, +S1S2  Pulm: CTAB   GI: +BS, soft, NT/ND   Extremities: WWP, no edema   Skin: No rashes or trauma noted Gen: NAD, pleasant and conversant +cachectic/frail appearing    Neuro: AOx3, nonfocal   HEENT: PERRLA, EOMI, normal oral mucosa  CV: RRR, +S1S2  Pulm: CTAB   GI: +BS, soft, NT +mild LLQ pain   Extremities: WWP, no edema   Skin: No rashes or trauma noted 10-Aug-2022 19:00

## 2023-03-07 NOTE — PATIENT PROFILE ADULT - FALL HARM RISK - HARM RISK INTERVENTIONS
Assistance with ambulation/Assistance OOB with selected safe patient handling equipment/Communicate Risk of Fall with Harm to all staff/Discuss with provider need for PT consult/Monitor gait and stability/Reinforce activity limits and safety measures with patient and family/Sit up slowly, dangle for a short time, stand at bedside before walking/Tailored Fall Risk Interventions/Visual Cue: Yellow wristband and red socks/Bed in lowest position, wheels locked, appropriate side rails in place/Call bell, personal items and telephone in reach/Instruct patient to call for assistance before getting out of bed or chair/Non-slip footwear when patient is out of bed/[place] to call system/Physically safe environment - no spills, clutter or unnecessary equipment/Purposeful Proactive Rounding/Room/bathroom lighting operational, light cord in reach

## 2023-03-07 NOTE — H&P ADULT - NSHPLABSRESULTS_GEN_ALL_CORE
14.3   12.06 )-----------( 232      ( 07 Mar 2023 07:05 )             42.2       03-07    137  |  101  |  45<H>  ----------------------------<  133<H>  4.8   |  26  |  1.63<H>    Ca    10.0      07 Mar 2023 07:05    TPro  7.7  /  Alb  4.0  /  TBili  1.4<H>  /  DBili  x   /  AST  30  /  ALT  23  /  AlkPhos  90  03-07      PT/INR - ( 07 Mar 2023 07:05 )   PT: 12.2 sec;   INR: 1.05          PTT - ( 07 Mar 2023 07:05 )  PTT:27.4 sec      Urinalysis Basic - ( 07 Mar 2023 08:22 )    Color: Orange / Appearance: SL Cloudy / SG: >=1.030 / pH: x  Gluc: x / Ketone: Trace mg/dL  / Bili: Moderate / Urobili: 1.0 E.U./dL   Blood: x / Protein: Trace mg/dL / Nitrite: POSITIVE   Leuk Esterase: NEGATIVE / RBC: < 5 /HPF / WBC < 5 /HPF   Sq Epi: x / Non Sq Epi: 0-5 /HPF / Bacteria: Present /HPF

## 2023-03-07 NOTE — ED PROVIDER NOTE - OBJECTIVE STATEMENT
Celestine Marcos, PGY-3- 82 year old male with a pmhx of HTN, BPH, presents to ED for evaluation of syncope and fall. Pt reports 2 falls since midnight. Notes L side abd pain and L side rib pain. Felt dizzy and lightheaded prior to fall. Reports confusion since yesterday. Believes he hit his head. Denies LOC. Was able to get up on his own and was ambulatory since falls. Pt reports poor PO intake 2/2 broken denture. Reports no neck pain, numbness, tingling, cp, sob, cough, vomiting, diarrhea, dysuria. No AC/AP. NKDA. Reports HR is usually bradycardic. Ambulatory at baseline without assistive devices. Celestine Marcos, PGY-3- 82 year old male with a pmhx of HTN, BPH, presents to ED for evaluation of syncope and fall. Pt reports 2 falls since midnight. Notes L side abd pain and L side rib pain. Felt dizzy and lightheaded prior to fall. Reports confusion since yesterday. Believes he hit his head. Denies LOC. Was able to get up on his own and was ambulatory since falls. Pt reports poor PO intake 2/2 broken denture. Reports no neck pain, numbness, tingling, cp, sob, cough, vomiting, diarrhea, dysuria. No AC/AP. NKDA. Reports HR is usually bradycardic. Ambulatory at baseline without assistive devices. Meds - Enalapril and Finasteride. No beta or calcium channel blockers.

## 2023-03-07 NOTE — ED ADULT NURSE NOTE - OBJECTIVE STATEMENT
Pt presents to ED c/o fall, syncopal episode, SOB, and chronic dizziness. Pt states he has mentioned these complaints to his pmd but decided to come to the ED after the fall. C/o pain to LLQ and R hip. Pt unsure if he hit his head, denies AC. Pt ambulatory with steady gait. Denies CP or palpitations at this time. Pmhx of HTN and BPH.

## 2023-03-08 DIAGNOSIS — K52.9 NONINFECTIVE GASTROENTERITIS AND COLITIS, UNSPECIFIED: ICD-10-CM

## 2023-03-08 DIAGNOSIS — I10 ESSENTIAL (PRIMARY) HYPERTENSION: ICD-10-CM

## 2023-03-08 DIAGNOSIS — R55 SYNCOPE AND COLLAPSE: ICD-10-CM

## 2023-03-08 DIAGNOSIS — N40.0 BENIGN PROSTATIC HYPERPLASIA WITHOUT LOWER URINARY TRACT SYMPTOMS: ICD-10-CM

## 2023-03-08 DIAGNOSIS — I95.1 ORTHOSTATIC HYPOTENSION: ICD-10-CM

## 2023-03-08 DIAGNOSIS — Z29.9 ENCOUNTER FOR PROPHYLACTIC MEASURES, UNSPECIFIED: ICD-10-CM

## 2023-03-08 LAB
A1C WITH ESTIMATED AVERAGE GLUCOSE RESULT: 5.1 % — SIGNIFICANT CHANGE UP (ref 4–5.6)
ALBUMIN SERPL ELPH-MCNC: 3.4 G/DL — SIGNIFICANT CHANGE UP (ref 3.3–5)
ALP SERPL-CCNC: 82 U/L — SIGNIFICANT CHANGE UP (ref 40–120)
ALT FLD-CCNC: 9 U/L — LOW (ref 10–45)
ANION GAP SERPL CALC-SCNC: 12 MMOL/L — SIGNIFICANT CHANGE UP (ref 5–17)
AST SERPL-CCNC: 20 U/L — SIGNIFICANT CHANGE UP (ref 10–40)
BILIRUB DIRECT SERPL-MCNC: <0.2 MG/DL — SIGNIFICANT CHANGE UP (ref 0–0.3)
BILIRUB INDIRECT FLD-MCNC: SIGNIFICANT CHANGE UP MG/DL (ref 0.2–1)
BILIRUB SERPL-MCNC: 0.9 MG/DL — SIGNIFICANT CHANGE UP (ref 0.2–1.2)
BUN SERPL-MCNC: 34 MG/DL — HIGH (ref 7–23)
CALCIUM SERPL-MCNC: 9 MG/DL — SIGNIFICANT CHANGE UP (ref 8.4–10.5)
CHLORIDE SERPL-SCNC: 105 MMOL/L — SIGNIFICANT CHANGE UP (ref 96–108)
CHOLEST SERPL-MCNC: 185 MG/DL — SIGNIFICANT CHANGE UP
CO2 SERPL-SCNC: 19 MMOL/L — LOW (ref 22–31)
CREAT SERPL-MCNC: 0.92 MG/DL — SIGNIFICANT CHANGE UP (ref 0.5–1.3)
EGFR: 83 ML/MIN/1.73M2 — SIGNIFICANT CHANGE UP
ESTIMATED AVERAGE GLUCOSE: 100 MG/DL — SIGNIFICANT CHANGE UP (ref 68–114)
GLUCOSE SERPL-MCNC: 99 MG/DL — SIGNIFICANT CHANGE UP (ref 70–99)
HCT VFR BLD CALC: 38.8 % — LOW (ref 39–50)
HDLC SERPL-MCNC: 54 MG/DL — SIGNIFICANT CHANGE UP
HGB BLD-MCNC: 12.7 G/DL — LOW (ref 13–17)
LIPID PNL WITH DIRECT LDL SERPL: 119 MG/DL — HIGH
MAGNESIUM SERPL-MCNC: 2.1 MG/DL — SIGNIFICANT CHANGE UP (ref 1.6–2.6)
MCHC RBC-ENTMCNC: 32.7 GM/DL — SIGNIFICANT CHANGE UP (ref 32–36)
MCHC RBC-ENTMCNC: 34.4 PG — HIGH (ref 27–34)
MCV RBC AUTO: 105.1 FL — HIGH (ref 80–100)
NON HDL CHOLESTEROL: 131 MG/DL — HIGH
NRBC # BLD: 0 /100 WBCS — SIGNIFICANT CHANGE UP (ref 0–0)
PHOSPHATE SERPL-MCNC: 2.9 MG/DL — SIGNIFICANT CHANGE UP (ref 2.5–4.5)
PLATELET # BLD AUTO: 176 K/UL — SIGNIFICANT CHANGE UP (ref 150–400)
POTASSIUM SERPL-MCNC: 4.2 MMOL/L — SIGNIFICANT CHANGE UP (ref 3.5–5.3)
POTASSIUM SERPL-SCNC: 4.2 MMOL/L — SIGNIFICANT CHANGE UP (ref 3.5–5.3)
PROT SERPL-MCNC: 6.1 G/DL — SIGNIFICANT CHANGE UP (ref 6–8.3)
RBC # BLD: 3.69 M/UL — LOW (ref 4.2–5.8)
RBC # FLD: 13.1 % — SIGNIFICANT CHANGE UP (ref 10.3–14.5)
SODIUM SERPL-SCNC: 136 MMOL/L — SIGNIFICANT CHANGE UP (ref 135–145)
TRIGL SERPL-MCNC: 61 MG/DL — SIGNIFICANT CHANGE UP
WBC # BLD: 6.65 K/UL — SIGNIFICANT CHANGE UP (ref 3.8–10.5)
WBC # FLD AUTO: 6.65 K/UL — SIGNIFICANT CHANGE UP (ref 3.8–10.5)

## 2023-03-08 PROCEDURE — 93306 TTE W/DOPPLER COMPLETE: CPT | Mod: 26

## 2023-03-08 PROCEDURE — 93010 ELECTROCARDIOGRAM REPORT: CPT

## 2023-03-08 PROCEDURE — 99233 SBSQ HOSP IP/OBS HIGH 50: CPT

## 2023-03-08 RX ORDER — SODIUM CHLORIDE 9 MG/ML
1000 INJECTION INTRAMUSCULAR; INTRAVENOUS; SUBCUTANEOUS
Refills: 0 | Status: DISCONTINUED | OUTPATIENT
Start: 2023-03-08 | End: 2023-03-09

## 2023-03-08 RX ADMIN — Medication 650 MILLIGRAM(S): at 23:01

## 2023-03-08 RX ADMIN — Medication 5 MILLIGRAM(S): at 00:13

## 2023-03-08 RX ADMIN — SODIUM CHLORIDE 100 MILLILITER(S): 9 INJECTION INTRAMUSCULAR; INTRAVENOUS; SUBCUTANEOUS at 14:46

## 2023-03-08 RX ADMIN — FINASTERIDE 5 MILLIGRAM(S): 5 TABLET, FILM COATED ORAL at 07:09

## 2023-03-08 RX ADMIN — CEFTRIAXONE 100 MILLIGRAM(S): 500 INJECTION, POWDER, FOR SOLUTION INTRAMUSCULAR; INTRAVENOUS at 07:07

## 2023-03-08 RX ADMIN — SODIUM CHLORIDE 150 MILLILITER(S): 9 INJECTION INTRAMUSCULAR; INTRAVENOUS; SUBCUTANEOUS at 00:42

## 2023-03-08 RX ADMIN — TAMSULOSIN HYDROCHLORIDE 0.4 MILLIGRAM(S): 0.4 CAPSULE ORAL at 21:08

## 2023-03-08 RX ADMIN — HEPARIN SODIUM 5000 UNIT(S): 5000 INJECTION INTRAVENOUS; SUBCUTANEOUS at 07:07

## 2023-03-08 RX ADMIN — Medication 650 MILLIGRAM(S): at 01:13

## 2023-03-08 RX ADMIN — Medication 650 MILLIGRAM(S): at 00:13

## 2023-03-08 NOTE — DIETITIAN INITIAL EVALUATION ADULT - ETIOLOGY
RT inadequate energy intake 2/2 lack of appetite and suspected lack of nutrition knowledge on dietary modifications

## 2023-03-08 NOTE — PHYSICAL THERAPY INITIAL EVALUATION ADULT - GENERAL OBSERVATIONS, REHAB EVAL
Pt received semi supine in bed +hep lock +tele. PT received consent to treat from LIZZIE Degroot. Pt tolerated session fairly well, amb with no AD CGA negative orthostatics. Pt was left as received with call bell in reach, VSS, and in NAD.

## 2023-03-08 NOTE — DIETITIAN NUTRITION RISK NOTIFICATION - TREATMENT: THE FOLLOWING DIET HAS BEEN RECOMMENDED
Diet, DASH/TLC:   Sodium & Cholesterol Restricted  Soft and Bite Sized (SOFTBTSZ) (03-07-23 @ 20:30) [Active]       Home

## 2023-03-08 NOTE — DIETITIAN INITIAL EVALUATION ADULT - OTHER INFO
82M with PMHx HTN, BPH, diverticulosis, Inflammatory Bowel Disease, Gout, CKD Stage III (GFR 42 Cr 1.63 baseline Cr unknown) who presented to [facility] ED 3/7/23 s/p fall vs. syncope. Pt seen in room, awake, alert, pleasant, breathing comfortably on room air. Pt admits to poor appetite and intake x 1 year, and attributes this to IBS-C for which he changed his diet. Denied following with RD outpatient during this time. Subjective reported weight loss of 20# x 1yr; per chart review difficult to ascertain actual/UBW as reported weights in ER are fluctuating. Currently w/fair intake, <50% intake at dinner reported. No N/V/C/D reported at this time. BM 3/8. Has dentures that are currently not fitting well and is currently on soft & bite sized diet. Skin noted intact pressure-wise, no edema. Afebrile. Pain reported, team aware. High protein/high calorie nutrition reviewed, encouraged small, frequent meals. Encouraged oral nutrition supplementation. Believe that pt would benefit from appetite stimulant. Referred to outpatient RD. Will continue to follow per RD protocol.

## 2023-03-08 NOTE — DIETITIAN INITIAL EVALUATION ADULT - PERTINENT MEDS FT
MEDICATIONS  (STANDING):  finasteride 5 milliGRAM(s) Oral daily  heparin   Injectable 5000 Unit(s) SubCutaneous every 8 hours  sodium chloride 0.9% Bolus 500 milliLiter(s) IV Bolus once  sodium chloride 0.9%. 1000 milliLiter(s) (100 mL/Hr) IV Continuous <Continuous>  tamsulosin 0.4 milliGRAM(s) Oral at bedtime    MEDICATIONS  (PRN):  acetaminophen     Tablet .. 650 milliGRAM(s) Oral every 6 hours PRN Mild Pain (1 - 3)  melatonin 5 milliGRAM(s) Oral at bedtime PRN Insomnia   Yes

## 2023-03-08 NOTE — DIETITIAN INITIAL EVALUATION ADULT - OTHER CALCULATIONS
Stable. IBW used to estimate needs as pt is <80% of IBW (68% IBW). Needs estimated for age and adjusted for protein-calorie malnutrition

## 2023-03-08 NOTE — PHYSICAL THERAPY INITIAL EVALUATION ADULT - ADDITIONAL COMMENTS
Pt states he lives alone in 2nd floor walk up. Pt was independent with ADLs and amb PTA has had 2 falls in the past month.

## 2023-03-08 NOTE — DIETITIAN INITIAL EVALUATION ADULT - PERTINENT LABORATORY DATA
03-08    136  |  105  |  34<H>  ----------------------------<  99  4.2   |  19<L>  |  0.92    Ca    9.0      08 Mar 2023 05:30  Phos  2.9     03-08  Mg     2.1     03-08    TPro  6.1  /  Alb  3.4  /  TBili  0.9  /  DBili  <0.2  /  AST  20  /  ALT  9<L>  /  AlkPhos  82  03-08  A1C with Estimated Average Glucose Result: 5.1 % (03-08-23 @ 05:30)

## 2023-03-08 NOTE — PROGRESS NOTE ADULT - PROBLEM SELECTOR PLAN 1
Pt w/ hx of dizziness/lightheadedness on acute positional changes. Pt claims that this is worse at night after taking the flomax dose. Pt claims recent episode of fall and syncope was associated with recent flomax dose and quick positional changes from the bed. Troponins negative on admission  - OSH EKG: NSR 67 bpm with PACs, LAFB, no ischemic changes  - OSH CT imaging unremarkable   - Last ischemic eval unknown   - Monitor tele   - Per TTE evidence of aortic stenosis but unable to diff severity due to poor study.  - Orthostatics positive

## 2023-03-08 NOTE — DIETITIAN INITIAL EVALUATION ADULT - ADD RECOMMEND
1. Recommend Ensure Max BID (300kcal, 60g pro)  2. Recommend MVI w/minerals and remeron   3. Monitor lytes and replete prn. POC BG q6hrs   4. Pain and bowel regimens per team   *paged team, no response

## 2023-03-08 NOTE — DIETITIAN INITIAL EVALUATION ADULT - NS FNS DIET ORDER
Diet, DASH/TLC:   Sodium & Cholesterol Restricted  Soft and Bite Sized (SOFTBTSZ) (03-07-23 @ 20:30)

## 2023-03-08 NOTE — PROGRESS NOTE ADULT - ASSESSMENT
82M with PMHx HTN, BPH, diverticulosis, Inflammatory Bowel Disease, Gout, CKD Stage III (GFR 42 Cr 1.63 baseline Cr unknown) who presented to [facility] ED 3/7/23 s/p fall vs. syncope.               # Hx CKD3  - Unknown baseline   - Today, BUN/Cr 43/1.63 s/p mIVF 150cc/hr @ [facility]  - avoid nephrotoxic agents - see above     # Hx BPH   meds: c/w home finasteride 5mg and tamsulosin 4 daily     #     Mg<2, K<4  DVT PPX   Dash diet /soft bite sized +dentures   Dispo: pending syncope w/up      82M with PMHx HTN, BPH, diverticulosis, Inflammatory Bowel Disease, Gout, CKD Stage III (GFR 42 Cr 1.63 baseline Cr unknown) who presented to [facility] ED 3/7/23 s/p fall vs. syncope.

## 2023-03-08 NOTE — PROGRESS NOTE ADULT - ATTENDING COMMENTS
Patient seen and examined. Case discussed with resident team.     Pt described syncopal event at home, thinks he might have been dehydrated, had mechanical fall prior to that  transferred from [facility] to [facility] due to report of HR 40s on monitor at [facility]  EKG SR, PACs, LAFB  On exam NAD lungs clear cv sys murmur abd soft no BREANA  -TTE  -orthostatic VS + - gentle hydration pending TTE  -f/u urine cultures, unclear if symptomatic, further abx pending results  -tele monitoring, if unrevealing, will see if can be set up for extended outpt monitor with EP  -initial Cr increased now normalized  -trend hgb, likely dilutional dec  -PT jacinta Kahn MD

## 2023-03-08 NOTE — PROGRESS NOTE ADULT - SUBJECTIVE AND OBJECTIVE BOX
----- INTERVAL HPI/OVERNIGHT EVENTS -----     Patient was seen and examined at bedside. As per nurse and patient, no o/n events, patient resting comfortably. No complaints at this time. Patient denies: fever, chills, dizziness, weakness, HA, Changes in vision, CP, palpitations, SOB, cough, N/V/D/C, dysuria, changes in bowel movements, LE edema. ROS otherwise negative.      Subjective: Patient is a 82y old  Male who presents with a chief complaint of syncope (07 Mar 2023 20:19)      ----- VITAL SIGNS -----  T(F): 97.6 (03-08-23 @ 09:14)  HR: 58 (03-08-23 @ 09:00)  BP: 158/78 (03-08-23 @ 09:00)  RR: 18 (03-08-23 @ 09:00)  SpO2: 95% (03-08-23 @ 09:00)  Wt(kg): --      03-07-23 @ 07:01  -  03-08-23 @ 07:00  --------------------------------------------------------  IN: 570 mL / OUT: 150 mL / NET: 420 mL    03-08-23 @ 07:01  -  03-08-23 @ 12:03  --------------------------------------------------------  IN: 200 mL / OUT: 0 mL / NET: 200 mL        ----- Physical Exam -----     Constitutional: resting comfortably in bed; NAD  HEENT: NC/AT, PERRL, EOMI, anicteric sclera, no nasal discharge; uvula midline, no oropharyngeal erythema or exudates, MMM; no thyromegaly or anterior/posterior or submental JUNIE; neck supple  Respiratory: CTA B/L; no W/R/R, no retractions  Cardiac: +S1/S2; RRR; no M/R/G appreciated  Gastrointestinal: abdomen soft, NT/ND, +BS, no rebound tenderness or guarding  Back: no CVAT B/L  Extremities: legs WWP, no clubbing or cyanosis; no peripheral edema  Vascular: 2+ distal pedal pulses B/L  Dermatologic: skin warm, dry and intact; no rashes, wounds, or scars  Neurologic: AAOx3; CNII-XII grossly intact; strength 5/5 and sensation intact in all 4 extremities; no focal deficits  Psychiatric: affect and characteristics of appearance, verbalizations, behaviors are appropriate    MEDICATIONS  (STANDING):  finasteride 5 milliGRAM(s) Oral daily  heparin   Injectable 5000 Unit(s) SubCutaneous every 8 hours  sodium chloride 0.9% Bolus 500 milliLiter(s) IV Bolus once  sodium chloride 0.9%. 1000 milliLiter(s) (150 mL/Hr) IV Continuous <Continuous>  tamsulosin 0.4 milliGRAM(s) Oral at bedtime    MEDICATIONS  (PRN):  acetaminophen     Tablet .. 650 milliGRAM(s) Oral every 6 hours PRN Mild Pain (1 - 3)  melatonin 5 milliGRAM(s) Oral at bedtime PRN Insomnia      Allergies    No Known Drug Allergies  Shrimp (Unknown)    Intolerances        ----- LABS -----                         12.7   6.65  )-----------( 176      ( 08 Mar 2023 05:30 )             38.8     03-08    136  |  105  |  34<H>  ----------------------------<  99  4.2   |  19<L>  |  0.92    Ca    9.0      08 Mar 2023 05:30  Phos  2.9     03-08  Mg     2.1     03-08    TPro  6.1  /  Alb  3.4  /  TBili  0.9  /  DBili  <0.2  /  AST  20  /  ALT  9<L>  /  AlkPhos  82  03-08    PT/INR - ( 07 Mar 2023 07:05 )   PT: 12.2 sec;   INR: 1.05          PTT - ( 07 Mar 2023 07:05 )  PTT:27.4 sec  Urinalysis Basic - ( 07 Mar 2023 08:22 )    Color: Orange / Appearance: SL Cloudy / SG: >=1.030 / pH: x  Gluc: x / Ketone: Trace mg/dL  / Bili: Moderate / Urobili: 1.0 E.U./dL   Blood: x / Protein: Trace mg/dL / Nitrite: POSITIVE   Leuk Esterase: NEGATIVE / RBC: < 5 /HPF / WBC < 5 /HPF   Sq Epi: x / Non Sq Epi: 0-5 /HPF / Bacteria: Present /HPF          ----- RADIOLOGY & ADDITIONAL TESTS -----     Reviewed ----- INTERVAL HPI/OVERNIGHT EVENTS -----     Patient was seen and examined at bedside. As per nurse and patient, no o/n events, patient resting comfortably. No complaints at this time. Patient denies: fever, chills, dizziness, weakness, HA, Changes in vision, CP, palpitations, SOB, cough, N/V/D/C, dysuria, changes in bowel movements, LE edema. ROS otherwise negative.  Pt claims to have had adequate urination and BM in the morning. He endorsed once again that episodes of dizziness and lightheadedness are directly associated with quick positional changes.       Subjective: Patient is a 82y old  Male who presents with a chief complaint of syncope (07 Mar 2023 20:19)      ----- VITAL SIGNS -----  T(F): 97.6 (03-08-23 @ 09:14)  HR: 58 (03-08-23 @ 09:00)  BP: 158/78 (03-08-23 @ 09:00)  RR: 18 (03-08-23 @ 09:00)  SpO2: 95% (03-08-23 @ 09:00)  Wt(kg): --      03-07-23 @ 07:01  -  03-08-23 @ 07:00  --------------------------------------------------------  IN: 570 mL / OUT: 150 mL / NET: 420 mL    03-08-23 @ 07:01  -  03-08-23 @ 12:03  --------------------------------------------------------  IN: 200 mL / OUT: 0 mL / NET: 200 mL        ----- Physical Exam -----     Constitutional: resting comfortably in bed; NAD  HEENT: anicteric sclera, no nasal discharge; uvula midline, no oropharyngeal erythema or exudates, MMM  Respiratory: CTA B/L; no W/R/R, no retractions  Cardiac: +S1/S2; RRR; no M/R/G appreciated  Gastrointestinal: abdomen soft, NT/ND, +BS, no rebound tenderness or guarding  Back: no CVAT B/L  Extremities: legs WWP, no clubbing or cyanosis; no peripheral edema  Vascular: 2+ distal pedal pulses B/L  Dermatologic: skin warm, dry and intact; no rashes, wounds, or scars  Neurologic: AAOx3; CNII-XII grossly intact; strength 5/5 and sensation intact in all 4 extremities; no focal deficits  Psychiatric: affect and characteristics of appearance, verbalizations, behaviors are appropriate    MEDICATIONS  (STANDING):  finasteride 5 milliGRAM(s) Oral daily  heparin   Injectable 5000 Unit(s) SubCutaneous every 8 hours  sodium chloride 0.9% Bolus 500 milliLiter(s) IV Bolus once  sodium chloride 0.9%. 1000 milliLiter(s) (150 mL/Hr) IV Continuous <Continuous>  tamsulosin 0.4 milliGRAM(s) Oral at bedtime    MEDICATIONS  (PRN):  acetaminophen     Tablet .. 650 milliGRAM(s) Oral every 6 hours PRN Mild Pain (1 - 3)  melatonin 5 milliGRAM(s) Oral at bedtime PRN Insomnia      Allergies    No Known Drug Allergies  Shrimp (Unknown)    Intolerances        ----- LABS -----                         12.7   6.65  )-----------( 176      ( 08 Mar 2023 05:30 )             38.8     03-08    136  |  105  |  34<H>  ----------------------------<  99  4.2   |  19<L>  |  0.92    Ca    9.0      08 Mar 2023 05:30  Phos  2.9     03-08  Mg     2.1     03-08    TPro  6.1  /  Alb  3.4  /  TBili  0.9  /  DBili  <0.2  /  AST  20  /  ALT  9<L>  /  AlkPhos  82  03-08    PT/INR - ( 07 Mar 2023 07:05 )   PT: 12.2 sec;   INR: 1.05          PTT - ( 07 Mar 2023 07:05 )  PTT:27.4 sec  Urinalysis Basic - ( 07 Mar 2023 08:22 )    Color: Orange / Appearance: SL Cloudy / SG: >=1.030 / pH: x  Gluc: x / Ketone: Trace mg/dL  / Bili: Moderate / Urobili: 1.0 E.U./dL   Blood: x / Protein: Trace mg/dL / Nitrite: POSITIVE   Leuk Esterase: NEGATIVE / RBC: < 5 /HPF / WBC < 5 /HPF   Sq Epi: x / Non Sq Epi: 0-5 /HPF / Bacteria: Present /HPF          ----- RADIOLOGY & ADDITIONAL TESTS -----     Reviewed ----- INTERVAL HPI/OVERNIGHT EVENTS -----     Patient was seen and examined at bedside. As per nurse and patient, no o/n events, patient resting comfortably. No complaints at this time. Patient denies: fever, chills, dizziness, weakness, HA, Changes in vision, CP, palpitations, SOB, cough, N/V/D/C, dysuria, changes in bowel movements, LE edema. ROS otherwise negative.  Pt claims to have had adequate urination and BM in the morning. He endorsed once again that episodes of dizziness and lightheadedness are directly associated with quick positional changes.       Subjective: Patient is a 82y old  Male who presents with a chief complaint of syncope (07 Mar 2023 20:19)      ----- VITAL SIGNS -----  T(F): 97.6 (03-08-23 @ 09:14)  HR: 58 (03-08-23 @ 09:00)  BP: 158/78 (03-08-23 @ 09:00)  RR: 18 (03-08-23 @ 09:00)  SpO2: 95% (03-08-23 @ 09:00)  Wt(kg): --      03-07-23 @ 07:01  -  03-08-23 @ 07:00  --------------------------------------------------------  IN: 570 mL / OUT: 150 mL / NET: 420 mL    03-08-23 @ 07:01  -  03-08-23 @ 12:03  --------------------------------------------------------  IN: 200 mL / OUT: 0 mL / NET: 200 mL        ----- Physical Exam -----     Constitutional: resting comfortably in bed; NAD  HEENT: anicteric sclera, no nasal discharge; uvula midline, no oropharyngeal erythema or exudates, MMM  Respiratory: CTA B/L; no W/R/R, no retractions  Cardiac: +S1/S2; RRR; no M/R/G appreciated  Gastrointestinal: abdomen soft, NT/ND, +BS, no rebound tenderness or guarding, there are bruising on suprapubic region which pt claims is due to pressing so he can urinate  Back: no CVAT B/L  Extremities: legs WWP, no clubbing or cyanosis; no peripheral edema  Vascular: 2+ distal pedal pulses B/L  Dermatologic: skin warm, dry and intact; no rashes, wounds, or scars  Neurologic: AAOx3; CNII-XII grossly intact;  Psychiatric: affect and characteristics of appearance, verbalizations, behaviors are appropriate    MEDICATIONS  (STANDING):  finasteride 5 milliGRAM(s) Oral daily  heparin   Injectable 5000 Unit(s) SubCutaneous every 8 hours  sodium chloride 0.9% Bolus 500 milliLiter(s) IV Bolus once  sodium chloride 0.9%. 1000 milliLiter(s) (150 mL/Hr) IV Continuous <Continuous>  tamsulosin 0.4 milliGRAM(s) Oral at bedtime    MEDICATIONS  (PRN):  acetaminophen     Tablet .. 650 milliGRAM(s) Oral every 6 hours PRN Mild Pain (1 - 3)  melatonin 5 milliGRAM(s) Oral at bedtime PRN Insomnia      Allergies    No Known Drug Allergies  Shrimp (Unknown)    Intolerances        ----- LABS -----                         12.7   6.65  )-----------( 176      ( 08 Mar 2023 05:30 )             38.8     03-08    136  |  105  |  34<H>  ----------------------------<  99  4.2   |  19<L>  |  0.92    Ca    9.0      08 Mar 2023 05:30  Phos  2.9     03-08  Mg     2.1     03-08    TPro  6.1  /  Alb  3.4  /  TBili  0.9  /  DBili  <0.2  /  AST  20  /  ALT  9<L>  /  AlkPhos  82  03-08    PT/INR - ( 07 Mar 2023 07:05 )   PT: 12.2 sec;   INR: 1.05          PTT - ( 07 Mar 2023 07:05 )  PTT:27.4 sec  Urinalysis Basic - ( 07 Mar 2023 08:22 )    Color: Orange / Appearance: SL Cloudy / SG: >=1.030 / pH: x  Gluc: x / Ketone: Trace mg/dL  / Bili: Moderate / Urobili: 1.0 E.U./dL   Blood: x / Protein: Trace mg/dL / Nitrite: POSITIVE   Leuk Esterase: NEGATIVE / RBC: < 5 /HPF / WBC < 5 /HPF   Sq Epi: x / Non Sq Epi: 0-5 /HPF / Bacteria: Present /HPF          ----- RADIOLOGY & ADDITIONAL TESTS -----     Reviewed

## 2023-03-09 ENCOUNTER — TRANSCRIPTION ENCOUNTER (OUTPATIENT)
Age: 83
End: 2023-03-09

## 2023-03-09 VITALS — DIASTOLIC BLOOD PRESSURE: 83 MMHG | SYSTOLIC BLOOD PRESSURE: 149 MMHG

## 2023-03-09 PROBLEM — N18.30 CHRONIC KIDNEY DISEASE, STAGE 3 UNSPECIFIED: Chronic | Status: ACTIVE | Noted: 2023-03-07

## 2023-03-09 PROBLEM — Z00.00 ENCOUNTER FOR PREVENTIVE HEALTH EXAMINATION: Status: ACTIVE | Noted: 2023-03-09

## 2023-03-09 LAB
ANION GAP SERPL CALC-SCNC: 8 MMOL/L — SIGNIFICANT CHANGE UP (ref 5–17)
BASOPHILS # BLD AUTO: 0.03 K/UL — SIGNIFICANT CHANGE UP (ref 0–0.2)
BASOPHILS NFR BLD AUTO: 0.5 % — SIGNIFICANT CHANGE UP (ref 0–2)
BUN SERPL-MCNC: 28 MG/DL — HIGH (ref 7–23)
CALCIUM SERPL-MCNC: 9.2 MG/DL — SIGNIFICANT CHANGE UP (ref 8.4–10.5)
CHLORIDE SERPL-SCNC: 102 MMOL/L — SIGNIFICANT CHANGE UP (ref 96–108)
CO2 SERPL-SCNC: 24 MMOL/L — SIGNIFICANT CHANGE UP (ref 22–31)
CREAT SERPL-MCNC: 0.95 MG/DL — SIGNIFICANT CHANGE UP (ref 0.5–1.3)
CULTURE RESULTS: SIGNIFICANT CHANGE UP
CULTURE RESULTS: SIGNIFICANT CHANGE UP
EGFR: 79 ML/MIN/1.73M2 — SIGNIFICANT CHANGE UP
EOSINOPHIL # BLD AUTO: 0.09 K/UL — SIGNIFICANT CHANGE UP (ref 0–0.5)
EOSINOPHIL NFR BLD AUTO: 1.6 % — SIGNIFICANT CHANGE UP (ref 0–6)
GLUCOSE SERPL-MCNC: 93 MG/DL — SIGNIFICANT CHANGE UP (ref 70–99)
HCT VFR BLD CALC: 36.3 % — LOW (ref 39–50)
HGB BLD-MCNC: 12.5 G/DL — LOW (ref 13–17)
IMM GRANULOCYTES NFR BLD AUTO: 0.9 % — SIGNIFICANT CHANGE UP (ref 0–0.9)
LYMPHOCYTES # BLD AUTO: 1.54 K/UL — SIGNIFICANT CHANGE UP (ref 1–3.3)
LYMPHOCYTES # BLD AUTO: 26.8 % — SIGNIFICANT CHANGE UP (ref 13–44)
MAGNESIUM SERPL-MCNC: 2 MG/DL — SIGNIFICANT CHANGE UP (ref 1.6–2.6)
MCHC RBC-ENTMCNC: 34.4 GM/DL — SIGNIFICANT CHANGE UP (ref 32–36)
MCHC RBC-ENTMCNC: 34.6 PG — HIGH (ref 27–34)
MCV RBC AUTO: 100.6 FL — HIGH (ref 80–100)
MONOCYTES # BLD AUTO: 0.35 K/UL — SIGNIFICANT CHANGE UP (ref 0–0.9)
MONOCYTES NFR BLD AUTO: 6.1 % — SIGNIFICANT CHANGE UP (ref 2–14)
NEUTROPHILS # BLD AUTO: 3.68 K/UL — SIGNIFICANT CHANGE UP (ref 1.8–7.4)
NEUTROPHILS NFR BLD AUTO: 64.1 % — SIGNIFICANT CHANGE UP (ref 43–77)
NRBC # BLD: 0 /100 WBCS — SIGNIFICANT CHANGE UP (ref 0–0)
PHOSPHATE SERPL-MCNC: 2.6 MG/DL — SIGNIFICANT CHANGE UP (ref 2.5–4.5)
PLATELET # BLD AUTO: 213 K/UL — SIGNIFICANT CHANGE UP (ref 150–400)
POTASSIUM SERPL-MCNC: 3.9 MMOL/L — SIGNIFICANT CHANGE UP (ref 3.5–5.3)
POTASSIUM SERPL-SCNC: 3.9 MMOL/L — SIGNIFICANT CHANGE UP (ref 3.5–5.3)
RBC # BLD: 3.61 M/UL — LOW (ref 4.2–5.8)
RBC # FLD: 12.9 % — SIGNIFICANT CHANGE UP (ref 10.3–14.5)
SODIUM SERPL-SCNC: 134 MMOL/L — LOW (ref 135–145)
SPECIMEN SOURCE: SIGNIFICANT CHANGE UP
SPECIMEN SOURCE: SIGNIFICANT CHANGE UP
WBC # BLD: 5.74 K/UL — SIGNIFICANT CHANGE UP (ref 3.8–10.5)
WBC # FLD AUTO: 5.74 K/UL — SIGNIFICANT CHANGE UP (ref 3.8–10.5)

## 2023-03-09 PROCEDURE — 71260 CT THORAX DX C+: CPT

## 2023-03-09 PROCEDURE — 85025 COMPLETE CBC W/AUTO DIFF WBC: CPT

## 2023-03-09 PROCEDURE — 36415 COLL VENOUS BLD VENIPUNCTURE: CPT

## 2023-03-09 PROCEDURE — 97162 PT EVAL MOD COMPLEX 30 MIN: CPT

## 2023-03-09 PROCEDURE — 81001 URINALYSIS AUTO W/SCOPE: CPT

## 2023-03-09 PROCEDURE — 84484 ASSAY OF TROPONIN QUANT: CPT

## 2023-03-09 PROCEDURE — 83036 HEMOGLOBIN GLYCOSYLATED A1C: CPT

## 2023-03-09 PROCEDURE — 99233 SBSQ HOSP IP/OBS HIGH 50: CPT

## 2023-03-09 PROCEDURE — 97530 THERAPEUTIC ACTIVITIES: CPT

## 2023-03-09 PROCEDURE — 83735 ASSAY OF MAGNESIUM: CPT

## 2023-03-09 PROCEDURE — 80048 BASIC METABOLIC PNL TOTAL CA: CPT

## 2023-03-09 PROCEDURE — 80061 LIPID PANEL: CPT

## 2023-03-09 PROCEDURE — 85610 PROTHROMBIN TIME: CPT

## 2023-03-09 PROCEDURE — G0378: CPT

## 2023-03-09 PROCEDURE — 82962 GLUCOSE BLOOD TEST: CPT

## 2023-03-09 PROCEDURE — 74177 CT ABD & PELVIS W/CONTRAST: CPT

## 2023-03-09 PROCEDURE — 97116 GAIT TRAINING THERAPY: CPT

## 2023-03-09 PROCEDURE — 80053 COMPREHEN METABOLIC PANEL: CPT

## 2023-03-09 PROCEDURE — 87637 SARSCOV2&INF A&B&RSV AMP PRB: CPT

## 2023-03-09 PROCEDURE — 80076 HEPATIC FUNCTION PANEL: CPT

## 2023-03-09 PROCEDURE — 85730 THROMBOPLASTIN TIME PARTIAL: CPT

## 2023-03-09 PROCEDURE — 99285 EMERGENCY DEPT VISIT HI MDM: CPT

## 2023-03-09 PROCEDURE — 93306 TTE W/DOPPLER COMPLETE: CPT

## 2023-03-09 PROCEDURE — 84100 ASSAY OF PHOSPHORUS: CPT

## 2023-03-09 PROCEDURE — 70450 CT HEAD/BRAIN W/O DYE: CPT

## 2023-03-09 PROCEDURE — 87086 URINE CULTURE/COLONY COUNT: CPT

## 2023-03-09 PROCEDURE — 72125 CT NECK SPINE W/O DYE: CPT

## 2023-03-09 PROCEDURE — 83690 ASSAY OF LIPASE: CPT

## 2023-03-09 PROCEDURE — 93005 ELECTROCARDIOGRAM TRACING: CPT

## 2023-03-09 PROCEDURE — 85027 COMPLETE CBC AUTOMATED: CPT

## 2023-03-09 RX ORDER — ACETAMINOPHEN 500 MG
975 TABLET ORAL EVERY 8 HOURS
Refills: 0 | Status: DISCONTINUED | OUTPATIENT
Start: 2023-03-09 | End: 2023-03-09

## 2023-03-09 RX ADMIN — Medication 975 MILLIGRAM(S): at 11:00

## 2023-03-09 RX ADMIN — Medication 650 MILLIGRAM(S): at 00:01

## 2023-03-09 RX ADMIN — Medication 10 MILLIGRAM(S): at 09:55

## 2023-03-09 RX ADMIN — FINASTERIDE 5 MILLIGRAM(S): 5 TABLET, FILM COATED ORAL at 09:55

## 2023-03-09 RX ADMIN — Medication 975 MILLIGRAM(S): at 09:56

## 2023-03-09 NOTE — PROGRESS NOTE ADULT - PROBLEM SELECTOR PLAN 6
F: tolerating PO, no IVF  E: replete K<4, Mg<2  N: Dash/TLC  VTE Prophylaxis: heparin subcu  GI: not needed  C: Full Code  D: Tele
F: tolerating PO, no IVF  E: replete K<4, Mg<2  N: Dash/TLC  VTE Prophylaxis: heparin subcu  GI: not needed  C: Full Code  D: Tele

## 2023-03-09 NOTE — CHART NOTE - NSCHARTNOTEFT_GEN_A_CORE
At 5:30 PM was called to the bedside by the nurse that the patient is requesting to leave against medical advices. Attended to the patient and tried to inquire about the reason he would like to leave now without giving us the opportunity to complete his medical workup. Pt required another day of hospitalization to complete a limited echo to assess for presence/severity of Aortic Stenosis which if severe enough could be fatal and may contribute to his bouts of syncope which he presented with.   The benefits and necessity of this imaging test along with risks of not having it done and leaving against medical advice was clearly explained to the patient and witnessed by the nurse. Pt claimed understanding and was able to verbalize the risks but refused to sign the AMA form. He believed he was lied to and not explained when the echo test was being done. On the contrary, I personally had attended to the patient few hours prior to explain clearly that the echo lab is backed up and he will not be getting his test done today and instead will get done tomorrow (3/10/2023). At that time, patient claimed understanding and willingness to wait until the morning. At 5:30 PM was called to the bedside by the nurse that the patient is requesting to leave against medical advices. Attended to the patient and tried to inquire about the reason he would like to leave now without giving us the opportunity to complete his medical workup. Pt required another day of hospitalization to complete a limited echo to assess for presence/severity of Aortic Stenosis which if severe enough could be fatal and may contribute to his bouts of syncope which he presented with.   The benefits and necessity of this imaging test along with risks of not having it done and leaving against medical advice was clearly explained to the patient and witnessed by the nurse. Pt claimed understanding and was able to verbalize the risks but refused to sign the AMA form. He believed he was lied to and not explained when the echo test was being done. On the contrary, I personally had attended to the patient few hours prior to explain clearly that the echo lab is backed up and he will not be getting his test done today and instead will get done tomorrow (3/10/2023). At that time, patient claimed understanding and willingness to wait until the morning. Pt had full capacity at the time of AMA.

## 2023-03-09 NOTE — DISCHARGE NOTE PROVIDER - CARE PROVIDER_API CALL
Your, Urologist  Please followo up within 7 days  Phone: (   )    -  Fax: (   )    -  Follow Up Time:

## 2023-03-09 NOTE — DISCHARGE NOTE PROVIDER - NSDCMRMEDTOKEN_GEN_ALL_CORE_FT
enalapril 10 mg oral tablet: 1 tab(s) orally once a day  finasteride 5 mg oral tablet: 1 tab(s) orally once a day  tamsulosin 0.4 mg oral capsule: 1 cap(s) orally once a day

## 2023-03-09 NOTE — PROGRESS NOTE ADULT - NUTRITIONAL ASSESSMENT
This patient has been assessed with a concern for Malnutrition and has been determined to have a diagnosis/diagnoses of Severe protein-calorie malnutrition and Underweight (BMI < 19).    This patient is being managed with:   Diet DASH/TLC-  Sodium & Cholesterol Restricted  Soft and Bite Sized ([id number])  Entered: Mar  7 2023  8:31PM

## 2023-03-09 NOTE — PROGRESS NOTE ADULT - PROBLEM SELECTOR PLAN 5
Pt hx of IBD constipation type. Currently scheduled to see a new gastroenterologist.   - not currently on any home meds
Pt hx of IBD constipation type. Currently scheduled to see a new gastroenterologist.   - not currently on any home meds

## 2023-03-09 NOTE — PROGRESS NOTE ADULT - PROBLEM SELECTOR PLAN 4
Pt w/ hx of urinary frequency and hesitancy. follows outpt urologist.   - on flomax and finasteride   - monitor output
Pt w/ hx of urinary frequency and hesitancy. follows outpt urologist.   - on flomax and finasteride   - monitor output

## 2023-03-09 NOTE — DISCHARGE NOTE PROVIDER - NSDCCPCAREPLAN_GEN_ALL_CORE_FT
No PRINCIPAL DISCHARGE DIAGNOSIS  Diagnosis: Syncope  Assessment and Plan of Treatment: You presented because of a syncope, fall and episode of lightheadedness/dizziness. Based on the workup you had done in the hospital, the reason for this fall is a condition called orthostatic hypotension. This condition happens when your autonomic system is unable to adjust to positional changes quickly. This condition is exacerbated by low oral intake of fluids, medications like flomax and forcing urine out.   While you were hospitalized, we gave you IV fluids and your blood pressure recovered and this conditioned subsided for now. However this is a temporary measure. It is important for you to keep up with drinking adequate water daily and having close cardiology and PCP follow up.   As your urinary hesitancy difficulties is one of the culprits in your condition, it is important for you to have close urology follow up to see if any surgical interventions are warranted.

## 2023-03-09 NOTE — DISCHARGE NOTE PROVIDER - DETAILS OF MALNUTRITION DIAGNOSIS/DIAGNOSES
This patient has been assessed with a concern for Malnutrition and was treated during this hospitalization for the following Nutrition diagnosis/diagnoses:     -  03/08/2023: Severe protein-calorie malnutrition   -  03/08/2023: Underweight (BMI < 19)

## 2023-03-09 NOTE — CONSULT NOTE ADULT - SUBJECTIVE AND OBJECTIVE BOX
Briefly  82M with history of HTN, BPH, IBD, CKD3, who p/w a fall that was preceeded by dizziness and lightheadedness. EP asked to eval for outpatient cardiac monitor. Will arrange with outpatient EP team for 2 week ziopatch monitor and outpatient follow up for results review. Pt also has an appt to see a general cardiologist as an outpatient.

## 2023-03-09 NOTE — PROGRESS NOTE ADULT - PROBLEM SELECTOR PLAN 2
As above.  On home flomax  - gentle fluid hydration of 100cc/hr for 5 hrs due to AS  - monitor vitals  - fall precautions
As above.  On home flomax  - gentle fluid hydration of 100cc/hr for 5 hrs due to AS  - monitor vitals  - fall precautions

## 2023-03-09 NOTE — DISCHARGE NOTE PROVIDER - NSDCACTIVITY_GEN_ALL_CORE
No restrictions
return to ED if symptoms worsen, persist or questions arise/radiology results/lab results/need for outpatient follow-up

## 2023-03-09 NOTE — PROGRESS NOTE ADULT - PROBLEM SELECTOR PLAN 3
Pt w/ history of HTN. Unable to endorse baseline. On home enalapril 10mg  - -200s upon arrival  s/p hydral 5IV x1   - meds: holding home enalapril 5mg daily 2/2 CKD
Pt w/ history of HTN. Unable to endorse baseline. On home enalapril 10mg  - -200s upon arrival  s/p hydral 5IV x1   - meds: holding home enalapril 5mg daily 2/2 CKD

## 2023-03-09 NOTE — PROGRESS NOTE ADULT - ASSESSMENT
82M with PMHx HTN, BPH, diverticulosis, Inflammatory Bowel Disease, Gout, CKD Stage III (GFR 42 Cr 1.63 baseline Cr unknown) who presented to [facility] ED 3/7/23 s/p fall vs. syncope.

## 2023-03-09 NOTE — DISCHARGE NOTE PROVIDER - NSDCFUADDAPPT_GEN_ALL_CORE_FT
Please follow up with your Urologist  Please follow up with your PCP Please follow up with your cardiologist  Please follow up with your Urologist  Please follow up with your PCP

## 2023-03-09 NOTE — PROGRESS NOTE ADULT - PROBLEM SELECTOR PLAN 1
Pt w/ hx of dizziness/lightheadedness on acute positional changes. Pt claims that this is worse at night after taking the flomax dose. Pt claims recent episode of fall and syncope was associated with recent flomax dose and quick positional changes from the bed. Troponins negative on admission  - OSH EKG: NSR 67 bpm with PACs, LAFB, no ischemic changes  - OSH CT imaging unremarkable   - Last ischemic eval unknown   - Monitor tele   - Per TTE evidence of aortic stenosis but unable to diff severity due to poor study. pt pending repeat limited TTE  - pt pending holter monitor prior to d/c  - Orthostatics positive

## 2023-03-09 NOTE — GOALS OF CARE CONVERSATION - ADVANCED CARE PLANNING - CONVERSATION DETAILS
Discussed code status with patient. Patient would rather allow natural death than to undergo intubation or cardiac resuscitation. Patient says that if he cannot make decisions, he would like his neighbor, Yoon Castillo to be his health care proxy.     HCP Contact information:  Yoon Castillo (neighbor)  [phone]    HCP forms and MOLST filled out and placed in chart.

## 2023-03-09 NOTE — PROGRESS NOTE ADULT - ATTENDING COMMENTS
Patient seen and examined. Case discussed with resident team.     Pt described syncopal event at home, thinks he might have been dehydrated, had mechanical fall prior to that  transferred from [facility] to [facility] tele due to report of HR 40s on monitor at [facility]  Feels well today, denies complaints, has abd discomfort since fall that improves with acetaminophen  EKG SR, PACs, LAFB  labs stable hgb, Cr  On exam NAD lungs clear cv sys murmur abd soft no BREANA  -TTE noted with AS but unable to tell degree - orthostatic VS now neg - pending repeat study to exclude severe AS given syncope presentation   -urine cx mixed abhishek, holding further abx   -tele unremarkable here, set up for outpt amb monitor by EP given report of bradycardia at [facility] ER   -Cr normalized and orthostatic neg now - home ACEI for BP restarted  -PT eval pending stairs  -outpt follow-up with his internist and set up for [facility] follow-up as well for AS    Atiya Kahn MD

## 2023-03-09 NOTE — DISCHARGE NOTE PROVIDER - HOSPITAL COURSE
#Discharge: do not delete    LENA LUND is a 82y Male with a past medical history of HTN, BPH, diverticulosis, Inflammatory Bowel Disease, Gout, CKD Stage III (GFR 42 Cr 1.63 baseline Cr unknown) who presented to [facility] ED 3/7/23 s/p fall vs. syncope. Pt claimed he felt dizzy and lightheaded without any palpitation, loss of continence/tongue biting and had falls twice. Pt endorses a short transient LOC. Pt presented to the ED complaining of left sided abdominal pain and rib pain. On arrival EKG was significant for NSR w/ PACs and LAFB w/o ischemic changes. Imaging was negative for any acute pathology/bleeds. PT was admitted for syncope workup. Pt endorses living in a very hot apartment and feeling like he hasn't been able to drink appropriate amounts of water. Issue that has been exacerbated by his worsening BPH and inability for fully void. Of note, pt has bruising on suprapubic area that claims is from pushing his bladder to fully urinate. Orthostatics were positive. Pt was started on gentle hydration and TTE was done that showed --------. On day 2 of admission, pt orthostatics were negative. Pt evaluated the pt and discharged with PCP, cardiology and urology follow up as well as a holter monitor.       Problem List/Main Diagnoses (system-based):     1) Syncope  Pt presented with an episode of syncope. Was not associated with prodromal symptoms. Denies palpitations, exertion, aura, incontinence, postictal presentation. Pt endorses of having gotten up from the bed too fast and had recently taken his flomax.     2) Orthostatic hypotension  Per hx significant for syncope 2/2 orthostatic hypotension. Pt on flomax and claims poor po fluid intake recently.     3) BPH  Pt has had difficulty urinating which has worsened recently. Pt sees outpt urologist. currently on home flomax and finasteride. Pt to f/u outpt. Bladder scans on admission were <300 and did not warrant interventions.     Patient was discharged to: (home)    New medications: None  Changes to old medications: None  Medications that were stopped: None    Items to follow up as outpatient: Urology f/u (BPH, possible TURP), PCP f/u, Cardiology f/u (Aortic stenosis)    Physical exam at the time of discharge:   Constitutional: resting comfortably in bed; NAD  HEENT: anicteric sclera, no nasal discharge; uvula midline, no oropharyngeal erythema or exudates, MMM  Respiratory: CTA B/L; no W/R/R, no retractions  Cardiac: +S1/S2; RRR; no M/R/G appreciated  Gastrointestinal: abdomen soft, NT/ND, +BS, no rebound tenderness or guarding, there are bruising on suprapubic region which pt claims is due to pressing so he can urinate  Back: no CVAT B/L  Extremities: legs WWP, no clubbing or cyanosis; no peripheral edema  Vascular: 2+ distal pedal pulses B/L  Dermatologic: skin warm, dry and intact; no rashes, wounds, or scars  Neurologic: AAOx3; CNII-XII grossly intact;  Psychiatric: affect and characteristics of appearance, verbalizations, behaviors are appropriate

## 2023-03-09 NOTE — PROGRESS NOTE ADULT - SUBJECTIVE AND OBJECTIVE BOX
----- INTERVAL HPI/OVERNIGHT EVENTS -----     Patient was seen and examined at bedside. As per nurse and patient, no o/n events, patient resting comfortably. No complaints at this time. Patient denies: fever, chills, dizziness, weakness, HA, Changes in vision, CP, palpitations, SOB, cough, N/V/D/C, dysuria, changes in bowel movements, LE edema. ROS otherwise negative.      Subjective: Patient is a 82y old  Male who presents with a chief complaint of syncope (09 Mar 2023 12:32)      ----- VITAL SIGNS -----  T(F): 97.5 (03-09-23 @ 14:11)  HR: 54 (03-09-23 @ 09:08)  BP: 136/69 (03-09-23 @ 09:08)  RR: 18 (03-09-23 @ 09:08)  SpO2: 98% (03-09-23 @ 09:08)  Wt(kg): --      03-08-23 @ 07:01  -  03-09-23 @ 07:00  --------------------------------------------------------  IN: 820 mL / OUT: 700 mL / NET: 120 mL    03-09-23 @ 07:01  -  03-09-23 @ 14:58  --------------------------------------------------------  IN: 120 mL / OUT: 0 mL / NET: 120 mL        ----- Physical Exam -----     Constitutional: resting comfortably in bed; NAD  HEENT: NC/AT, PERRL, EOMI, anicteric sclera, no nasal discharge; uvula midline, no oropharyngeal erythema or exudates, MMM; no thyromegaly or anterior/posterior or submental JUNIE; neck supple  Respiratory: CTA B/L; no W/R/R, no retractions  Cardiac: +S1/S2; RRR; no M/R/G appreciated  Gastrointestinal: abdomen soft, NT/ND, +BS, no rebound tenderness or guarding  Back: no CVAT B/L  Extremities: legs WWP, no clubbing or cyanosis; no peripheral edema  Vascular: 2+ distal pedal pulses B/L  Dermatologic: skin warm, dry and intact; no rashes, wounds, or scars  Neurologic: AAOx3; CNII-XII grossly intact; strength 5/5 and sensation intact in all 4 extremities; no focal deficits  Psychiatric: affect and characteristics of appearance, verbalizations, behaviors are appropriate    MEDICATIONS  (STANDING):  enalapril 10 milliGRAM(s) Oral every 24 hours  finasteride 5 milliGRAM(s) Oral daily  heparin   Injectable 5000 Unit(s) SubCutaneous every 8 hours  tamsulosin 0.4 milliGRAM(s) Oral at bedtime    MEDICATIONS  (PRN):  acetaminophen     Tablet .. 975 milliGRAM(s) Oral every 8 hours PRN Mild Pain (1 - 3)  melatonin 5 milliGRAM(s) Oral at bedtime PRN Insomnia      Allergies    No Known Drug Allergies  Shrimp (Unknown)    Intolerances        ----- LABS -----                         12.5   5.74  )-----------( 213      ( 09 Mar 2023 05:30 )             36.3     03-09    134<L>  |  102  |  28<H>  ----------------------------<  93  3.9   |  24  |  0.95    Ca    9.2      09 Mar 2023 05:30  Phos  2.6     03-09  Mg     2.0     03-09    TPro  6.1  /  Alb  3.4  /  TBili  0.9  /  DBili  <0.2  /  AST  20  /  ALT  9<L>  /  AlkPhos  82  03-08            ----- RADIOLOGY & ADDITIONAL TESTS -----     Reviewed ----- INTERVAL HPI/OVERNIGHT EVENTS -----     Patient was seen and examined at bedside. As per nurse and patient, no o/n events, patient resting comfortably. No complaints at this time. Patient denies: fever, chills, dizziness, weakness, HA, Changes in vision, CP, palpitations, SOB, cough, N/V/D/C, dysuria, changes in bowel movements, LE edema. ROS otherwise negative.      Subjective: Patient is a 82y old  Male who presents with a chief complaint of syncope (09 Mar 2023 12:32)      ----- VITAL SIGNS -----  T(F): 97.5 (03-09-23 @ 14:11)  HR: 54 (03-09-23 @ 09:08)  BP: 136/69 (03-09-23 @ 09:08)  RR: 18 (03-09-23 @ 09:08)  SpO2: 98% (03-09-23 @ 09:08)  Wt(kg): --      03-08-23 @ 07:01  -  03-09-23 @ 07:00  --------------------------------------------------------  IN: 820 mL / OUT: 700 mL / NET: 120 mL    03-09-23 @ 07:01  -  03-09-23 @ 14:58  --------------------------------------------------------  IN: 120 mL / OUT: 0 mL / NET: 120 mL        ----- Physical Exam -----     Constitutional: resting comfortably in bed; NAD  HEENT: anicteric sclera, no nasal discharge; uvula midline, no oropharyngeal erythema or exudates, MMM  Respiratory: CTA B/L; no W/R/R, no retractions  Cardiac: +S1/S2; RRR; no M/R/G appreciated  Gastrointestinal: abdomen soft, NT/ND, +BS, no rebound tenderness or guarding, there are bruising on suprapubic region which pt claims is due to pressing so he can urinate  Back: no CVAT B/L  Extremities: legs WWP, no clubbing or cyanosis; no peripheral edema  Vascular: 2+ distal pedal pulses B/L  Dermatologic: skin warm, dry and intact; no rashes, wounds, or scars  Neurologic: AAOx3; CNII-XII grossly intact;  Psychiatric: affect and characteristics of appearance, verbalizations, behaviors are appropriate    MEDICATIONS  (STANDING):  enalapril 10 milliGRAM(s) Oral every 24 hours  finasteride 5 milliGRAM(s) Oral daily  heparin   Injectable 5000 Unit(s) SubCutaneous every 8 hours  tamsulosin 0.4 milliGRAM(s) Oral at bedtime    MEDICATIONS  (PRN):  acetaminophen     Tablet .. 975 milliGRAM(s) Oral every 8 hours PRN Mild Pain (1 - 3)  melatonin 5 milliGRAM(s) Oral at bedtime PRN Insomnia      Allergies    No Known Drug Allergies  Shrimp (Unknown)    Intolerances        ----- LABS -----                         12.5   5.74  )-----------( 213      ( 09 Mar 2023 05:30 )             36.3     03-09    134<L>  |  102  |  28<H>  ----------------------------<  93  3.9   |  24  |  0.95    Ca    9.2      09 Mar 2023 05:30  Phos  2.6     03-09  Mg     2.0     03-09    TPro  6.1  /  Alb  3.4  /  TBili  0.9  /  DBili  <0.2  /  AST  20  /  ALT  9<L>  /  AlkPhos  82  03-08            ----- RADIOLOGY & ADDITIONAL TESTS -----     Reviewed

## 2023-03-09 NOTE — DISCHARGE NOTE PROVIDER - PROVIDER TOKENS
FREE:[LAST:[Your],FIRST:[Urologist],PHONE:[(   )    -],FAX:[(   )    -],ADDRESS:[Please followo up within 7 days]]

## 2023-03-10 ENCOUNTER — EMERGENCY (EMERGENCY)
Facility: HOSPITAL | Age: 83
LOS: 1 days | Discharge: HOME | End: 2023-03-10
Attending: EMERGENCY MEDICINE | Admitting: EMERGENCY MEDICINE
Payer: MEDICARE

## 2023-03-10 VITALS
TEMPERATURE: 98 F | SYSTOLIC BLOOD PRESSURE: 107 MMHG | HEART RATE: 67 BPM | HEIGHT: 73 IN | OXYGEN SATURATION: 97 % | DIASTOLIC BLOOD PRESSURE: 66 MMHG | RESPIRATION RATE: 17 BRPM | WEIGHT: 136.03 LBS

## 2023-03-10 VITALS — DIASTOLIC BLOOD PRESSURE: 70 MMHG | SYSTOLIC BLOOD PRESSURE: 115 MMHG

## 2023-03-10 DIAGNOSIS — Z98.890 OTHER SPECIFIED POSTPROCEDURAL STATES: Chronic | ICD-10-CM

## 2023-03-10 DIAGNOSIS — Z90.89 ACQUIRED ABSENCE OF OTHER ORGANS: Chronic | ICD-10-CM

## 2023-03-10 DIAGNOSIS — Z90.49 ACQUIRED ABSENCE OF OTHER SPECIFIED PARTS OF DIGESTIVE TRACT: Chronic | ICD-10-CM

## 2023-03-10 LAB
ALBUMIN SERPL ELPH-MCNC: 3.4 G/DL — SIGNIFICANT CHANGE UP (ref 3.4–5)
ALP SERPL-CCNC: 84 U/L — SIGNIFICANT CHANGE UP (ref 40–120)
ALT FLD-CCNC: 23 U/L — SIGNIFICANT CHANGE UP (ref 12–42)
ANION GAP SERPL CALC-SCNC: 6 MMOL/L — LOW (ref 9–16)
AST SERPL-CCNC: 20 U/L — SIGNIFICANT CHANGE UP (ref 15–37)
BASOPHILS # BLD AUTO: 0.03 K/UL — SIGNIFICANT CHANGE UP (ref 0–0.2)
BASOPHILS NFR BLD AUTO: 0.4 % — SIGNIFICANT CHANGE UP (ref 0–2)
BILIRUB SERPL-MCNC: 0.9 MG/DL — SIGNIFICANT CHANGE UP (ref 0.2–1.2)
BUN SERPL-MCNC: 39 MG/DL — HIGH (ref 7–23)
CALCIUM SERPL-MCNC: 9.5 MG/DL — SIGNIFICANT CHANGE UP (ref 8.5–10.5)
CHLORIDE SERPL-SCNC: 103 MMOL/L — SIGNIFICANT CHANGE UP (ref 96–108)
CO2 SERPL-SCNC: 28 MMOL/L — SIGNIFICANT CHANGE UP (ref 22–31)
CREAT SERPL-MCNC: 1.51 MG/DL — HIGH (ref 0.5–1.3)
EGFR: 46 ML/MIN/1.73M2 — LOW
EOSINOPHIL # BLD AUTO: 0.06 K/UL — SIGNIFICANT CHANGE UP (ref 0–0.5)
EOSINOPHIL NFR BLD AUTO: 0.9 % — SIGNIFICANT CHANGE UP (ref 0–6)
GLUCOSE SERPL-MCNC: 115 MG/DL — HIGH (ref 70–99)
HCT VFR BLD CALC: 38.4 % — LOW (ref 39–50)
HGB BLD-MCNC: 12.9 G/DL — LOW (ref 13–17)
IMM GRANULOCYTES NFR BLD AUTO: 0.9 % — SIGNIFICANT CHANGE UP (ref 0–0.9)
LACTATE SERPL-SCNC: 1.3 MMOL/L — SIGNIFICANT CHANGE UP (ref 0.4–2)
LIDOCAIN IGE QN: 65 U/L — LOW (ref 73–393)
LYMPHOCYTES # BLD AUTO: 1.65 K/UL — SIGNIFICANT CHANGE UP (ref 1–3.3)
LYMPHOCYTES # BLD AUTO: 23.9 % — SIGNIFICANT CHANGE UP (ref 13–44)
MCHC RBC-ENTMCNC: 33.6 GM/DL — SIGNIFICANT CHANGE UP (ref 32–36)
MCHC RBC-ENTMCNC: 34.3 PG — HIGH (ref 27–34)
MCV RBC AUTO: 102.1 FL — HIGH (ref 80–100)
MONOCYTES # BLD AUTO: 0.42 K/UL — SIGNIFICANT CHANGE UP (ref 0–0.9)
MONOCYTES NFR BLD AUTO: 6.1 % — SIGNIFICANT CHANGE UP (ref 2–14)
NEUTROPHILS # BLD AUTO: 4.69 K/UL — SIGNIFICANT CHANGE UP (ref 1.8–7.4)
NEUTROPHILS NFR BLD AUTO: 67.8 % — SIGNIFICANT CHANGE UP (ref 43–77)
NRBC # BLD: 0 /100 WBCS — SIGNIFICANT CHANGE UP (ref 0–0)
PLATELET # BLD AUTO: 221 K/UL — SIGNIFICANT CHANGE UP (ref 150–400)
POTASSIUM SERPL-MCNC: 4.2 MMOL/L — SIGNIFICANT CHANGE UP (ref 3.5–5.3)
POTASSIUM SERPL-SCNC: 4.2 MMOL/L — SIGNIFICANT CHANGE UP (ref 3.5–5.3)
PROT SERPL-MCNC: 7.2 G/DL — SIGNIFICANT CHANGE UP (ref 6.4–8.2)
RBC # BLD: 3.76 M/UL — LOW (ref 4.2–5.8)
RBC # FLD: 13.2 % — SIGNIFICANT CHANGE UP (ref 10.3–14.5)
SODIUM SERPL-SCNC: 137 MMOL/L — SIGNIFICANT CHANGE UP (ref 132–145)
WBC # BLD: 6.91 K/UL — SIGNIFICANT CHANGE UP (ref 3.8–10.5)
WBC # FLD AUTO: 6.91 K/UL — SIGNIFICANT CHANGE UP (ref 3.8–10.5)

## 2023-03-10 PROCEDURE — 99285 EMERGENCY DEPT VISIT HI MDM: CPT

## 2023-03-10 RX ORDER — ACETAMINOPHEN 500 MG
2 TABLET ORAL
Qty: 40 | Refills: 0
Start: 2023-03-10

## 2023-03-10 RX ORDER — SODIUM CHLORIDE 9 MG/ML
1000 INJECTION INTRAMUSCULAR; INTRAVENOUS; SUBCUTANEOUS ONCE
Refills: 0 | Status: COMPLETED | OUTPATIENT
Start: 2023-03-10 | End: 2023-03-10

## 2023-03-10 RX ORDER — OXYCODONE AND ACETAMINOPHEN 5; 325 MG/1; MG/1
1 TABLET ORAL
Qty: 10 | Refills: 0
Start: 2023-03-10 | End: 2023-03-14

## 2023-03-10 RX ORDER — MORPHINE SULFATE 50 MG/1
4 CAPSULE, EXTENDED RELEASE ORAL ONCE
Refills: 0 | Status: DISCONTINUED | OUTPATIENT
Start: 2023-03-10 | End: 2023-03-10

## 2023-03-10 RX ORDER — POLYETHYLENE GLYCOL 3350 17 G/17G
17 POWDER, FOR SOLUTION ORAL
Qty: 170 | Refills: 0
Start: 2023-03-10 | End: 2023-03-19

## 2023-03-10 RX ORDER — DICLOFENAC SODIUM 30 MG/G
40 GEL TOPICAL
Qty: 500 | Refills: 0
Start: 2023-03-10 | End: 2023-03-16

## 2023-03-10 RX ORDER — LIDOCAINE 4 G/100G
1 CREAM TOPICAL
Qty: 30 | Refills: 0
Start: 2023-03-10 | End: 2023-04-08

## 2023-03-10 RX ADMIN — MORPHINE SULFATE 4 MILLIGRAM(S): 50 CAPSULE, EXTENDED RELEASE ORAL at 11:47

## 2023-03-10 RX ADMIN — SODIUM CHLORIDE 1000 MILLILITER(S): 9 INJECTION INTRAMUSCULAR; INTRAVENOUS; SUBCUTANEOUS at 11:47

## 2023-03-10 NOTE — ED PROVIDER NOTE - PATIENT PORTAL LINK FT
You can access the FollowMyHealth Patient Portal offered by [facility] by registering at the following website: [email or web address]. By joining "[employer]"’s FollowMyHealth portal, you will also be able to view your health information using other applications (apps) compatible with our system.

## 2023-03-10 NOTE — ED ADULT TRIAGE NOTE - AS HEIGHT TYPE
Clinic Administered Medication Documentation    Administrations This Visit     medroxyPROGESTERone (DEPO-PROVERA) injection 150 mg     Admin Date  11/01/2021 Action  Given Dose  150 mg Route  Intramuscular Site  Left Upper Outer Quadrant Administered By  Alexus Deras RN    Ordering Provider: Olivia Jj MD    Patient Supplied?: No                  Depo Provera Documentation    URINE HCG: not indicated    Depo-Provera Standing Order inclusion/exclusion criteria reviewed.   Patient meets: inclusion criteria     BP: Data Unavailable  LAST PAP/EXAM: No results found for: PAP    Prior to injection, verified patient identity using patient's name and date of birth. Medication was administered. Please see MAR and medication order for additional information.     Was entire vial of medication used? Yes  Vial/Syringe: Single dose vial  Expiration Date:  08/2023    Patient instructed to report any adverse reaction to staff immediately .  NEXT INJECTION DUE: 1/17/22 - 1/31/22    Alexus Deras RN      
actual

## 2023-03-10 NOTE — ED PROVIDER NOTE - OBJECTIVE STATEMENT
82-year-old male past medical history of hypertension, BPH, DVT, CKD, recently admitted and then signed out AMA for syncope work-up.  Based on patient's charting she had an echo with aortic stenosis but cardiology wanted a repeat sonogram to further evaluate the aortic stenosis.  Patient did not want to wait for repeat sonogram so signed out AMA.  Since going home patient is complaining of persistent left hip pain which he attributes to having fallen on that side.  Took Tylenol at home.  Denies other acute complaints

## 2023-03-10 NOTE — ED ADULT NURSE NOTE - ED STAT RN HANDOFF DETAILS
pt stable in NAD, ambulatory, verbalized understanding of cardiology follow up and prescriptions at pharmacy,.

## 2023-03-10 NOTE — ED PROVIDER NOTE - PHYSICAL EXAMINATION
GENERAL: well appearing, no acute distress   HEAD: atraumatic   EYES: EOMI   ENT: moist oral mucosa   CARDIAC: regular rate  RESPIRATORY: no increased work of breathing   ABDO: Soft nontender nondistended  MUSCULOSKELETAL: no deformity; Mild tenderness to palpation to left iliac crest  NEUROLOGICAL: alert, spontaneous movement of extremities   SKIN: no visible rash  PSYCHIATRIC: cooperative

## 2023-03-10 NOTE — ED ADULT TRIAGE NOTE - CHIEF COMPLAINT QUOTE
BIBA c/o L lower chest wall pain/flank/abdomen s/p fall 4-5 days ago, signed out AMA from [facility] yesterday, but presents to ED for worsening pain. denies new injury. BIBA c/o L lower chest wall/flank/abdomen pain s/p fall 4-5 days ago, signed out AMA from [facility] yesterday, but presents to ED for worsening pain. denies new injury.

## 2023-03-10 NOTE — ED ADULT NURSE NOTE - CHIEF COMPLAINT QUOTE
BIBA c/o L lower chest wall pain/flank/abdomen s/p fall 4-5 days ago, signed out AMA from [facility] yesterday, but presents to ED for worsening pain. denies new injury.

## 2023-03-10 NOTE — ED PROVIDER NOTE - CARE PROVIDER_API CALL
Amado De Anda)  Cardiovascular Disease  [address] [place] [address]  Phone: [phone]  Fax: [phone]  Follow Up Time:

## 2023-03-10 NOTE — ED ADULT NURSE NOTE - OBJECTIVE STATEMENT
Pt came in c/o of dizziness, Left chest, rib, and abdomen pain since 3/7 after a syncopal episode. Pt reports pmh of htn and bph. Pt reports being admitted at [facility] yesterday, but AMA after not wanting to wait for his echo. Denies fever, n/v/d. A&Ox3 speaking in full sentences. Pt came in c/o of dizziness, Left chest, rib, and abdomen pain since 3/7 after a syncopal episode. Pt reports pmh of htn and bph. Pt reports being admitted at [facility] yesterday, but AMA after not wanting to wait for his echo. Denies new injuries or falls, fever, n/v/d. A&Ox3 speaking in full sentences.

## 2023-03-10 NOTE — ED PROVIDER NOTE - CLINICAL SUMMARY MEDICAL DECISION MAKING FREE TEXT BOX
82-year-old male with hip pain status post fall recently.  Suspect symptoms are related to contusion as patient had a pan scan no evidence of traumatic injuries with his initial ED evaluation.  Will provide pain meds.    In terms of patient's recent sonogram to evaluate for aortic stenosis, it is not warranted for repeat admission but explained that patient needs close follow-up with outpatient cardiology and he verbalized understanding.

## 2023-03-10 NOTE — ED PROVIDER NOTE - PROGRESS NOTE DETAILS
labs - IVETTE (given IV fluids bolus in ED)  Pain improved  Will dc with pain meds for thoracic contusion, miralax to avoid constipation after opiates, and fu with Dr Ramos for repeat echo as out pt. Of note, pt is not sure he would even want surgery if repeat echo shows critical AS.   Discussed indications for patient return to ED. Patient understood.

## 2023-03-13 ENCOUNTER — APPOINTMENT (OUTPATIENT)
Dept: HEART AND VASCULAR | Facility: CLINIC | Age: 83
End: 2023-03-13
Payer: MEDICARE

## 2023-03-13 VITALS — SYSTOLIC BLOOD PRESSURE: 109 MMHG | OXYGEN SATURATION: 97 % | DIASTOLIC BLOOD PRESSURE: 75 MMHG | HEART RATE: 83 BPM

## 2023-03-13 VITALS — DIASTOLIC BLOOD PRESSURE: 69 MMHG | SYSTOLIC BLOOD PRESSURE: 109 MMHG

## 2023-03-13 DIAGNOSIS — I12.9 HYPERTENSIVE CHRONIC KIDNEY DISEASE WITH STAGE 1 THROUGH STAGE 4 CHRONIC KIDNEY DISEASE, OR UNSPECIFIED CHRONIC KIDNEY DISEASE: ICD-10-CM

## 2023-03-13 DIAGNOSIS — N18.9 CHRONIC KIDNEY DISEASE, UNSPECIFIED: ICD-10-CM

## 2023-03-13 DIAGNOSIS — R55 SYNCOPE AND COLLAPSE: ICD-10-CM

## 2023-03-13 DIAGNOSIS — S20.20XA CONTUSION OF THORAX, UNSPECIFIED, INITIAL ENCOUNTER: ICD-10-CM

## 2023-03-13 DIAGNOSIS — R01.1 CARDIAC MURMUR, UNSPECIFIED: ICD-10-CM
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
## 2023-03-13 DIAGNOSIS — Z91.013 ALLERGY TO SEAFOOD: ICD-10-CM
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
## 2023-03-13 DIAGNOSIS — M25.552 PAIN IN LEFT HIP: ICD-10-CM

## 2023-03-13 DIAGNOSIS — Z87.448 PERSONAL HISTORY OF OTHER DISEASES OF URINARY SYSTEM: ICD-10-CM

## 2023-03-13 DIAGNOSIS — Z86.718 PERSONAL HISTORY OF OTHER VENOUS THROMBOSIS AND EMBOLISM: ICD-10-CM

## 2023-03-13 DIAGNOSIS — I35.0 NONRHEUMATIC AORTIC (VALVE) STENOSIS: ICD-10-CM

## 2023-03-13 DIAGNOSIS — K58.2 MIXED IRRITABLE BOWEL SYNDROME: ICD-10-CM

## 2023-03-13 DIAGNOSIS — N17.9 ACUTE KIDNEY FAILURE, UNSPECIFIED: ICD-10-CM

## 2023-03-13 DIAGNOSIS — Z91.81 HISTORY OF FALLING: ICD-10-CM

## 2023-03-13 PROCEDURE — 99213 OFFICE O/P EST LOW 20 MIN: CPT

## 2023-03-13 NOTE — REASON FOR VISIT
[Symptom and Test Evaluation] : symptom and test evaluation [FreeTextEntry1] : 83 yo man with a hx of IBS, BPH, CKD stage III, HTN and s/p fall last week. He was in [facility] and admitted s/p fall but states he left AMA. He fell in the in the middle of the night when he got up too fast and became dizzy. He states he lost consciousness and went to the ER the next day. He fell on his left side which is now tender to touch. He was seen by EP and recommends [facility].  Echo was done while in patient which revealed aortic stenotic. His main complaint is severe abdominal pain 2/2 IBS and ? hiccup.

## 2023-03-13 NOTE — REVIEW OF SYSTEMS
[phone] [Negative] : Heme/Lymph [Feeling Fatigued] : feeling fatigued [Syncope] : syncope [Abdominal Pain] : abdominal pain [Dizziness] : dizziness

## 2023-03-13 NOTE — DISCUSSION/SUMMARY
[FreeTextEntry1] : Syncope/murmur/AS \par -patient seen and examined, chart reviewed\par -hospital records reviewed\par -happy to schedule for an echocardiogram to further evaluate the extent of AS, however, patient demanded an immediate echo and [facility] are [facility], patient became agree and noted that "this was not going to work up" and departed shortly after

## 2023-03-13 NOTE — PHYSICAL EXAM
[Well Developed] : well developed [No Acute Distress] : no acute distress [Normal Conjunctiva] : normal conjunctiva [Normal Venous Pressure] : normal venous pressure [No Carotid Bruit] : no carotid bruit [Normal S1, S2] : normal S1, S2 [No Murmur] : no murmur [No Rub] : no rub [No Gallop] : no gallop [Clear Lung Fields] : clear lung fields [Good Air Entry] : good air entry [No Respiratory Distress] : no respiratory distress  [Soft] : abdomen soft [Non Tender] : non-tender [Normal Bowel Sounds] : normal bowel sounds [Normal Gait] : normal gait [No Edema] : no edema [No Cyanosis] : no cyanosis [No Clubbing] : no clubbing [No Varicosities] : no varicosities [No Rash] : no rash [No Skin Lesions] : no skin lesions [Moves all extremities] : moves all extremities [No Focal Deficits] : no focal deficits [Normal Speech] : normal speech [Alert and Oriented] : alert and oriented [Normal memory] : normal memory [Frail] : frail

## 2023-03-15 DIAGNOSIS — R55 SYNCOPE AND COLLAPSE: ICD-10-CM

## 2023-03-15 DIAGNOSIS — R00.1 BRADYCARDIA, UNSPECIFIED: ICD-10-CM

## 2023-03-15 DIAGNOSIS — R29.6 REPEATED FALLS: ICD-10-CM

## 2023-03-15 DIAGNOSIS — N18.30 CHRONIC KIDNEY DISEASE, STAGE 3 UNSPECIFIED: ICD-10-CM

## 2023-03-15 DIAGNOSIS — I95.1 ORTHOSTATIC HYPOTENSION: ICD-10-CM

## 2023-03-15 DIAGNOSIS — E86.0 DEHYDRATION: ICD-10-CM

## 2023-03-15 DIAGNOSIS — I12.9 HYPERTENSIVE CHRONIC KIDNEY DISEASE WITH STAGE 1 THROUGH STAGE 4 CHRONIC KIDNEY DISEASE, OR UNSPECIFIED CHRONIC KIDNEY DISEASE: ICD-10-CM

## 2023-03-15 DIAGNOSIS — E43 UNSPECIFIED SEVERE PROTEIN-CALORIE MALNUTRITION: ICD-10-CM

## 2023-03-15 DIAGNOSIS — K57.90 DIVERTICULOSIS OF INTESTINE, PART UNSPECIFIED, WITHOUT PERFORATION OR ABSCESS WITHOUT BLEEDING: ICD-10-CM

## 2023-03-15 DIAGNOSIS — K58.9 IRRITABLE BOWEL SYNDROME WITHOUT DIARRHEA: ICD-10-CM

## 2023-03-15 DIAGNOSIS — N40.0 BENIGN PROSTATIC HYPERPLASIA WITHOUT LOWER URINARY TRACT SYMPTOMS: ICD-10-CM

## 2023-03-15 DIAGNOSIS — M10.9 GOUT, UNSPECIFIED: ICD-10-CM

## 2023-03-20 NOTE — ED ADULT NURSE NOTE - TEMPLATE
Chief Complaint  Hypertension (Follow up )    Subjective          Ibis Moss presents to [facility] for   Hypertension  This is a chronic problem. The current episode started more than 1 year ago. The problem is controlled. Pertinent negatives include no anxiety, chest pain, malaise/fatigue, peripheral edema or shortness of breath. Risk factors for coronary artery disease include family history and dyslipidemia. Past treatments include ACE inhibitors and calcium channel blockers. Current antihypertension treatment includes ACE inhibitors and calcium channel blockers. The current treatment provides moderate improvement. There are no compliance problems.    Back Pain  This is a recurrent (Seen rheumatology Lupus work up pending) problem. The current episode started 1 to 4 weeks ago. The problem occurs daily. The problem has been waxing and waning since onset. The pain is present in the lumbar spine. The quality of the pain is described as aching. The pain does not radiate. The pain is worse during the day. The symptoms are aggravated by bending and twisting. Pertinent negatives include no bladder incontinence, bowel incontinence or chest pain. Risk factors include history of osteoporosis. She has tried nothing for the symptoms.   Hyperlipidemia  This is a chronic problem. The current episode started more than 1 year ago. Pertinent negatives include no chest pain or shortness of breath. The current treatment provides moderate improvement of lipids. There are no compliance problems.    Depression  Visit Type: follow-up (Feels she is stable with her mood)  Patient presents with the following symptoms: insomnia.  Patient is not experiencing: shortness of breath.    Insomnia  This is a chronic problem. The current episode started more than 1 year ago. Progression since onset: Feels she is doing better at present. Pertinent negatives include no chest pain. The treatment provided  "moderate relief.        Objective   Vital Signs:   /70 (BP Location: Right arm, Patient Position: Sitting)   Pulse 81   Temp 97.7 °F (36.5 °C) (Temporal)   Ht 152.4 cm (60\")   Wt 64.3 kg (141 lb 12.8 oz)   SpO2 94%   BMI 27.69 kg/m²     Physical Exam  Vitals and nursing note reviewed.   Constitutional:       General: She is not in acute distress.     Appearance: She is well-developed and normal weight. She is not ill-appearing.   HENT:      Head: Normocephalic.   Eyes:      Conjunctiva/sclera: Conjunctivae normal.   Cardiovascular:      Rate and Rhythm: Normal rate and regular rhythm.      Heart sounds: Normal heart sounds.   Pulmonary:      Effort: Pulmonary effort is normal.      Breath sounds: Normal breath sounds.   Musculoskeletal:      Cervical back: Normal range of motion and neck supple.      Right lower leg: No edema.      Left lower leg: No edema.   Skin:     General: Skin is warm and dry.   Neurological:      Mental Status: She is alert and oriented to person, place, and time.   Psychiatric:         Behavior: Behavior normal.         Thought Content: Thought content normal.         Judgment: Judgment normal.        During this visit the following were done:  Labs Reviewed [x]    Labs Ordered [x]    Radiology Reports Reviewed []    Radiology Ordered []    PCP Records Reviewed []    Referring Provider Records Reviewed []    ER Records Reviewed []    Hospital Records Reviewed []    History Obtained From Family []    Radiology Images Reviewed []    Other Reviewed []    Records Requested []      Diagnoses and all orders for this visit:    1. Hyperlipidemia, unspecified hyperlipidemia type (Primary)  -     Comprehensive Metabolic Panel; Future  -     Lipid Panel; Future  -     Comprehensive Metabolic Panel  -     Lipid Panel    2. Age-related osteoporosis without current pathological fracture  -     alendronate (FOSAMAX) 70 MG tablet; Take 1 tablet by mouth Every 7 (Seven) Days.  Dispense: 12 " tablet; Refill: 3    3. Essential hypertension  -     amLODIPine (NORVASC) 10 MG tablet; Take 1 tablet by mouth Daily.  Dispense: 90 tablet; Refill: 1  -     lisinopril (PRINIVIL,ZESTRIL) 20 MG tablet; Take 1 tablet by mouth Daily.  Dispense: 90 tablet; Refill: 1  -     Comprehensive Metabolic Panel; Future  -     Lipid Panel; Future  -     TSH; Future  -     Vitamin B12; Future  -     Comprehensive Metabolic Panel  -     Lipid Panel  -     TSH  -     Vitamin B12    4. Polyarthralgia  -     meloxicam (MOBIC) 15 MG tablet; Take 1 tablet by mouth Daily.  Dispense: 90 tablet; Refill: 1    5. Primary insomnia  -     traZODone (DESYREL) 150 MG tablet; Take 1 tablet by mouth Every Night.  Dispense: 90 tablet; Refill: 1    6. Hyperglycemia  -     Hemoglobin A1c; Future  -     Hemoglobin A1c    7. Vitamin D deficiency  -     Vitamin D,25-Hydroxy; Future  -     Vitamin D,25-Hydroxy                     Follow Up   Return in about 6 months (around 9/20/2023), or if symptoms worsen or fail to improve, for Recheck labs today.  Patient was given instructions and counseling regarding her condition or for health maintenance advice. Please see specific information pulled into the AVS if appropriate.         Symptoms

## 2023-03-28 NOTE — ED ADULT NURSE NOTE - NSFALLRSKINDICATORS_ED_ALL_ED
Pt called back to reschedule her appt per the voicemail she received  She advised [place] and [place] offices would be closer for her  I offered her 5-9-23 at 65 am with Jenni Muller in [place] and she accepted  no

## 2023-04-07 ENCOUNTER — APPOINTMENT (OUTPATIENT)
Dept: HEART AND VASCULAR | Facility: CLINIC | Age: 83
End: 2023-04-07

## 2023-05-05 NOTE — ED ADULT TRIAGE NOTE - CHIEF COMPLAINT QUOTE
states he had a syncopal episode last night and fell onto his left side, pt with sob and dizziness. Pt c/o pain to left kidney, Pt is A+Ox3 denies CP
Donovan TERAN,

## 2023-09-03 ENCOUNTER — EMERGENCY (EMERGENCY)
Facility: HOSPITAL | Age: 83
LOS: 1 days | Discharge: HOME | End: 2023-09-03
Admitting: EMERGENCY MEDICINE
Payer: MEDICARE

## 2023-09-03 VITALS
RESPIRATION RATE: 18 BRPM | TEMPERATURE: 98 F | OXYGEN SATURATION: 98 % | HEIGHT: 70 IN | SYSTOLIC BLOOD PRESSURE: 159 MMHG | WEIGHT: 134.92 LBS | DIASTOLIC BLOOD PRESSURE: 103 MMHG | HEART RATE: 68 BPM

## 2023-09-03 VITALS
SYSTOLIC BLOOD PRESSURE: 147 MMHG | OXYGEN SATURATION: 99 % | HEART RATE: 65 BPM | TEMPERATURE: 98 F | DIASTOLIC BLOOD PRESSURE: 91 MMHG | RESPIRATION RATE: 16 BRPM

## 2023-09-03 DIAGNOSIS — N40.0 BENIGN PROSTATIC HYPERPLASIA WITHOUT LOWER URINARY TRACT SYMPTOMS: ICD-10-CM

## 2023-09-03 DIAGNOSIS — Z98.890 OTHER SPECIFIED POSTPROCEDURAL STATES: Chronic | ICD-10-CM

## 2023-09-03 DIAGNOSIS — Z90.89 ACQUIRED ABSENCE OF OTHER ORGANS: Chronic | ICD-10-CM

## 2023-09-03 DIAGNOSIS — Z79.01 LONG TERM (CURRENT) USE OF ANTICOAGULANTS: ICD-10-CM

## 2023-09-03 DIAGNOSIS — N18.30 CHRONIC KIDNEY DISEASE, STAGE 3 UNSPECIFIED: ICD-10-CM

## 2023-09-03 DIAGNOSIS — R10.9 UNSPECIFIED ABDOMINAL PAIN: ICD-10-CM

## 2023-09-03 DIAGNOSIS — Z90.49 ACQUIRED ABSENCE OF OTHER SPECIFIED PARTS OF DIGESTIVE TRACT: Chronic | ICD-10-CM

## 2023-09-03 DIAGNOSIS — K59.00 CONSTIPATION, UNSPECIFIED: ICD-10-CM

## 2023-09-03 DIAGNOSIS — I13.10 HYPERTENSIVE HEART AND CHRONIC KIDNEY DISEASE WITHOUT HEART FAILURE, WITH STAGE 1 THROUGH STAGE 4 CHRONIC KIDNEY DISEASE, OR UNSPECIFIED CHRONIC KIDNEY DISEASE: ICD-10-CM

## 2023-09-03 DIAGNOSIS — Z86.718 PERSONAL HISTORY OF OTHER VENOUS THROMBOSIS AND EMBOLISM: ICD-10-CM

## 2023-09-03 DIAGNOSIS — K52.9 NONINFECTIVE GASTROENTERITIS AND COLITIS, UNSPECIFIED: ICD-10-CM

## 2023-09-03 DIAGNOSIS — Z91.013 ALLERGY TO SEAFOOD: ICD-10-CM

## 2023-09-03 LAB
ALBUMIN SERPL ELPH-MCNC: 3.1 G/DL — LOW (ref 3.4–5)
ALP SERPL-CCNC: 156 U/L — HIGH (ref 40–120)
ALT FLD-CCNC: 70 U/L — HIGH (ref 12–42)
ANION GAP SERPL CALC-SCNC: 5 MMOL/L — LOW (ref 9–16)
APTT BLD: 29.6 SEC — SIGNIFICANT CHANGE UP (ref 24.5–35.6)
AST SERPL-CCNC: 45 U/L — HIGH (ref 15–37)
BASOPHILS # BLD AUTO: 0.03 K/UL — SIGNIFICANT CHANGE UP (ref 0–0.2)
BASOPHILS NFR BLD AUTO: 0.6 % — SIGNIFICANT CHANGE UP (ref 0–2)
BILIRUB SERPL-MCNC: 0.5 MG/DL — SIGNIFICANT CHANGE UP (ref 0.2–1.2)
BUN SERPL-MCNC: 13 MG/DL — SIGNIFICANT CHANGE UP (ref 7–23)
CALCIUM SERPL-MCNC: 9 MG/DL — SIGNIFICANT CHANGE UP (ref 8.5–10.5)
CHLORIDE SERPL-SCNC: 100 MMOL/L — SIGNIFICANT CHANGE UP (ref 96–108)
CO2 SERPL-SCNC: 29 MMOL/L — SIGNIFICANT CHANGE UP (ref 22–31)
CREAT SERPL-MCNC: 1.11 MG/DL — SIGNIFICANT CHANGE UP (ref 0.5–1.3)
EGFR: 66 ML/MIN/1.73M2 — SIGNIFICANT CHANGE UP
EOSINOPHIL # BLD AUTO: 0.37 K/UL — SIGNIFICANT CHANGE UP (ref 0–0.5)
EOSINOPHIL NFR BLD AUTO: 7.4 % — HIGH (ref 0–6)
GLUCOSE SERPL-MCNC: 108 MG/DL — HIGH (ref 70–99)
HCT VFR BLD CALC: 39.8 % — SIGNIFICANT CHANGE UP (ref 39–50)
HGB BLD-MCNC: 13.2 G/DL — SIGNIFICANT CHANGE UP (ref 13–17)
IMM GRANULOCYTES NFR BLD AUTO: 0.8 % — SIGNIFICANT CHANGE UP (ref 0–0.9)
INR BLD: 0.96 — SIGNIFICANT CHANGE UP (ref 0.85–1.18)
LYMPHOCYTES # BLD AUTO: 1.57 K/UL — SIGNIFICANT CHANGE UP (ref 1–3.3)
LYMPHOCYTES # BLD AUTO: 31.6 % — SIGNIFICANT CHANGE UP (ref 13–44)
MCHC RBC-ENTMCNC: 33 PG — SIGNIFICANT CHANGE UP (ref 27–34)
MCHC RBC-ENTMCNC: 33.2 GM/DL — SIGNIFICANT CHANGE UP (ref 32–36)
MCV RBC AUTO: 99.5 FL — SIGNIFICANT CHANGE UP (ref 80–100)
MONOCYTES # BLD AUTO: 0.31 K/UL — SIGNIFICANT CHANGE UP (ref 0–0.9)
MONOCYTES NFR BLD AUTO: 6.2 % — SIGNIFICANT CHANGE UP (ref 2–14)
NEUTROPHILS # BLD AUTO: 2.65 K/UL — SIGNIFICANT CHANGE UP (ref 1.8–7.4)
NEUTROPHILS NFR BLD AUTO: 53.4 % — SIGNIFICANT CHANGE UP (ref 43–77)
NRBC # BLD: 0 /100 WBCS — SIGNIFICANT CHANGE UP (ref 0–0)
PLATELET # BLD AUTO: 201 K/UL — SIGNIFICANT CHANGE UP (ref 150–400)
POTASSIUM SERPL-MCNC: 4.1 MMOL/L — SIGNIFICANT CHANGE UP (ref 3.5–5.3)
POTASSIUM SERPL-SCNC: 4.1 MMOL/L — SIGNIFICANT CHANGE UP (ref 3.5–5.3)
PROT SERPL-MCNC: 6.4 G/DL — SIGNIFICANT CHANGE UP (ref 6.4–8.2)
PROTHROM AB SERPL-ACNC: 10.9 SEC — SIGNIFICANT CHANGE UP (ref 9.5–13)
RBC # BLD: 4 M/UL — LOW (ref 4.2–5.8)
RBC # FLD: 13 % — SIGNIFICANT CHANGE UP (ref 10.3–14.5)
SODIUM SERPL-SCNC: 134 MMOL/L — SIGNIFICANT CHANGE UP (ref 132–145)
TROPONIN I, HIGH SENSITIVITY RESULT: <4 NG/L — SIGNIFICANT CHANGE UP
WBC # BLD: 4.97 K/UL — SIGNIFICANT CHANGE UP (ref 3.8–10.5)
WBC # FLD AUTO: 4.97 K/UL — SIGNIFICANT CHANGE UP (ref 3.8–10.5)

## 2023-09-03 PROCEDURE — 99284 EMERGENCY DEPT VISIT MOD MDM: CPT

## 2023-09-03 PROCEDURE — 74176 CT ABD & PELVIS W/O CONTRAST: CPT | Mod: 26

## 2023-09-03 RX ORDER — POLYETHYLENE GLYCOL 3350 17 G/17G
17 POWDER, FOR SOLUTION ORAL ONCE
Refills: 0 | Status: COMPLETED | OUTPATIENT
Start: 2023-09-03 | End: 2023-09-03

## 2023-09-03 RX ORDER — MULTIVIT WITH MIN/MFOLATE/K2 340-15/3 G
296 POWDER (GRAM) ORAL ONCE
Refills: 0 | Status: COMPLETED | OUTPATIENT
Start: 2023-09-03 | End: 2023-09-03

## 2023-09-03 RX ADMIN — POLYETHYLENE GLYCOL 3350 17 GRAM(S): 17 POWDER, FOR SOLUTION ORAL at 12:51

## 2023-09-03 NOTE — ED PROVIDER NOTE - PATIENT PORTAL LINK FT
You can access the FollowMyHealth Patient Portal offered by [facility] by registering at the following website: [email or web address]. By joining [employer]’s FollowMyHealth portal, you will also be able to view your health information using other applications (apps) compatible with our system.

## 2023-09-03 NOTE — ED PROVIDER NOTE - GASTROINTESTINAL [+], MLM
CARDIODIAGNOSTIC PRELIMINARY REPORT:     Guero protocol completed for 10:33 minutes with no new EKG changes noted; no arrhythmias     Denied cardiac symptoms    Base:  124/86, HR 69;  Peak:  184/94  Second set of images pending ABDOMINAL PAIN

## 2023-09-03 NOTE — ED PROVIDER NOTE - OBJECTIVE STATEMENT
83 y/o Male with PMHx of HTN, BPH, DVT, and  CKD presenting with abdominal pain and inability to have a bowel movement for the past 5 days. Patient states that he hasn't had a bowel movement since 8/28. Patient states that he has passed gas recently. Denies similar prior episodes. Denies fever/chills, weakness, and numbness.

## 2023-09-03 NOTE — ED ADULT NURSE NOTE - NSFALLUNIVINTERV_ED_ALL_ED
Bed/Stretcher in lowest position, wheels locked, appropriate side rails in place/Call bell, personal items and telephone in reach/Instruct patient to call for assistance before getting out of bed/chair/stretcher/Non-slip footwear applied when patient is off stretcher/[place] to call system/Physically safe environment - no spills, clutter or unnecessary equipment/Purposeful proactive rounding/Room/bathroom lighting operational, light cord in reach

## 2023-09-03 NOTE — ED PROVIDER NOTE - CLINICAL SUMMARY MEDICAL DECISION MAKING FREE TEXT BOX
81 y/o Male with PMHx of HTN, BPH, DVT, and CKD presenting with abdominal pain and inability to have bowel movement for the past 5 days. Plan to obtain labs, CT abdomen and pelvis, give citroma, give Miralax, and reassess. Patient is well appearing and vibrant in ED.

## 2023-09-06 NOTE — ED PROVIDER NOTE - NSDCPRINTRESULTS_ED_ALL_ED
Forwarded to DR Ferrera.    Patient requests all Lab, Cardiology, and Radiology Results on their Discharge Instructions

## 2023-09-06 NOTE — ED ADULT TRIAGE NOTE - HEIGHT IN INCHES
[11

## 2023-09-08 NOTE — ED PROVIDER NOTE - PRINCIPAL DIAGNOSIS
Diarrhea Z Plasty Text: The lesion was extirpated to the level of the fat with a #15 scalpel blade.  Given the location of the defect, shape of the defect and the proximity to free margins a Z-plasty was deemed most appropriate for repair.  Using a sterile surgical marker, the appropriate transposition arms of the Z-plasty were drawn incorporating the defect and placing the expected incisions within the relaxed skin tension lines where possible.    The area thus outlined was incised deep to adipose tissue with a #15 scalpel blade.  The skin margins were undermined to an appropriate distance in all directions utilizing iris scissors.  The opposing transposition arms were then transposed into place in opposite direction and anchored with interrupted buried subcutaneous sutures.

## 2023-10-09 NOTE — ED ADULT NURSE NOTE - NSFALLRSKASSESSDT_ED_ALL_ED
Called patient reminding OLIVEIRA appointment on 10-10-23 at 430 pm. Confirmed appointment with patient.     Jessica Clinton on 10/9/2023 at 9:50 AM     17-Jul-2022 12:46

## 2023-11-14 ENCOUNTER — APPOINTMENT (OUTPATIENT)
Age: 83
End: 2023-11-14
Payer: MEDICARE

## 2023-11-14 VITALS
RESPIRATION RATE: 16 BRPM | DIASTOLIC BLOOD PRESSURE: 86 MMHG | BODY MASS INDEX: 20.07 KG/M2 | WEIGHT: 140.2 LBS | HEIGHT: 70 IN | TEMPERATURE: 98.5 F | HEART RATE: 62 BPM | SYSTOLIC BLOOD PRESSURE: 136 MMHG | OXYGEN SATURATION: 98 %

## 2023-11-14 DIAGNOSIS — Z87.891 PERSONAL HISTORY OF NICOTINE DEPENDENCE: ICD-10-CM

## 2023-11-14 DIAGNOSIS — K59.00 CONSTIPATION, UNSPECIFIED: ICD-10-CM

## 2023-11-14 DIAGNOSIS — R74.8 ABNORMAL LEVELS OF OTHER SERUM ENZYMES: ICD-10-CM

## 2023-11-14 PROCEDURE — 99204 OFFICE O/P NEW MOD 45 MIN: CPT

## 2023-11-14 RX ORDER — POLYETHYLENE GLYCOL 3350 17 G/17G
POWDER, FOR SOLUTION ORAL
Refills: 0 | Status: ACTIVE | COMMUNITY

## 2023-11-14 RX ORDER — ALLOPURINOL 100 MG/1
100 TABLET ORAL
Refills: 0 | Status: ACTIVE | COMMUNITY

## 2023-11-14 RX ORDER — TAMSULOSIN HYDROCHLORIDE 0.4 MG/1
0.4 CAPSULE ORAL
Refills: 0 | Status: ACTIVE | COMMUNITY

## 2023-11-14 RX ORDER — ENALAPRIL MALEATE 5 MG/1
5 TABLET ORAL
Refills: 0 | Status: ACTIVE | COMMUNITY

## 2023-11-14 RX ORDER — HYDROCORTISONE 25 MG/G
2.5 CREAM TOPICAL
Refills: 0 | Status: ACTIVE | COMMUNITY

## 2023-11-14 RX ORDER — CYANOCOBALAMIN (VITAMIN B-12) 1000 MCG
1000 TABLET ORAL
Refills: 0 | Status: ACTIVE | COMMUNITY

## 2023-11-16 LAB
ALP BLD-CCNC: 104 U/L
ALT SERPL-CCNC: 16 U/L
AST SERPL-CCNC: 18 U/L
BILIRUB SERPL-MCNC: 0.6 MG/DL
HBV CORE IGG+IGM SER QL: NONREACTIVE
HBV SURFACE AB SER QL: NONREACTIVE
HBV SURFACE AG SER QL: NONREACTIVE
HCV AB SER QL: NONREACTIVE
HCV S/CO RATIO: 0.07 S/CO
HEPATITIS A IGG ANTIBODY: REACTIVE

## 2023-12-16 ENCOUNTER — EMERGENCY (EMERGENCY)
Facility: HOSPITAL | Age: 83
LOS: 1 days | Discharge: HOME | End: 2023-12-16
Attending: EMERGENCY MEDICINE | Admitting: EMERGENCY MEDICINE
Payer: MEDICARE

## 2023-12-16 VITALS
HEIGHT: 70 IN | TEMPERATURE: 97 F | WEIGHT: 134.92 LBS | OXYGEN SATURATION: 97 % | HEART RATE: 74 BPM | SYSTOLIC BLOOD PRESSURE: 124 MMHG | RESPIRATION RATE: 16 BRPM | DIASTOLIC BLOOD PRESSURE: 79 MMHG

## 2023-12-16 DIAGNOSIS — Z90.89 ACQUIRED ABSENCE OF OTHER ORGANS: Chronic | ICD-10-CM

## 2023-12-16 DIAGNOSIS — Z98.890 OTHER SPECIFIED POSTPROCEDURAL STATES: Chronic | ICD-10-CM

## 2023-12-16 DIAGNOSIS — Z90.49 ACQUIRED ABSENCE OF OTHER SPECIFIED PARTS OF DIGESTIVE TRACT: Chronic | ICD-10-CM

## 2023-12-16 LAB
APPEARANCE UR: CLEAR — SIGNIFICANT CHANGE UP
APPEARANCE UR: CLEAR — SIGNIFICANT CHANGE UP
BACTERIA # UR AUTO: PRESENT /HPF — SIGNIFICANT CHANGE UP
BACTERIA # UR AUTO: PRESENT /HPF — SIGNIFICANT CHANGE UP
BILIRUB UR-MCNC: NEGATIVE — SIGNIFICANT CHANGE UP
BILIRUB UR-MCNC: NEGATIVE — SIGNIFICANT CHANGE UP
COLOR SPEC: YELLOW — SIGNIFICANT CHANGE UP
COLOR SPEC: YELLOW — SIGNIFICANT CHANGE UP
COMMENT - URINE: SIGNIFICANT CHANGE UP
COMMENT - URINE: SIGNIFICANT CHANGE UP
DIFF PNL FLD: ABNORMAL
DIFF PNL FLD: ABNORMAL
EPI CELLS # UR: PRESENT
EPI CELLS # UR: PRESENT
GLUCOSE UR QL: NEGATIVE MG/DL — SIGNIFICANT CHANGE UP
GLUCOSE UR QL: NEGATIVE MG/DL — SIGNIFICANT CHANGE UP
HYALINE CASTS # UR AUTO: PRESENT
HYALINE CASTS # UR AUTO: PRESENT
KETONES UR-MCNC: ABNORMAL MG/DL
KETONES UR-MCNC: ABNORMAL MG/DL
LEUKOCYTE ESTERASE UR-ACNC: ABNORMAL
LEUKOCYTE ESTERASE UR-ACNC: ABNORMAL
NITRITE UR-MCNC: NEGATIVE — SIGNIFICANT CHANGE UP
NITRITE UR-MCNC: NEGATIVE — SIGNIFICANT CHANGE UP
PH UR: 7.5 — SIGNIFICANT CHANGE UP (ref 5–8)
PH UR: 7.5 — SIGNIFICANT CHANGE UP (ref 5–8)
PROT UR-MCNC: 30 MG/DL
PROT UR-MCNC: 30 MG/DL
RBC CASTS # UR COMP ASSIST: 5 /HPF — HIGH (ref 0–4)
RBC CASTS # UR COMP ASSIST: 5 /HPF — HIGH (ref 0–4)
SP GR SPEC: 1.03 — SIGNIFICANT CHANGE UP (ref 1–1.03)
SP GR SPEC: 1.03 — SIGNIFICANT CHANGE UP (ref 1–1.03)
UROBILINOGEN FLD QL: 1 MG/DL — SIGNIFICANT CHANGE UP (ref 0.2–1)
UROBILINOGEN FLD QL: 1 MG/DL — SIGNIFICANT CHANGE UP (ref 0.2–1)
WBC UR QL: SIGNIFICANT CHANGE UP /HPF (ref 0–5)
WBC UR QL: SIGNIFICANT CHANGE UP /HPF (ref 0–5)

## 2023-12-16 PROCEDURE — 74176 CT ABD & PELVIS W/O CONTRAST: CPT | Mod: 26

## 2023-12-16 PROCEDURE — 99284 EMERGENCY DEPT VISIT MOD MDM: CPT

## 2023-12-16 NOTE — ED PROVIDER NOTE - NSFOLLOWUPINSTRUCTIONS_ED_ALL_ED_FT
Thank you for letting us take care of you at [facility] emergency department.  You presented to the emergency department with urinary retention.  A Sanchez catheter was placed attached to a leg bag.  Please leave the Sanchez catheter in place until you see your allergist.  Draining the leg bag accordingly.  Return to nearest emergency department with any further concerns. Thank you for letting us take care of you at [facility] emergency department.  You presented to the emergency department with urinary retention.  A Sanchez catheter was placed attached to a leg bag.  Please leave the Sanchez catheter in place until you see your allergist.  Draining the leg bag accordingly.  Return to nearest emergency department with any further concerns.

## 2023-12-16 NOTE — ED PROVIDER NOTE - PATIENT PORTAL LINK FT
You can access the FollowMyHealth Patient Portal offered by [facility] by registering at the following website: [email or web address]. By joining [employer]’s FollowMyHealth portal, you will also be able to view your health information using other applications (apps) compatible with our system. You can access the FollowMyHealth Patient Portal offered by [facility] by registering at the following website: [email or web address]. By joining [employer]’s FollowMyHealth portal, you will also be able to view your health information using other applications (apps) compatible with our system.

## 2023-12-16 NOTE — ED PROVIDER NOTE - OBJECTIVE STATEMENT
This patient is an 83-year-old male, history of BPH, and hemorrhoids.  Patient presents to the emergency department with urinary retention since 10:00 this morning.  The patient states that this occurs to him occasionally and he can relax and pump on his bladder with his fingers and usually he can urinate.  At today it has been all day and he has not been able to urinate.  Patient on Flomax 3 times daily.  He denies fever, no dysuria, just urgency and retention.

## 2023-12-16 NOTE — ED PROVIDER NOTE - CLINICAL SUMMARY MEDICAL DECISION MAKING FREE TEXT BOX
This patient is an 83-year-old male, history of enlarged prostate, hemorrhoids, and more.  Patient presents with multiple hours of urinary retention.  Patient with no fever.  Differential diagnosis includes enlarged prostate, UTI, obstruction, renal colic.  Differential diagnosis could potentially include life-threatening illness.  Hospitalization considered for this patient.  The following tests were ordered for this patient.  Urinalysis  CAT scan of the kidneys  Patient refusing Sanchez catheter initially.  Attempt to straight cath patient.  Unable to pass.  Cudet 18 and 20 attempted unsuccessfully.  Finally Sanchez placed, minimal amount of urine drainage with some blood.  Patient pending CT stone study. This patient is an 83-year-old male, history of enlarged prostate, hemorrhoids, and more.  Patient presents with multiple hours of urinary retention.  Patient with no fever.  Differential diagnosis includes enlarged prostate, UTI, obstruction, renal colic.  Differential diagnosis could potentially include life-threatening illness.  Hospitalization considered for this patient.  The following tests were ordered for this patient.  Urinalysis  CAT scan of the kidneys  Patient refusing Sanchez catheter initially.  Attempt to straight cath patient.  Unable to pass.  Cudet 18 and 20 attempted unsuccessfully.  Finally Sanchez placed, minimal amount of urine drainage with some blood.  Patient pending CT stone study.  CT scan of the pelvis, shows no significant pathology.  Patient to be discharged with Sanchez to leg bag and follow-up with urology on Monday.

## 2023-12-16 NOTE — ED ADULT NURSE NOTE - CHIEF COMPLAINT
The patient is a 83y Male complaining of urinary retention.
How Severe Is Your Rash?: moderate
Is This A New Presentation, Or A Follow-Up?: Rash

## 2023-12-16 NOTE — ED ADULT NURSE NOTE - OBJECTIVE STATEMENT
84 y/o M here with bladder pressure and urinary retention x 5 hours. PMH BPH. A&Ox4. Ambulatory. Allergy to shrimp. Patient follows up with urologist.

## 2023-12-16 NOTE — ED PROVIDER NOTE - PHYSICAL EXAMINATION
Patient is awake and alert, no acute distress.  Vital signs within normal, afebrile, no hypoxemia.  HEENT: Intact.  Neck: Supple  Chest: No respiratory distress.  Abdomen: Soft, no distention, no tenderness.  : Unremarkable.  Neurological: Intact.

## 2023-12-16 NOTE — ED ADULT NURSE NOTE - NSFALLUNIVINTERV_ED_ALL_ED
Bed/Stretcher in lowest position, wheels locked, appropriate side rails in place/Call bell, personal items and telephone in reach/Instruct patient to call for assistance before getting out of bed/chair/stretcher/Non-slip footwear applied when patient is off stretcher/[place] to call system/Physically safe environment - no spills, clutter or unnecessary equipment/Purposeful proactive rounding/Room/bathroom lighting operational, light cord in reach Bed/Stretcher in lowest position, wheels locked, appropriate side rails in place/Call bell, personal items and telephone in reach/Instruct patient to call for assistance before getting out of bed/chair/stretcher/Non-slip footwear applied when patient is off stretcher/[place] to call system/Physically safe environment - no spills, clutter or unnecessary equipment/Purposeful proactive rounding/Room/bathroom lighting operational, light cord in reach

## 2023-12-16 NOTE — ED PROVIDER NOTE - NS ED ROS FT
Patient denies fever, no headache, no neck pain, no chest pain, no shortness of breath.  Nausea, no vomiting, no abdominal pain.

## 2023-12-17 ENCOUNTER — EMERGENCY (EMERGENCY)
Facility: HOSPITAL | Age: 83
LOS: 1 days | Discharge: HOME | End: 2023-12-17
Admitting: EMERGENCY MEDICINE
Payer: MEDICARE

## 2023-12-17 VITALS
OXYGEN SATURATION: 97 % | DIASTOLIC BLOOD PRESSURE: 71 MMHG | TEMPERATURE: 98 F | HEIGHT: 70 IN | RESPIRATION RATE: 16 BRPM | HEART RATE: 64 BPM | SYSTOLIC BLOOD PRESSURE: 103 MMHG

## 2023-12-17 DIAGNOSIS — Z98.890 OTHER SPECIFIED POSTPROCEDURAL STATES: Chronic | ICD-10-CM

## 2023-12-17 DIAGNOSIS — Z90.89 ACQUIRED ABSENCE OF OTHER ORGANS: Chronic | ICD-10-CM

## 2023-12-17 DIAGNOSIS — Z90.49 ACQUIRED ABSENCE OF OTHER SPECIFIED PARTS OF DIGESTIVE TRACT: Chronic | ICD-10-CM

## 2023-12-17 LAB
CULTURE RESULTS: NO GROWTH — SIGNIFICANT CHANGE UP
CULTURE RESULTS: NO GROWTH — SIGNIFICANT CHANGE UP
SPECIMEN SOURCE: SIGNIFICANT CHANGE UP
SPECIMEN SOURCE: SIGNIFICANT CHANGE UP

## 2023-12-17 PROCEDURE — 99283 EMERGENCY DEPT VISIT LOW MDM: CPT

## 2023-12-17 RX ORDER — VIBEGRON 75 MG/1
1 TABLET, FILM COATED ORAL
Qty: 30 | Refills: 0
Start: 2023-12-17 | End: 2024-01-15

## 2023-12-17 RX ORDER — PHENAZOPYRIDINE HCL 100 MG
200 TABLET ORAL ONCE
Refills: 0 | Status: COMPLETED | OUTPATIENT
Start: 2023-12-17 | End: 2023-12-17

## 2023-12-17 RX ADMIN — Medication 200 MILLIGRAM(S): at 02:20

## 2023-12-17 NOTE — ED PROVIDER NOTE - PATIENT PORTAL LINK FT
You can access the FollowMyHealth Patient Portal offered by [facility] by registering at the following website: [email or web address]. By joining [employer]’s FollowMyHealth portal, you will also be able to view your health information using other applications (apps) compatible with our system. You can access the FollowMyHealth Patient Portal offered by [facility] by registering at the following website: [email or web address]. By joining [employer]’s FollowMyHealth portal, you will also be able to view your health information using other applications (apps) compatible with our system.

## 2023-12-17 NOTE — ED PROVIDER NOTE - PHYSICAL EXAMINATION
VITAL SIGNS: I have reviewed nursing notes and confirm.  CONSTITUTIONAL: Well-developed; well-nourished; in no acute distress.  SKIN: Skin is warm and dry, no acute rash.  HEAD: Normocephalic; atraumatic.  NECK: Supple; non tender.  CARD: S1, S2 normal; no murmurs, gallops, or rubs. Regular rate and rhythm.  RESP: No wheezes, rales or rhonchi.  ABD: Soft; non-distended; non-tender; no rebound or guarding.  RECTAL: Small external hemorrhoid, enlarged non tender prostate; no distinct rectal ttp. *Chaperone, Seble, PCT*  EXT: Normal ROM. No clubbing, cyanosis or edema.  NEURO: Alert, oriented. Grossly unremarkable. BILLY, normal tone, no gross motor or sensory changes. Fluent speech.   PSYCH: Cooperative, appropriate. Mood and affect wnl. VITAL SIGNS: I have reviewed nursing notes and confirm.  CONSTITUTIONAL: Well-developed; well-nourished; in no acute distress.  SKIN: Skin is warm and dry, no acute rash.  HEAD: Normocephalic; atraumatic.  NECK: Supple; non tender.  CARD: S1, S2 normal; no murmurs, gallops, or rubs. Regular rate and rhythm.  RESP: No wheezes, rales or rhonchi.  ABD: Soft; non-distended; non-tender; no rebound or guarding. Sanchez noted in place.  RECTAL: Small external hemorrhoid, enlarged non tender prostate; no distinct rectal ttp. *Chaperone, Seble, PCT*  EXT: Normal ROM. No clubbing, cyanosis or edema.  NEURO: Alert, oriented. Grossly unremarkable. BILLY, normal tone, no gross motor or sensory changes. Fluent speech.   PSYCH: Cooperative, appropriate. Mood and affect wnl.

## 2023-12-17 NOTE — ED PROVIDER NOTE - NS ED ROS FT
+lower abdominal spasm  +rectal discomfort  Denies fevers, chills, nausea, vomiting, diarrhea, constipation, chest pain, palpitations, shortness of breath, dyspnea on exertion, syncope/near syncope, cough/URI symptoms, headache, weakness, numbness, focal deficits, visual changes, gait or balance changes, dizziness

## 2023-12-17 NOTE — ED PROVIDER NOTE - CLINICAL SUMMARY MEDICAL DECISION MAKING FREE TEXT BOX
83-year-old male, past medical history of BPH status post UroLift and hemorrhoids, presenting to the emergency room complaining of lower abdominal spasms and mild rectal discomfort for the past 2 hours. Patient's abdomen is not distinctively tender, nor does he have any abnormal rectal tenderness.  I clinically suspect patient's spasms are due to the newly placed Sanchez catheter from yesterday.  I discussed with the on-call urologist who recommends starting the patient on Vibegron for symptoms.  She recommends against oxybutynin due to the patient's age and increased risk for anticholinergic related dementia and severe constipation.  She also recommend the patient follow-up with the urologist he usually sees, however if he cannot be seen, he can follow-up at the [facility] urology clinic.  Patient is found to have dark urine in bag without evidence of blood.  Will prescribe via background and will give patient one-time dose of Pyridium in ED for acute symptomatic relief.  He is instructed to return to the ER for any worsening symptoms.  Patient stable as he leaves. 83-year-old male, past medical history of BPH status post UroLift and hemorrhoids, presenting to the emergency room complaining of lower abdominal spasms and mild rectal discomfort for the past 2 hours. Patient's abdomen is not distinctively tender, nor does he have any abnormal rectal tenderness.  I clinically suspect patient's spasms are due to the newly placed Sanchez catheter from yesterday.  I discussed with the on-call urologist who recommends starting the patient on Vibegron for symptoms.  She recommends against oxybutynin due to the patient's age and increased risk for anticholinergic related dementia and severe constipation.  She also recommend the patient follow-up with the urologist he usually sees, however if he cannot be seen, he can follow-up at the [facility] urology clinic.  Patient is found to have dark urine in bag without evidence of blood.  Will prescribe via background and will give patient one-time dose of Pyridium in ED for acute symptomatic relief.  He is instructed to return to the ER for any worsening symptoms.  Patient stable as he leaves.

## 2023-12-17 NOTE — ED PROVIDER NOTE - CONDITION AT DISCHARGE:
June 26, 2019      Lynn Villalba MD  [address]  [address]  [facility] [address]           [facility] - Pediatric Surgery  [address]  [place] [address]  Phone: [phone]  Fax: [phone]          Patient: Trisha Pineda   MR Number: [id number]   YOB: 2006   Date of Visit: 6/26/2019       Dear Dr. Lynn Villalba:    Thank you for referring Trisha Pineda to me for evaluation. Attached you will find relevant portions of my assessment and plan of care.    If you have questions, please do not hesitate to call me. I look forward to following Trisha Pineda along with you.    Sincerely,    Wallace Corbett MD    Enclosure  CC:  No Recipients    If you would like to receive this communication electronically, please contact [email or web address] or [phone] to request more information on [employer] Link access.    For providers and/or their staff who would like to refer a patient to [facility], please contact us through our one-stop-shop provider referral line, [facility], at [phone].    If you feel you have received this communication in error or would no longer like to receive these types of communications, please e-mail [email or web address]          Satisfactory

## 2023-12-17 NOTE — ED PROVIDER NOTE - OBJECTIVE STATEMENT
83-year-old male, past medical history of BPH status post UroLift and hemorrhoids, presenting to the emergency room complaining of lower abdominal spasms and mild rectal discomfort for the past 2 hours.  Patient was in the ED yesterday due to urinary retention that began in the morning.  CT imaging did not show any acute pathology however patient was discharged with a indwelling Sanchez and changed to a leg bag.  He reports going home, tolerating the Sanchez well, until later developing spasms in the lower abdomen.  He feels as though the discomfort radiates towards his rectum.  The sensation is not constant, however is noted to be reproduced with certain movements.  Patient denies any urinary symptoms, fevers, chills, nausea, vomiting, chest pain or shortness of breath.

## 2023-12-17 NOTE — ED ADULT NURSE NOTE - OBJECTIVE STATEMENT
84 y/o M here with bladder spasms after banks placement this afternoon. NKA. A&Ox4. Ambulatory. 82 y/o M here with bladder spasms after banks placement this afternoon. NKA. A&Ox4. Ambulatory.

## 2023-12-17 NOTE — ED PROVIDER NOTE - ED STEMI HIDDEN
[facility]  Daily Progress Note    Surgeon: Dr. Vanessa Lawler   S/P : CABG X 2   EF: 45%     Subjective:  Mr. Angel Atkinson   Resting on NC with no SOB. No acute distress. A&0x4    Physical Exam  Vital Signs: BP (!) 153/83   Pulse 81   Temp 98.2 °F (36.8 °C) (Oral)   Resp 21   Ht 6' 3\" (1.905 m)   Wt 171 lb 15.3 oz (78 kg)   SpO2 92%   BMI 21.49 kg/m²  O2 Flow Rate (L/min): 4.5 L/min   Admit Weight: Weight: 141 lb 5 oz (64.1 kg)   WEIGHTWeight: 171 lb 15.3 oz (78 kg)     General: noncompliant   Heart: Normal S1 and S2.  Regular rhythm. No murmurs, gallops, or rubs. Pacing Wires: Yes -To be clipped prior to discharge  Lungs: clear to auscultation bilaterally and diminished breath sounds bibasilar Chest tubes: Yes, Air Leak: No  Abdomen: soft, non tender, non distended, BS x4  Extremities: negative  Wounds: clean and dry, healing appropriately. Sternum: Healing  SVG sites: Healing  CXR-no appreciated effusions noted. Diffuse consolidation. No pneumothorax noted.      Scheduled Meds:    enoxaparin  40 mg SubCUTAneous Daily    furosemide  40 mg IntraVENous BID    potassium chloride  40 mEq Oral Once    metoprolol tartrate  12.5 mg Oral BID    midodrine  5 mg Oral TID WC    spironolactone  25 mg Oral Daily    amiodarone  200 mg Oral Daily    polyethylene glycol  17 g Oral BID    nystatin  5 mL Oral 4x Daily    sodium chloride flush  10 mL IntraVENous 2 times per day    clopidogrel  75 mg Oral Daily    sennosides-docusate sodium  1 tablet Oral BID    atorvastatin  80 mg Oral Nightly    pantoprazole  40 mg Oral Daily    tamsulosin  0.4 mg Oral Daily     Continuous Infusions:    dexmedetomidine Stopped (03/31/22 1217)    sodium chloride Stopped (03/29/22 1653)    dextrose      sodium chloride      sodium chloride         Data:  CBC:   Recent Labs     04/02/22  0528 04/04/22  0232   WBC 13.0* 14.1*   HGB 8.7* 8.9*   HCT 27.8* 27.9*   MCV 86.6 85.1   PLT See Reflexed IPF Result 551*     BMP:   Recent Labs     04/02/22  0528 04/04/22  0232    133*   K 3.6* 3.8   CL 98 97*   CO2 28 26   BUN 14 9   CREATININE 0.88 0.81     PT/INR:   Recent Labs     04/02/22  0528 04/04/22  0232   PROTIME 12.7* 13.1*   INR 1.2 1.2     APTT:   No results for input(s): APTT in the last 72 hours. Chest X-Ray: worsening atelectasis process to left thorax. 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 He is A&0x4.   o Please DC patient today-from CTS medically ready for placement at this time  o Pt refuses treatment when upset. The above recommendations including medications and orders were discussed and agreed upon with Dr. Vanessa Lawler, the attending on service for the cardiothoracic surgery group today. Electronically signed by JACKELYN Pickett NP on 4/4/2022 at 10:10 AM    On this date 3/24/2022 I have spent 27 minutes reviewing previous notes, test results and face to face with the patient discussing the diagnosis and importance of compliance with the treatment plan as well as documenting on the day of the visit. At least 50% of the time documented was spent with the patient to provide counseling and/or coordination of care.     This note was created with the assistance of a speech-recognition program.  Although the intention is to generate a document that actually reflects the content of the visit, no guarantees can be provided that every mistake has been identified and corrected by editing. Note was updated later by me after  physical examination and  completion of the assessment. hide

## 2023-12-17 NOTE — ED PROVIDER NOTE - NSFOLLOWUPINSTRUCTIONS_ED_ALL_ED_FT
Please call your urologist to make a follow-up appointment to be seen.  If you cannot be seen by your urologist, please call the [facility] urology clinic in order to make an appointment.  The number is [phone].  You have been prescribed medication to help resolve the bladder spasm symptoms.  The medication is called by Vibe[employer]on.  You will take 1 tablet once a day as prescribed.  Please return to the emergency room if you develop any allergic like reactions to the medication.        Acute Urinary Retention, Male    Acute urinary retention is a condition in which a person is unable to pass urine or can only pass a little urine. This condition can happen suddenly and last for a short time. If left untreated, it can become long-term (chronic) and result in kidney damage or other serious complications.    What are the causes?  This condition may be caused by:  Obstruction or narrowing of the tube that drains the bladder (urethra). This may be caused by surgery, problems with nearby organs, or injury to the bladder or urethra.  Problems with the nerves in the bladder.  Tumors in the area of the pelvis, bladder, or urethra.  Certain medicines.  Bladder or urinary tract infection.  Constipation.  What increases the risk?  This condition is more likely to develop in older men. As men age, their prostate may become larger and may start to press or squeeze on the bladder or the urethra. Other chronic health conditions can increase the risk of acute urinary retention. These include:  Diseases such as multiple sclerosis.  Spinal cord injuries.  Diabetes.  Degenerative cognitive conditions, such as delirium or dementia.  Psychological conditions. A man may hold his urine due to trauma or because he does not want to use the bathroom.  What are the signs or symptoms?  Symptoms of this condition include:  Trouble urinating.  Pain in the lower abdomen.  How is this diagnosed?  This condition is diagnosed based on a physical exam and your medical history. You may also have other tests, including:  An ultrasound of the bladder or kidneys or both.  Blood tests.  A urine analysis.  Additional tests may be needed, such as a CT scan, MRI, and kidney or bladder function tests.  How is this treated?  Treatment for this condition may include:  Medicines.  Placing a thin, sterile tube (catheter) into the bladder to drain urine out of the body. This is called an indwelling urinary catheter. After it is inserted, the catheter is held in place with a small balloon that is filled with sterile water. Urine drains from the catheter into a collection bag outside of the body.  Behavioral therapy.  Treatment for other conditions.  If needed, you may be treated in the hospital for kidney function problems or to manage other complications.    Follow these instructions at home:  Medicines    Take over-the-counter and prescription medicines only as told by your health care provider. Avoid certain medicines, such as decongestants, antihistamines, and some prescription medicines. Do not take any medicine unless your health care provider approves.  If you were prescribed an antibiotic medicine, take it as told by your health care provider. Do not stop using the antibiotic even if you start to feel better.  General instructions    Do not use any products that contain nicotine or tobacco. These products include cigarettes, chewing tobacco, and vaping devices, such as e-cigarettes. If you need help quitting, ask your health care provider.  Drink enough fluid to keep your urine pale yellow.  If you have an indwelling urinary catheter, follow the instructions from your health care provider.  Monitor any changes in your symptoms. Tell your health care provider about any changes.  If instructed, monitor your blood pressure at home. Report changes as told by your health care provider.  Keep all follow-up visits. This is important.  Contact a health care provider if:  You have uncomfortable bladder contractions that you cannot control (spasms).  You leak urine with the spasms.  Get help right away if:  You have chills or a fever.  You have blood in your urine.  You have a catheter and the following happens:  Your catheter stops draining urine.  Your catheter falls out.  Summary  Acute urinary retention is a condition in which a person is unable to pass urine or can only pass a little urine. If left untreated, this condition can result in kidney damage or other serious complications.  An enlarged prostate may cause this condition. As men age, their prostate gland may become larger and may press or squeeze on the bladder or the urethra.  Treatment for this condition may include medicines and placement of an indwelling urinary catheter.  Monitor any changes in your symptoms. Tell your health care provider about any changes.  This information is not intended to replace advice given to you by your health care provider. Make sure you discuss any questions you have with your health care provider. Please call your urologist to make a follow-up appointment to be seen.  If you cannot be seen by your urologist, please call the [facility] urology clinic in order to make an appointment.  The number is [phone].  You have been prescribed medication to help resolve the bladder spasm symptoms.  The medication is called by Vibegron.  You will take 1 tablet once a day as prescribed.  Please return to the emergency room if you develop any allergic like reactions to the medication.        Acute Urinary Retention, Male    Acute urinary retention is a condition in which a person is unable to pass urine or can only pass a little urine. This condition can happen suddenly and last for a short time. If left untreated, it can become long-term (chronic) and result in kidney damage or other serious complications.    What are the causes?  This condition may be caused by:  Obstruction or narrowing of the tube that drains the bladder (urethra). This may be caused by surgery, problems with nearby organs, or injury to the bladder or urethra.  Problems with the nerves in the bladder.  Tumors in the area of the pelvis, bladder, or urethra.  Certain medicines.  Bladder or urinary tract infection.  Constipation.  What increases the risk?  This condition is more likely to develop in older men. As men age, their prostate may become larger and may start to press or squeeze on the bladder or the urethra. Other chronic health conditions can increase the risk of acute urinary retention. These include:  Diseases such as multiple sclerosis.  Spinal cord injuries.  Diabetes.  Degenerative cognitive conditions, such as delirium or dementia.  Psychological conditions. A man may hold his urine due to trauma or because he does not want to use the bathroom.  What are the signs or symptoms?  Symptoms of this condition include:  Trouble urinating.  Pain in the lower abdomen.  How is this diagnosed?  This condition is diagnosed based on a physical exam and your medical history. You may also have other tests, including:  An ultrasound of the bladder or kidneys or both.  Blood tests.  A urine analysis.  Additional tests may be needed, such as a CT scan, MRI, and kidney or bladder function tests.  How is this treated?  Treatment for this condition may include:  Medicines.  Placing a thin, sterile tube (catheter) into the bladder to drain urine out of the body. This is called an indwelling urinary catheter. After it is inserted, the catheter is held in place with a small balloon that is filled with sterile water. Urine drains from the catheter into a collection bag outside of the body.  Behavioral therapy.  Treatment for other conditions.  If needed, you may be treated in the hospital for kidney function problems or to manage other complications.    Follow these instructions at home:  Medicines    Take over-the-counter and prescription medicines only as told by your health care provider. Avoid certain medicines, such as decongestants, antihistamines, and some prescription medicines. Do not take any medicine unless your health care provider approves.  If you were prescribed an antibiotic medicine, take it as told by your health care provider. Do not stop using the antibiotic even if you start to feel better.  General instructions    Do not use any products that contain nicotine or tobacco. These products include cigarettes, chewing tobacco, and vaping devices, such as e-cigarettes. If you need help quitting, ask your health care provider.  Drink enough fluid to keep your urine pale yellow.  If you have an indwelling urinary catheter, follow the instructions from your health care provider.  Monitor any changes in your symptoms. Tell your health care provider about any changes.  If instructed, monitor your blood pressure at home. Report changes as told by your health care provider.  Keep all follow-up visits. This is important.  Contact a health care provider if:  You have uncomfortable bladder contractions that you cannot control (spasms).  You leak urine with the spasms.  Get help right away if:  You have chills or a fever.  You have blood in your urine.  You have a catheter and the following happens:  Your catheter stops draining urine.  Your catheter falls out.  Summary  Acute urinary retention is a condition in which a person is unable to pass urine or can only pass a little urine. If left untreated, this condition can result in kidney damage or other serious complications.  An enlarged prostate may cause this condition. As men age, their prostate gland may become larger and may press or squeeze on the bladder or the urethra.  Treatment for this condition may include medicines and placement of an indwelling urinary catheter.  Monitor any changes in your symptoms. Tell your health care provider about any changes.  This information is not intended to replace advice given to you by your health care provider. Make sure you discuss any questions you have with your health care provider.

## 2023-12-17 NOTE — ED ADULT NURSE NOTE - SUICIDE SCREENING QUESTION 1
Respiratory Therapy found this RN stating that patient is having chest pain, and patient's oxygen saturation is 80-85% on RA at rest. RN came into room and patient was at the sink brushing her teeth and had labored breathing. Patient ambulated back to bed and 3L of oxygen was applied. O2 sat was then 97%. Dr. Carlson notified of chest pain. EKG and troponin orders were placed. Patient's chest pain resolved immediately when resting in bed.    No

## 2023-12-17 NOTE — ED ADULT NURSE NOTE - NSFALLUNIVINTERV_ED_ALL_ED
Bed/Stretcher in lowest position, wheels locked, appropriate side rails in place/Call bell, personal items and telephone in reach/Instruct patient to call for assistance before getting out of bed/chair/stretcher/Non-slip footwear applied when patient is off stretcher/[place] to call system/Physically safe environment - no spills, clutter or unnecessary equipment/Purposeful proactive rounding/Room/bathroom lighting operational, light cord in reach Bed/Stretcher in lowest position, wheels locked, appropriate side rails in place/Call bell, personal items and telephone in reach/Instruct patient to call for assistance before getting out of bed/chair/stretcher/Non-slip footwear applied when patient is off stretcher/[place] to call system/Physically safe environment - no spills, clutter or unnecessary equipment/Purposeful proactive rounding/Room/bathroom lighting operational, light cord in reach

## 2023-12-18 ENCOUNTER — EMERGENCY (EMERGENCY)
Facility: HOSPITAL | Age: 83
LOS: 1 days | Discharge: HOME | End: 2023-12-18
Admitting: EMERGENCY MEDICINE
Payer: MEDICARE

## 2023-12-18 VITALS
RESPIRATION RATE: 18 BRPM | DIASTOLIC BLOOD PRESSURE: 70 MMHG | OXYGEN SATURATION: 99 % | SYSTOLIC BLOOD PRESSURE: 109 MMHG | HEART RATE: 68 BPM

## 2023-12-18 VITALS
DIASTOLIC BLOOD PRESSURE: 66 MMHG | OXYGEN SATURATION: 96 % | HEART RATE: 65 BPM | RESPIRATION RATE: 18 BRPM | SYSTOLIC BLOOD PRESSURE: 102 MMHG | WEIGHT: 130.07 LBS | TEMPERATURE: 98 F

## 2023-12-18 DIAGNOSIS — R33.8 OTHER RETENTION OF URINE: ICD-10-CM

## 2023-12-18 DIAGNOSIS — Z98.890 OTHER SPECIFIED POSTPROCEDURAL STATES: Chronic | ICD-10-CM

## 2023-12-18 DIAGNOSIS — T83.091A OTHER MECHANICAL COMPLICATION OF INDWELLING URETHRAL CATHETER, INITIAL ENCOUNTER: ICD-10-CM

## 2023-12-18 DIAGNOSIS — Z90.89 ACQUIRED ABSENCE OF OTHER ORGANS: Chronic | ICD-10-CM

## 2023-12-18 DIAGNOSIS — N40.1 BENIGN PROSTATIC HYPERPLASIA WITH LOWER URINARY TRACT SYMPTOMS: ICD-10-CM

## 2023-12-18 LAB
APPEARANCE UR: ABNORMAL
APPEARANCE UR: ABNORMAL
BACTERIA # UR AUTO: ABNORMAL /HPF
BACTERIA # UR AUTO: ABNORMAL /HPF
BILIRUB UR-MCNC: ABNORMAL
BILIRUB UR-MCNC: ABNORMAL
COLOR SPEC: SIGNIFICANT CHANGE UP
COLOR SPEC: SIGNIFICANT CHANGE UP
DIFF PNL FLD: ABNORMAL
DIFF PNL FLD: ABNORMAL
EPI CELLS # UR: PRESENT
EPI CELLS # UR: PRESENT
GLUCOSE UR QL: NEGATIVE MG/DL — SIGNIFICANT CHANGE UP
GLUCOSE UR QL: NEGATIVE MG/DL — SIGNIFICANT CHANGE UP
KETONES UR-MCNC: ABNORMAL MG/DL
KETONES UR-MCNC: ABNORMAL MG/DL
LEUKOCYTE ESTERASE UR-ACNC: ABNORMAL
LEUKOCYTE ESTERASE UR-ACNC: ABNORMAL
NITRITE UR-MCNC: POSITIVE
NITRITE UR-MCNC: POSITIVE
PH UR: 6.5 — SIGNIFICANT CHANGE UP (ref 5–8)
PH UR: 6.5 — SIGNIFICANT CHANGE UP (ref 5–8)
PROT UR-MCNC: 300 MG/DL
PROT UR-MCNC: 300 MG/DL
RBC CASTS # UR COMP ASSIST: SIGNIFICANT CHANGE UP /HPF (ref 0–4)
RBC CASTS # UR COMP ASSIST: SIGNIFICANT CHANGE UP /HPF (ref 0–4)
SP GR SPEC: 1.04 — HIGH (ref 1–1.03)
SP GR SPEC: 1.04 — HIGH (ref 1–1.03)
UROBILINOGEN FLD QL: 1 MG/DL — SIGNIFICANT CHANGE UP (ref 0.2–1)
UROBILINOGEN FLD QL: 1 MG/DL — SIGNIFICANT CHANGE UP (ref 0.2–1)
WBC UR QL: SIGNIFICANT CHANGE UP /HPF (ref 0–5)
WBC UR QL: SIGNIFICANT CHANGE UP /HPF (ref 0–5)

## 2023-12-18 PROCEDURE — 99284 EMERGENCY DEPT VISIT MOD MDM: CPT

## 2023-12-18 RX ORDER — CEFPODOXIME PROXETIL 100 MG
1 TABLET ORAL
Qty: 14 | Refills: 0
Start: 2023-12-18 | End: 2023-12-24

## 2023-12-18 NOTE — ED PROVIDER NOTE - OBJECTIVE STATEMENT
Patient with a history of BPH presents with indwelling Sanchez placed in this emergency room for the last 2 days requesting removal of Sanchez.  Patient attempted to make a follow-up appointment with urologist but cannot see him for a few weeks.  Denies any fevers chills dysuria admits to bladder spasm secondary to Sanchez placement.

## 2023-12-18 NOTE — ED PROVIDER NOTE - PATIENT PORTAL LINK FT
You can access the FollowMyHealth Patient Portal offered by [facility] by registering at the following website: [email or web address]. By joining [employer]’s FollowMyHealth portal, you will also be able to view your health information using other applications (apps) compatible with our system. You can access the FollowMyHealth Patient Portal offered by [facility] by registering at the following website: [email or web address]. By joining [employer]’s FollowMyHealth portal, you will also be able to view your health information using other applications (apps) compatible with our system.

## 2023-12-18 NOTE — ED PROVIDER NOTE - CLINICAL SUMMARY MEDICAL DECISION MAKING FREE TEXT BOX
Patient with a history of urinary retention with indwelling Sanchez placed 2 days ago in this ED for urinary retention.  Patient presents requesting removal of catheter.  Attempted to make follow-up with his urologist but is unable to see him for a few weeks.  Suprapubic discomfort bladder spasms on exam patient is well-appearing neurovascular intact.  Will DC Sanchez and observe until he is able to void.

## 2023-12-18 NOTE — ED ADULT NURSE NOTE - NSFALLUNIVINTERV_ED_ALL_ED
Bed/Stretcher in lowest position, wheels locked, appropriate side rails in place/Call bell, personal items and telephone in reach/Instruct patient to call for assistance before getting out of bed/chair/stretcher/Non-slip footwear applied when patient is off stretcher/[place] to call system/Physically safe environment - no spills, clutter or unnecessary equipment/Purposeful proactive rounding/Room/bathroom lighting operational, light cord in reach Bed/Stretcher in lowest position, wheels locked, appropriate side rails in place/Call bell, personal items and telephone in reach/Instruct patient to call for assistance before getting out of bed/chair/stretcher/Non-slip footwear applied when patient is off stretcher/[place] to call system/Physically safe environment - no spills, clutter or unnecessary equipment/Purposeful proactive rounding/Room/bathroom lighting operational, light cord in reach

## 2023-12-18 NOTE — ED PROVIDER NOTE - PROGRESS NOTE DETAILS
Patient was able to void but  small caliber.  UA positive for leuks and nitrates.  Will prescribe antibiotics.  Called out to the lab as noted off of the sample for urine culture.  Discussed with patient advised that he may need Sanchez catheter placed again given that he may still have signs of retention.  Patient verbalized understanding but is refusing to stay any further refusing catheter placement states that he will come back if he cannot urinate.  Rx sent for cefpodoxime.  All precautions reviewed. Sanchez catheter removed at patient's request.  Patient encouraged to be helped p.o. hydrate and will observe until able to void.

## 2023-12-18 NOTE — ED ADULT NURSE NOTE - OBJECTIVE STATEMENT
walks in requesting to have banks removed that was placed on saturday. banks was discontinued - pending urine.

## 2023-12-19 DIAGNOSIS — K62.89 OTHER SPECIFIED DISEASES OF ANUS AND RECTUM: ICD-10-CM

## 2023-12-19 DIAGNOSIS — R10.30 LOWER ABDOMINAL PAIN, UNSPECIFIED: ICD-10-CM

## 2023-12-19 DIAGNOSIS — N32.89 OTHER SPECIFIED DISORDERS OF BLADDER: ICD-10-CM

## 2023-12-19 DIAGNOSIS — N40.0 BENIGN PROSTATIC HYPERPLASIA WITHOUT LOWER URINARY TRACT SYMPTOMS: ICD-10-CM

## 2023-12-19 DIAGNOSIS — Z96.0 PRESENCE OF UROGENITAL IMPLANTS: ICD-10-CM

## 2023-12-19 DIAGNOSIS — Z91.013 ALLERGY TO SEAFOOD: ICD-10-CM

## 2023-12-20 ENCOUNTER — EMERGENCY (EMERGENCY)
Facility: HOSPITAL | Age: 83
LOS: 1 days | Discharge: HOME | End: 2023-12-20
Admitting: EMERGENCY MEDICINE
Payer: MEDICARE

## 2023-12-20 VITALS
SYSTOLIC BLOOD PRESSURE: 129 MMHG | DIASTOLIC BLOOD PRESSURE: 81 MMHG | OXYGEN SATURATION: 99 % | WEIGHT: 184.97 LBS | RESPIRATION RATE: 17 BRPM | HEART RATE: 85 BPM | HEIGHT: 70 IN | TEMPERATURE: 98 F

## 2023-12-20 DIAGNOSIS — K64.9 UNSPECIFIED HEMORRHOIDS: ICD-10-CM

## 2023-12-20 DIAGNOSIS — R33.9 RETENTION OF URINE, UNSPECIFIED: ICD-10-CM

## 2023-12-20 DIAGNOSIS — Z90.89 ACQUIRED ABSENCE OF OTHER ORGANS: Chronic | ICD-10-CM

## 2023-12-20 DIAGNOSIS — R11.0 NAUSEA: ICD-10-CM

## 2023-12-20 DIAGNOSIS — Z96.0 PRESENCE OF UROGENITAL IMPLANTS: ICD-10-CM

## 2023-12-20 DIAGNOSIS — Z91.013 ALLERGY TO SEAFOOD: ICD-10-CM

## 2023-12-20 DIAGNOSIS — R33.8 OTHER RETENTION OF URINE: ICD-10-CM

## 2023-12-20 DIAGNOSIS — Z98.890 OTHER SPECIFIED POSTPROCEDURAL STATES: Chronic | ICD-10-CM

## 2023-12-20 DIAGNOSIS — N40.1 BENIGN PROSTATIC HYPERPLASIA WITH LOWER URINARY TRACT SYMPTOMS: ICD-10-CM

## 2023-12-20 DIAGNOSIS — N40.0 BENIGN PROSTATIC HYPERPLASIA WITHOUT LOWER URINARY TRACT SYMPTOMS: ICD-10-CM

## 2023-12-20 DIAGNOSIS — U07.1 COVID-19: ICD-10-CM

## 2023-12-20 DIAGNOSIS — Z90.49 ACQUIRED ABSENCE OF OTHER SPECIFIED PARTS OF DIGESTIVE TRACT: Chronic | ICD-10-CM

## 2023-12-20 LAB
ALBUMIN SERPL ELPH-MCNC: 3.3 G/DL — LOW (ref 3.4–5)
ALBUMIN SERPL ELPH-MCNC: 3.3 G/DL — LOW (ref 3.4–5)
ALP SERPL-CCNC: 87 U/L — SIGNIFICANT CHANGE UP (ref 40–120)
ALP SERPL-CCNC: 87 U/L — SIGNIFICANT CHANGE UP (ref 40–120)
ALT FLD-CCNC: 15 U/L — SIGNIFICANT CHANGE UP (ref 12–42)
ALT FLD-CCNC: 15 U/L — SIGNIFICANT CHANGE UP (ref 12–42)
ANION GAP SERPL CALC-SCNC: 4 MMOL/L — LOW (ref 9–16)
ANION GAP SERPL CALC-SCNC: 4 MMOL/L — LOW (ref 9–16)
APPEARANCE UR: CLEAR — SIGNIFICANT CHANGE UP
APPEARANCE UR: CLEAR — SIGNIFICANT CHANGE UP
APTT BLD: 29.9 SEC — SIGNIFICANT CHANGE UP (ref 24.5–35.6)
APTT BLD: 29.9 SEC — SIGNIFICANT CHANGE UP (ref 24.5–35.6)
AST SERPL-CCNC: 18 U/L — SIGNIFICANT CHANGE UP (ref 15–37)
AST SERPL-CCNC: 18 U/L — SIGNIFICANT CHANGE UP (ref 15–37)
BACTERIA # UR AUTO: ABNORMAL /HPF
BACTERIA # UR AUTO: ABNORMAL /HPF
BASOPHILS # BLD AUTO: 0.02 K/UL — SIGNIFICANT CHANGE UP (ref 0–0.2)
BASOPHILS # BLD AUTO: 0.02 K/UL — SIGNIFICANT CHANGE UP (ref 0–0.2)
BASOPHILS NFR BLD AUTO: 0.4 % — SIGNIFICANT CHANGE UP (ref 0–2)
BASOPHILS NFR BLD AUTO: 0.4 % — SIGNIFICANT CHANGE UP (ref 0–2)
BILIRUB SERPL-MCNC: 0.4 MG/DL — SIGNIFICANT CHANGE UP (ref 0.2–1.2)
BILIRUB SERPL-MCNC: 0.4 MG/DL — SIGNIFICANT CHANGE UP (ref 0.2–1.2)
BILIRUB UR-MCNC: NEGATIVE — SIGNIFICANT CHANGE UP
BILIRUB UR-MCNC: NEGATIVE — SIGNIFICANT CHANGE UP
BUN SERPL-MCNC: 23 MG/DL — SIGNIFICANT CHANGE UP (ref 7–23)
BUN SERPL-MCNC: 23 MG/DL — SIGNIFICANT CHANGE UP (ref 7–23)
CALCIUM SERPL-MCNC: 9.1 MG/DL — SIGNIFICANT CHANGE UP (ref 8.5–10.5)
CALCIUM SERPL-MCNC: 9.1 MG/DL — SIGNIFICANT CHANGE UP (ref 8.5–10.5)
CHLORIDE SERPL-SCNC: 103 MMOL/L — SIGNIFICANT CHANGE UP (ref 96–108)
CHLORIDE SERPL-SCNC: 103 MMOL/L — SIGNIFICANT CHANGE UP (ref 96–108)
CO2 SERPL-SCNC: 32 MMOL/L — HIGH (ref 22–31)
CO2 SERPL-SCNC: 32 MMOL/L — HIGH (ref 22–31)
COLOR SPEC: SIGNIFICANT CHANGE UP
COLOR SPEC: SIGNIFICANT CHANGE UP
COMMENT - URINE: SIGNIFICANT CHANGE UP
COMMENT - URINE: SIGNIFICANT CHANGE UP
CREAT SERPL-MCNC: 1.15 MG/DL — SIGNIFICANT CHANGE UP (ref 0.5–1.3)
CREAT SERPL-MCNC: 1.15 MG/DL — SIGNIFICANT CHANGE UP (ref 0.5–1.3)
DIFF PNL FLD: NEGATIVE — SIGNIFICANT CHANGE UP
DIFF PNL FLD: NEGATIVE — SIGNIFICANT CHANGE UP
EGFR: 63 ML/MIN/1.73M2 — SIGNIFICANT CHANGE UP
EGFR: 63 ML/MIN/1.73M2 — SIGNIFICANT CHANGE UP
EOSINOPHIL # BLD AUTO: 0.31 K/UL — SIGNIFICANT CHANGE UP (ref 0–0.5)
EOSINOPHIL # BLD AUTO: 0.31 K/UL — SIGNIFICANT CHANGE UP (ref 0–0.5)
EOSINOPHIL NFR BLD AUTO: 6.4 % — HIGH (ref 0–6)
EOSINOPHIL NFR BLD AUTO: 6.4 % — HIGH (ref 0–6)
GLUCOSE SERPL-MCNC: 111 MG/DL — HIGH (ref 70–99)
GLUCOSE SERPL-MCNC: 111 MG/DL — HIGH (ref 70–99)
GLUCOSE UR QL: NEGATIVE MG/DL — SIGNIFICANT CHANGE UP
GLUCOSE UR QL: NEGATIVE MG/DL — SIGNIFICANT CHANGE UP
HCT VFR BLD CALC: 40.2 % — SIGNIFICANT CHANGE UP (ref 39–50)
HCT VFR BLD CALC: 40.2 % — SIGNIFICANT CHANGE UP (ref 39–50)
HGB BLD-MCNC: 13.4 G/DL — SIGNIFICANT CHANGE UP (ref 13–17)
HGB BLD-MCNC: 13.4 G/DL — SIGNIFICANT CHANGE UP (ref 13–17)
IMM GRANULOCYTES NFR BLD AUTO: 0.4 % — SIGNIFICANT CHANGE UP (ref 0–0.9)
IMM GRANULOCYTES NFR BLD AUTO: 0.4 % — SIGNIFICANT CHANGE UP (ref 0–0.9)
INR BLD: 1.02 — SIGNIFICANT CHANGE UP (ref 0.85–1.18)
INR BLD: 1.02 — SIGNIFICANT CHANGE UP (ref 0.85–1.18)
KETONES UR-MCNC: ABNORMAL MG/DL
KETONES UR-MCNC: ABNORMAL MG/DL
LACTATE BLDV-MCNC: 0.7 MMOL/L — SIGNIFICANT CHANGE UP (ref 0.5–2)
LACTATE BLDV-MCNC: 0.7 MMOL/L — SIGNIFICANT CHANGE UP (ref 0.5–2)
LEUKOCYTE ESTERASE UR-ACNC: ABNORMAL
LEUKOCYTE ESTERASE UR-ACNC: ABNORMAL
LYMPHOCYTES # BLD AUTO: 1.72 K/UL — SIGNIFICANT CHANGE UP (ref 1–3.3)
LYMPHOCYTES # BLD AUTO: 1.72 K/UL — SIGNIFICANT CHANGE UP (ref 1–3.3)
LYMPHOCYTES # BLD AUTO: 35.6 % — SIGNIFICANT CHANGE UP (ref 13–44)
LYMPHOCYTES # BLD AUTO: 35.6 % — SIGNIFICANT CHANGE UP (ref 13–44)
MAGNESIUM SERPL-MCNC: 2.2 MG/DL — SIGNIFICANT CHANGE UP (ref 1.6–2.6)
MAGNESIUM SERPL-MCNC: 2.2 MG/DL — SIGNIFICANT CHANGE UP (ref 1.6–2.6)
MCHC RBC-ENTMCNC: 32.8 PG — SIGNIFICANT CHANGE UP (ref 27–34)
MCHC RBC-ENTMCNC: 32.8 PG — SIGNIFICANT CHANGE UP (ref 27–34)
MCHC RBC-ENTMCNC: 33.3 GM/DL — SIGNIFICANT CHANGE UP (ref 32–36)
MCHC RBC-ENTMCNC: 33.3 GM/DL — SIGNIFICANT CHANGE UP (ref 32–36)
MCV RBC AUTO: 98.5 FL — SIGNIFICANT CHANGE UP (ref 80–100)
MCV RBC AUTO: 98.5 FL — SIGNIFICANT CHANGE UP (ref 80–100)
MONOCYTES # BLD AUTO: 0.46 K/UL — SIGNIFICANT CHANGE UP (ref 0–0.9)
MONOCYTES # BLD AUTO: 0.46 K/UL — SIGNIFICANT CHANGE UP (ref 0–0.9)
MONOCYTES NFR BLD AUTO: 9.5 % — SIGNIFICANT CHANGE UP (ref 2–14)
MONOCYTES NFR BLD AUTO: 9.5 % — SIGNIFICANT CHANGE UP (ref 2–14)
NEUTROPHILS # BLD AUTO: 2.3 K/UL — SIGNIFICANT CHANGE UP (ref 1.8–7.4)
NEUTROPHILS # BLD AUTO: 2.3 K/UL — SIGNIFICANT CHANGE UP (ref 1.8–7.4)
NEUTROPHILS NFR BLD AUTO: 47.7 % — SIGNIFICANT CHANGE UP (ref 43–77)
NEUTROPHILS NFR BLD AUTO: 47.7 % — SIGNIFICANT CHANGE UP (ref 43–77)
NITRITE UR-MCNC: NEGATIVE — SIGNIFICANT CHANGE UP
NITRITE UR-MCNC: NEGATIVE — SIGNIFICANT CHANGE UP
NRBC # BLD: 0 /100 WBCS — SIGNIFICANT CHANGE UP (ref 0–0)
NRBC # BLD: 0 /100 WBCS — SIGNIFICANT CHANGE UP (ref 0–0)
PH UR: 6 — SIGNIFICANT CHANGE UP (ref 5–8)
PH UR: 6 — SIGNIFICANT CHANGE UP (ref 5–8)
PLATELET # BLD AUTO: 191 K/UL — SIGNIFICANT CHANGE UP (ref 150–400)
PLATELET # BLD AUTO: 191 K/UL — SIGNIFICANT CHANGE UP (ref 150–400)
POTASSIUM SERPL-MCNC: 4.4 MMOL/L — SIGNIFICANT CHANGE UP (ref 3.5–5.3)
POTASSIUM SERPL-MCNC: 4.4 MMOL/L — SIGNIFICANT CHANGE UP (ref 3.5–5.3)
POTASSIUM SERPL-SCNC: 4.4 MMOL/L — SIGNIFICANT CHANGE UP (ref 3.5–5.3)
POTASSIUM SERPL-SCNC: 4.4 MMOL/L — SIGNIFICANT CHANGE UP (ref 3.5–5.3)
PROT SERPL-MCNC: 7 G/DL — SIGNIFICANT CHANGE UP (ref 6.4–8.2)
PROT SERPL-MCNC: 7 G/DL — SIGNIFICANT CHANGE UP (ref 6.4–8.2)
PROT UR-MCNC: 30 MG/DL
PROT UR-MCNC: 30 MG/DL
PROTHROM AB SERPL-ACNC: 11.2 SEC — SIGNIFICANT CHANGE UP (ref 9.5–13)
PROTHROM AB SERPL-ACNC: 11.2 SEC — SIGNIFICANT CHANGE UP (ref 9.5–13)
RBC # BLD: 4.08 M/UL — LOW (ref 4.2–5.8)
RBC # BLD: 4.08 M/UL — LOW (ref 4.2–5.8)
RBC # FLD: 12.5 % — SIGNIFICANT CHANGE UP (ref 10.3–14.5)
RBC # FLD: 12.5 % — SIGNIFICANT CHANGE UP (ref 10.3–14.5)
RBC CASTS # UR COMP ASSIST: 3 /HPF — SIGNIFICANT CHANGE UP (ref 0–4)
RBC CASTS # UR COMP ASSIST: 3 /HPF — SIGNIFICANT CHANGE UP (ref 0–4)
SARS-COV-2 RNA SPEC QL NAA+PROBE: DETECTED
SARS-COV-2 RNA SPEC QL NAA+PROBE: DETECTED
SODIUM SERPL-SCNC: 139 MMOL/L — SIGNIFICANT CHANGE UP (ref 132–145)
SODIUM SERPL-SCNC: 139 MMOL/L — SIGNIFICANT CHANGE UP (ref 132–145)
SP GR SPEC: 1.03 — SIGNIFICANT CHANGE UP (ref 1–1.03)
SP GR SPEC: 1.03 — SIGNIFICANT CHANGE UP (ref 1–1.03)
TROPONIN I, HIGH SENSITIVITY RESULT: <4 NG/L — SIGNIFICANT CHANGE UP
TROPONIN I, HIGH SENSITIVITY RESULT: <4 NG/L — SIGNIFICANT CHANGE UP
UROBILINOGEN FLD QL: 1 MG/DL — SIGNIFICANT CHANGE UP (ref 0.2–1)
UROBILINOGEN FLD QL: 1 MG/DL — SIGNIFICANT CHANGE UP (ref 0.2–1)
WBC # BLD: 4.83 K/UL — SIGNIFICANT CHANGE UP (ref 3.8–10.5)
WBC # BLD: 4.83 K/UL — SIGNIFICANT CHANGE UP (ref 3.8–10.5)
WBC # FLD AUTO: 4.83 K/UL — SIGNIFICANT CHANGE UP (ref 3.8–10.5)
WBC # FLD AUTO: 4.83 K/UL — SIGNIFICANT CHANGE UP (ref 3.8–10.5)
WBC UR QL: 19 /HPF — HIGH (ref 0–5)
WBC UR QL: 19 /HPF — HIGH (ref 0–5)

## 2023-12-20 PROCEDURE — 99284 EMERGENCY DEPT VISIT MOD MDM: CPT

## 2023-12-20 RX ORDER — CEFTRIAXONE 500 MG/1
1000 INJECTION, POWDER, FOR SOLUTION INTRAMUSCULAR; INTRAVENOUS ONCE
Refills: 0 | Status: COMPLETED | OUTPATIENT
Start: 2023-12-20 | End: 2023-12-20

## 2023-12-20 RX ORDER — ONDANSETRON 8 MG/1
4 TABLET, FILM COATED ORAL ONCE
Refills: 0 | Status: COMPLETED | OUTPATIENT
Start: 2023-12-20 | End: 2023-12-20

## 2023-12-20 RX ORDER — NIRMATRELVIR AND RITONAVIR 150-100 MG
1 KIT ORAL
Qty: 1 | Refills: 0
Start: 2023-12-20 | End: 2023-12-24

## 2023-12-20 RX ORDER — SODIUM CHLORIDE 9 MG/ML
1000 INJECTION INTRAMUSCULAR; INTRAVENOUS; SUBCUTANEOUS ONCE
Refills: 0 | Status: COMPLETED | OUTPATIENT
Start: 2023-12-20 | End: 2023-12-20

## 2023-12-20 RX ADMIN — SODIUM CHLORIDE 1000 MILLILITER(S): 9 INJECTION INTRAMUSCULAR; INTRAVENOUS; SUBCUTANEOUS at 10:16

## 2023-12-20 RX ADMIN — CEFTRIAXONE 100 MILLIGRAM(S): 500 INJECTION, POWDER, FOR SOLUTION INTRAMUSCULAR; INTRAVENOUS at 10:46

## 2023-12-20 RX ADMIN — ONDANSETRON 4 MILLIGRAM(S): 8 TABLET, FILM COATED ORAL at 10:15

## 2023-12-20 NOTE — ED ADULT TRIAGE NOTE - CHIEF COMPLAINT QUOTE
pt seen over the weekend for urinary retention c/o bloating and nausea possible side effects of abx cefpodoxime

## 2023-12-20 NOTE — ED PROVIDER NOTE - CLINICAL SUMMARY MEDICAL DECISION MAKING FREE TEXT BOX
82 y/o M here c/o nausea and fatigue, currently under treatment for UTI, now here with new COVID-19 diagnosis. Symptomatically treated and prescribed paxlovid.

## 2023-12-20 NOTE — ED PROVIDER NOTE - PATIENT PORTAL LINK FT
You can access the FollowMyHealth Patient Portal offered by [facility] by registering at the following website: [email or web address]. By joining [employer]’s FollowMyHealth portal, you will also be able to view your health information using other applications (apps) compatible with our system. You can access the FollowMyHealth Patient Portal offered by [facility] by registering at the following website: [email or web address]. By joining [employer]’s FollowMyHealth portal, you will also be able to view your health information using other applications (apps) compatible with our system.

## 2023-12-20 NOTE — ED ADULT NURSE NOTE - NSFALLUNIVINTERV_ED_ALL_ED
Bed/Stretcher in lowest position, wheels locked, appropriate side rails in place/Call bell, personal items and telephone in reach/Instruct patient to call for assistance before getting out of bed/chair/stretcher/Non-slip footwear applied when patient is off stretcher/[place] to call system/Physically safe environment - no spills, clutter or unnecessary equipment/Purposeful proactive rounding/Room/bathroom lighting operational, light cord in reach Bed/Stretcher in lowest position, wheels locked, appropriate side rails in place/Call bell, personal items and telephone in reach/Instruct patient to call for assistance before getting out of bed/chair/stretcher/Non-slip footwear applied when patient is off stretcher/[place] to call system/Physically safe environment - no spills, clutter or unnecessary equipment/Purposeful proactive rounding/Room/bathroom lighting operational, light cord in reach

## 2023-12-20 NOTE — ED PROVIDER NOTE - OBJECTIVE STATEMENT
84 y/o M with PMHx BPH s/p UroLift and hemorrhoids with recent treatment for UTI now coming in with nausea and bloating, saying he hasn't been feeling well and thinks ceftriaxone is the cause. Pt denies CP, SOB, vomiting, abdominal pain, HA, or rashes.

## 2023-12-20 NOTE — ED ADULT NURSE NOTE - HIV OFFER
no discharge R/no pain L/no loss of vision L/no pain R/no photophobia/no blurred vision R/no blurred vision L/no irritation R/no discharge L/no lacrimation L/no loss of vision R/no scleral injection L/no scleral injection R/no diplopia/no irritation L/no lacrimation R
Previously Declined (within the last year)

## 2023-12-21 LAB
CULTURE RESULTS: SIGNIFICANT CHANGE UP
CULTURE RESULTS: SIGNIFICANT CHANGE UP
SPECIMEN SOURCE: SIGNIFICANT CHANGE UP
SPECIMEN SOURCE: SIGNIFICANT CHANGE UP

## 2023-12-25 LAB
CULTURE RESULTS: SIGNIFICANT CHANGE UP
SPECIMEN SOURCE: SIGNIFICANT CHANGE UP

## 2023-12-31 ENCOUNTER — EMERGENCY (EMERGENCY)
Facility: HOSPITAL | Age: 83
LOS: 1 days | Discharge: LEFT BEFORE DISCHARGE | End: 2023-12-31
Attending: EMERGENCY MEDICINE | Admitting: EMERGENCY MEDICINE
Payer: MEDICARE

## 2023-12-31 VITALS
WEIGHT: 134.92 LBS | DIASTOLIC BLOOD PRESSURE: 84 MMHG | HEIGHT: 70 IN | RESPIRATION RATE: 18 BRPM | OXYGEN SATURATION: 99 % | SYSTOLIC BLOOD PRESSURE: 129 MMHG | TEMPERATURE: 98 F | HEART RATE: 75 BPM

## 2023-12-31 DIAGNOSIS — Z90.89 ACQUIRED ABSENCE OF OTHER ORGANS: Chronic | ICD-10-CM

## 2023-12-31 DIAGNOSIS — Z98.890 OTHER SPECIFIED POSTPROCEDURAL STATES: Chronic | ICD-10-CM

## 2023-12-31 DIAGNOSIS — Z90.49 ACQUIRED ABSENCE OF OTHER SPECIFIED PARTS OF DIGESTIVE TRACT: Chronic | ICD-10-CM

## 2023-12-31 LAB
ALBUMIN SERPL ELPH-MCNC: 3.4 G/DL — SIGNIFICANT CHANGE UP (ref 3.4–5)
ALBUMIN SERPL ELPH-MCNC: 3.4 G/DL — SIGNIFICANT CHANGE UP (ref 3.4–5)
ALP SERPL-CCNC: 82 U/L — SIGNIFICANT CHANGE UP (ref 40–120)
ALP SERPL-CCNC: 82 U/L — SIGNIFICANT CHANGE UP (ref 40–120)
ALT FLD-CCNC: 20 U/L — SIGNIFICANT CHANGE UP (ref 12–42)
ALT FLD-CCNC: 20 U/L — SIGNIFICANT CHANGE UP (ref 12–42)
ANION GAP SERPL CALC-SCNC: 8 MMOL/L — LOW (ref 9–16)
ANION GAP SERPL CALC-SCNC: 8 MMOL/L — LOW (ref 9–16)
APPEARANCE UR: CLEAR — SIGNIFICANT CHANGE UP
APPEARANCE UR: CLEAR — SIGNIFICANT CHANGE UP
AST SERPL-CCNC: 18 U/L — SIGNIFICANT CHANGE UP (ref 15–37)
AST SERPL-CCNC: 18 U/L — SIGNIFICANT CHANGE UP (ref 15–37)
BACTERIA # UR AUTO: ABNORMAL /HPF
BACTERIA # UR AUTO: ABNORMAL /HPF
BASOPHILS # BLD AUTO: 0.04 K/UL — SIGNIFICANT CHANGE UP (ref 0–0.2)
BASOPHILS # BLD AUTO: 0.04 K/UL — SIGNIFICANT CHANGE UP (ref 0–0.2)
BASOPHILS NFR BLD AUTO: 0.6 % — SIGNIFICANT CHANGE UP (ref 0–2)
BASOPHILS NFR BLD AUTO: 0.6 % — SIGNIFICANT CHANGE UP (ref 0–2)
BILIRUB SERPL-MCNC: 0.7 MG/DL — SIGNIFICANT CHANGE UP (ref 0.2–1.2)
BILIRUB SERPL-MCNC: 0.7 MG/DL — SIGNIFICANT CHANGE UP (ref 0.2–1.2)
BILIRUB UR-MCNC: NEGATIVE — SIGNIFICANT CHANGE UP
BILIRUB UR-MCNC: NEGATIVE — SIGNIFICANT CHANGE UP
BUN SERPL-MCNC: 32 MG/DL — HIGH (ref 7–23)
BUN SERPL-MCNC: 32 MG/DL — HIGH (ref 7–23)
CALCIUM SERPL-MCNC: 9.5 MG/DL — SIGNIFICANT CHANGE UP (ref 8.5–10.5)
CALCIUM SERPL-MCNC: 9.5 MG/DL — SIGNIFICANT CHANGE UP (ref 8.5–10.5)
CHLORIDE SERPL-SCNC: 97 MMOL/L — SIGNIFICANT CHANGE UP (ref 96–108)
CHLORIDE SERPL-SCNC: 97 MMOL/L — SIGNIFICANT CHANGE UP (ref 96–108)
CO2 SERPL-SCNC: 29 MMOL/L — SIGNIFICANT CHANGE UP (ref 22–31)
CO2 SERPL-SCNC: 29 MMOL/L — SIGNIFICANT CHANGE UP (ref 22–31)
COLOR SPEC: YELLOW — SIGNIFICANT CHANGE UP
COLOR SPEC: YELLOW — SIGNIFICANT CHANGE UP
CREAT SERPL-MCNC: 1.42 MG/DL — HIGH (ref 0.5–1.3)
CREAT SERPL-MCNC: 1.42 MG/DL — HIGH (ref 0.5–1.3)
DIFF PNL FLD: NEGATIVE — SIGNIFICANT CHANGE UP
DIFF PNL FLD: NEGATIVE — SIGNIFICANT CHANGE UP
EGFR: 49 ML/MIN/1.73M2 — LOW
EGFR: 49 ML/MIN/1.73M2 — LOW
EOSINOPHIL # BLD AUTO: 0.33 K/UL — SIGNIFICANT CHANGE UP (ref 0–0.5)
EOSINOPHIL # BLD AUTO: 0.33 K/UL — SIGNIFICANT CHANGE UP (ref 0–0.5)
EOSINOPHIL NFR BLD AUTO: 5.3 % — SIGNIFICANT CHANGE UP (ref 0–6)
EOSINOPHIL NFR BLD AUTO: 5.3 % — SIGNIFICANT CHANGE UP (ref 0–6)
FLUAV AG NPH QL: SIGNIFICANT CHANGE UP
FLUAV AG NPH QL: SIGNIFICANT CHANGE UP
FLUBV AG NPH QL: SIGNIFICANT CHANGE UP
FLUBV AG NPH QL: SIGNIFICANT CHANGE UP
GLUCOSE SERPL-MCNC: 116 MG/DL — HIGH (ref 70–99)
GLUCOSE SERPL-MCNC: 116 MG/DL — HIGH (ref 70–99)
GLUCOSE UR QL: NEGATIVE MG/DL — SIGNIFICANT CHANGE UP
GLUCOSE UR QL: NEGATIVE MG/DL — SIGNIFICANT CHANGE UP
HCT VFR BLD CALC: 40.1 % — SIGNIFICANT CHANGE UP (ref 39–50)
HCT VFR BLD CALC: 40.1 % — SIGNIFICANT CHANGE UP (ref 39–50)
HGB BLD-MCNC: 13.4 G/DL — SIGNIFICANT CHANGE UP (ref 13–17)
HGB BLD-MCNC: 13.4 G/DL — SIGNIFICANT CHANGE UP (ref 13–17)
HYALINE CASTS # UR AUTO: PRESENT
HYALINE CASTS # UR AUTO: PRESENT
IMM GRANULOCYTES NFR BLD AUTO: 0.5 % — SIGNIFICANT CHANGE UP (ref 0–0.9)
IMM GRANULOCYTES NFR BLD AUTO: 0.5 % — SIGNIFICANT CHANGE UP (ref 0–0.9)
KETONES UR-MCNC: NEGATIVE MG/DL — SIGNIFICANT CHANGE UP
KETONES UR-MCNC: NEGATIVE MG/DL — SIGNIFICANT CHANGE UP
LACTATE BLDV-MCNC: 0.7 MMOL/L — SIGNIFICANT CHANGE UP (ref 0.5–2)
LACTATE BLDV-MCNC: 0.7 MMOL/L — SIGNIFICANT CHANGE UP (ref 0.5–2)
LEUKOCYTE ESTERASE UR-ACNC: NEGATIVE — SIGNIFICANT CHANGE UP
LEUKOCYTE ESTERASE UR-ACNC: NEGATIVE — SIGNIFICANT CHANGE UP
LIDOCAIN IGE QN: 22 U/L — SIGNIFICANT CHANGE UP (ref 16–77)
LIDOCAIN IGE QN: 22 U/L — SIGNIFICANT CHANGE UP (ref 16–77)
LYMPHOCYTES # BLD AUTO: 1.92 K/UL — SIGNIFICANT CHANGE UP (ref 1–3.3)
LYMPHOCYTES # BLD AUTO: 1.92 K/UL — SIGNIFICANT CHANGE UP (ref 1–3.3)
LYMPHOCYTES # BLD AUTO: 30.7 % — SIGNIFICANT CHANGE UP (ref 13–44)
LYMPHOCYTES # BLD AUTO: 30.7 % — SIGNIFICANT CHANGE UP (ref 13–44)
MCHC RBC-ENTMCNC: 32.4 PG — SIGNIFICANT CHANGE UP (ref 27–34)
MCHC RBC-ENTMCNC: 32.4 PG — SIGNIFICANT CHANGE UP (ref 27–34)
MCHC RBC-ENTMCNC: 33.4 GM/DL — SIGNIFICANT CHANGE UP (ref 32–36)
MCHC RBC-ENTMCNC: 33.4 GM/DL — SIGNIFICANT CHANGE UP (ref 32–36)
MCV RBC AUTO: 97.1 FL — SIGNIFICANT CHANGE UP (ref 80–100)
MCV RBC AUTO: 97.1 FL — SIGNIFICANT CHANGE UP (ref 80–100)
MONOCYTES # BLD AUTO: 0.28 K/UL — SIGNIFICANT CHANGE UP (ref 0–0.9)
MONOCYTES # BLD AUTO: 0.28 K/UL — SIGNIFICANT CHANGE UP (ref 0–0.9)
MONOCYTES NFR BLD AUTO: 4.5 % — SIGNIFICANT CHANGE UP (ref 2–14)
MONOCYTES NFR BLD AUTO: 4.5 % — SIGNIFICANT CHANGE UP (ref 2–14)
NEUTROPHILS # BLD AUTO: 3.66 K/UL — SIGNIFICANT CHANGE UP (ref 1.8–7.4)
NEUTROPHILS # BLD AUTO: 3.66 K/UL — SIGNIFICANT CHANGE UP (ref 1.8–7.4)
NEUTROPHILS NFR BLD AUTO: 58.4 % — SIGNIFICANT CHANGE UP (ref 43–77)
NEUTROPHILS NFR BLD AUTO: 58.4 % — SIGNIFICANT CHANGE UP (ref 43–77)
NITRITE UR-MCNC: NEGATIVE — SIGNIFICANT CHANGE UP
NITRITE UR-MCNC: NEGATIVE — SIGNIFICANT CHANGE UP
NRBC # BLD: 0 /100 WBCS — SIGNIFICANT CHANGE UP (ref 0–0)
NRBC # BLD: 0 /100 WBCS — SIGNIFICANT CHANGE UP (ref 0–0)
PH UR: 6.5 — SIGNIFICANT CHANGE UP (ref 5–8)
PH UR: 6.5 — SIGNIFICANT CHANGE UP (ref 5–8)
PLATELET # BLD AUTO: 234 K/UL — SIGNIFICANT CHANGE UP (ref 150–400)
PLATELET # BLD AUTO: 234 K/UL — SIGNIFICANT CHANGE UP (ref 150–400)
POTASSIUM SERPL-MCNC: 4.1 MMOL/L — SIGNIFICANT CHANGE UP (ref 3.5–5.3)
POTASSIUM SERPL-MCNC: 4.1 MMOL/L — SIGNIFICANT CHANGE UP (ref 3.5–5.3)
POTASSIUM SERPL-SCNC: 4.1 MMOL/L — SIGNIFICANT CHANGE UP (ref 3.5–5.3)
POTASSIUM SERPL-SCNC: 4.1 MMOL/L — SIGNIFICANT CHANGE UP (ref 3.5–5.3)
PROT SERPL-MCNC: 6.6 G/DL — SIGNIFICANT CHANGE UP (ref 6.4–8.2)
PROT SERPL-MCNC: 6.6 G/DL — SIGNIFICANT CHANGE UP (ref 6.4–8.2)
PROT UR-MCNC: 30 MG/DL
PROT UR-MCNC: 30 MG/DL
RBC # BLD: 4.13 M/UL — LOW (ref 4.2–5.8)
RBC # BLD: 4.13 M/UL — LOW (ref 4.2–5.8)
RBC # FLD: 12.3 % — SIGNIFICANT CHANGE UP (ref 10.3–14.5)
RBC # FLD: 12.3 % — SIGNIFICANT CHANGE UP (ref 10.3–14.5)
RBC CASTS # UR COMP ASSIST: 0 /HPF — SIGNIFICANT CHANGE UP (ref 0–4)
RBC CASTS # UR COMP ASSIST: 0 /HPF — SIGNIFICANT CHANGE UP (ref 0–4)
RSV RNA NPH QL NAA+NON-PROBE: SIGNIFICANT CHANGE UP
RSV RNA NPH QL NAA+NON-PROBE: SIGNIFICANT CHANGE UP
SARS-COV-2 RNA SPEC QL NAA+PROBE: DETECTED
SARS-COV-2 RNA SPEC QL NAA+PROBE: DETECTED
SODIUM SERPL-SCNC: 134 MMOL/L — SIGNIFICANT CHANGE UP (ref 132–145)
SODIUM SERPL-SCNC: 134 MMOL/L — SIGNIFICANT CHANGE UP (ref 132–145)
SP GR SPEC: 1.02 — SIGNIFICANT CHANGE UP (ref 1–1.03)
SP GR SPEC: 1.02 — SIGNIFICANT CHANGE UP (ref 1–1.03)
TROPONIN I, HIGH SENSITIVITY RESULT: <4 NG/L — SIGNIFICANT CHANGE UP
TROPONIN I, HIGH SENSITIVITY RESULT: <4 NG/L — SIGNIFICANT CHANGE UP
UROBILINOGEN FLD QL: 1 MG/DL — SIGNIFICANT CHANGE UP (ref 0.2–1)
UROBILINOGEN FLD QL: 1 MG/DL — SIGNIFICANT CHANGE UP (ref 0.2–1)
WBC # BLD: 6.26 K/UL — SIGNIFICANT CHANGE UP (ref 3.8–10.5)
WBC # BLD: 6.26 K/UL — SIGNIFICANT CHANGE UP (ref 3.8–10.5)
WBC # FLD AUTO: 6.26 K/UL — SIGNIFICANT CHANGE UP (ref 3.8–10.5)
WBC # FLD AUTO: 6.26 K/UL — SIGNIFICANT CHANGE UP (ref 3.8–10.5)
WBC UR QL: 0 /HPF — SIGNIFICANT CHANGE UP (ref 0–5)
WBC UR QL: 0 /HPF — SIGNIFICANT CHANGE UP (ref 0–5)

## 2023-12-31 PROCEDURE — 99285 EMERGENCY DEPT VISIT HI MDM: CPT

## 2023-12-31 PROCEDURE — 71045 X-RAY EXAM CHEST 1 VIEW: CPT | Mod: 26

## 2023-12-31 PROCEDURE — 74177 CT ABD & PELVIS W/CONTRAST: CPT | Mod: 26

## 2023-12-31 RX ORDER — ONDANSETRON 8 MG/1
4 TABLET, FILM COATED ORAL ONCE
Refills: 0 | Status: COMPLETED | OUTPATIENT
Start: 2023-12-31 | End: 2023-12-31

## 2023-12-31 RX ORDER — IOHEXOL 300 MG/ML
30 INJECTION, SOLUTION INTRAVENOUS ONCE
Refills: 0 | Status: COMPLETED | OUTPATIENT
Start: 2023-12-31 | End: 2023-12-31

## 2023-12-31 RX ORDER — KETOROLAC TROMETHAMINE 30 MG/ML
15 SYRINGE (ML) INJECTION ONCE
Refills: 0 | Status: DISCONTINUED | OUTPATIENT
Start: 2023-12-31 | End: 2023-12-31

## 2023-12-31 RX ORDER — SODIUM CHLORIDE 9 MG/ML
1000 INJECTION INTRAMUSCULAR; INTRAVENOUS; SUBCUTANEOUS ONCE
Refills: 0 | Status: COMPLETED | OUTPATIENT
Start: 2023-12-31 | End: 2023-12-31

## 2023-12-31 RX ADMIN — SODIUM CHLORIDE 1000 MILLILITER(S): 9 INJECTION INTRAMUSCULAR; INTRAVENOUS; SUBCUTANEOUS at 09:46

## 2023-12-31 RX ADMIN — IOHEXOL 30 MILLILITER(S): 300 INJECTION, SOLUTION INTRAVENOUS at 10:16

## 2023-12-31 RX ADMIN — ONDANSETRON 4 MILLIGRAM(S): 8 TABLET, FILM COATED ORAL at 09:45

## 2023-12-31 RX ADMIN — Medication 15 MILLIGRAM(S): at 09:45

## 2023-12-31 NOTE — ED ADULT TRIAGE NOTE - PAIN RATING/NUMBER SCALE (0-10): ACTIVITY
Goal Outcome Evaluation:      Plan of Care Reviewed With: patient, caregiver    Overall Patient Progress: improvingOverall Patient Progress: improving     Per Yi Aponte NP pt may discharge today as this is the only day her son can provide a ride home to [place], [place]. Pt's INR is subtheraputic and I have called [facility] on call ARNOLD Raymundo @ [phone] @ 6209 and informed her(I had also left a [facility] last Friday for them). I informed pt and she will call her HHRN Verónica and have her come and draw the INR. Pt also needs other labs later in the week. Pt has all LVAD supplies at home and son is bringing her portable oxygen from [facility].       5 (moderate pain)

## 2023-12-31 NOTE — ED PROVIDER NOTE - PATIENT PORTAL LINK FT
You can access the FollowMyHealth Patient Portal offered by [facility] by registering at the following website: [email or web address]. By joining [employer]’s FollowMyHealth portal, you will also be able to view your health information using other applications (apps) compatible with our system. You can access the FollowMyHealth Patient Portal offered by [facility] by registering at the following website: [email or web address]. By joining [facility]’s FollowMyHealth portal, you will also be able to view your health information using other applications (apps) compatible with our system.

## 2023-12-31 NOTE — ED PROVIDER NOTE - ED STEMI HIDDEN
Quality 111:Pneumonia Vaccination Status For Older Adults: Pneumococcal Vaccination not Administered or Previously Received, Reason not Otherwise Specified Quality 131: Pain Assessment And Follow-Up: Pain assessment using a standardized tool is documented as negative, no follow-up plan required Quality 154 Part B: Falls: Risk Screening (Should Be Reported With Measure 155.): Patient screened for future fall risk; documentation of no falls in the past year or only one fall without injury in the past year Quality 226: Preventive Care And Screening: Tobacco Use: Screening And Cessation Intervention: Patient screened for tobacco use and is an ex/non-smoker Quality 155 (Denominator): Falls Plan Of Care: Plan of Care not Documented, Reason not Otherwise Specified Quality 431: Preventive Care And Screening: Unhealthy Alcohol Use - Screening: Patient screened for unhealthy alcohol use using a single question and scores less than 2 times per year Detail Level: Detailed Quality 402: Tobacco Use And Help With Quitting Among Adolescents: Patient screened for tobacco and never smoked Quality 154 Part A: Falls: Risk Assessment (Should Be Reported With Measure 155.): Falls risk assessment completed and documented in the past 12 months. Quality 110: Preventive Care And Screening: Influenza Immunization: Influenza Immunization Administered during Influenza season Quality 47: Advance Care Plan: Advance care planning not documented, reason not otherwise specified. hide

## 2023-12-31 NOTE — ED PROVIDER NOTE - CARE PROVIDER_API CALL
Eddie Montez  Gastroenterology  [address] [place] [id number]  Phone: [phone]  Fax: [phone]  Follow Up Time:    Eddie Montez  Gastroenterology  [address] [place] [id number]  Phone: [phone]  Fax: [phone]  Follow Up Time:

## 2023-12-31 NOTE — ED PROVIDER NOTE - NSFOLLOWUPINSTRUCTIONS_ED_ALL_ED_FT
Portal Vein Thrombosis     Portal vein thrombosis (PVT) is a blockage from a blood clot in the vein that carries blood from the intestines to the liver (portal vein). PVT can also develop in the branches of the portal vein. The clot may form quickly or develop over time. PVT may slow down or completely stop blood flow. PVT is treatable, but it can be life-threatening.    What are the causes?  PVT is caused by a blood clot. In many cases, the cause of the clot is not known.    What increases the risk?  You are at risk of PVT if you have any condition that slows down blood flow or increases the clotting of blood. These conditions include:     Scarring of the liver (cirrhosis).  Cancer, especially of the liver or pancreas.  Infections of the pancreas or gallbladder.  Blood-clotting disorders.  Surgery or injury of the abdomen.    What are the signs or symptoms?  PVT often does not cause signs or symptoms. In some cases, you may have gastrointestinal (GI) bleeding from a backup of blood flow because of the blockage. This is caused by the veins that have become wide (dilated).    Other signs and symptoms of PVT may include:    Pain in the abdomen.  Nausea or vomiting.  Swelling of the abdomen from too much fluid (ascites).  Fever.  Enlarged spleen.  Gastrointestinal (GI) bleeding from swollen blood vessels in the esophagus or stomach. If this happens, you may:    Vomit blood.  Have bloody diarrhea.  Have black, tarry stools.    How is this diagnosed?  This condition may be diagnosed based on your symptoms and risk factors. Your health care provider will do:    A physical exam.  Imaging studies of your abdomen. These may include ultrasound, CT scan, or MRI.  Tests to check for liver function or infection. These are also done to confirm the diagnosis.    How is this treated?  Treatment of PVT depends on the cause and severity of your condition. It may also include treatment for any underlying conditions. This condition may be treated with:    Medicines to:    Break up a blood clot.  Prevent clotting (anticoagulants). You may first get anticoagulant injections and then continue with anticoagulant pills for several months.  Lower your blood pressure.  Improve blood flow to your liver (octreotide).  Surgery to:    Stop bleeding in the stomach or esophagus. This procedure is usually done using a scope passed through your mouth (endoscopic surgery).  Restore blood flow through the blood clot, or around it (shunt surgery).  An organ transplant to replace the damaged liver with a healthy liver from a donor. This is done in very severe cases of liver damage.    Follow these instructions at home:      Medicines    Take over-the-counter and prescription medicines only as told by your health care provider.  If you were prescribed an antibiotic medicine, take it as told by your health care provider. Do not stop using the antibiotic even if you start to feel better.        Blood Thinners    If you are taking blood thinners:    Talk with your health care provider before you take any medicines that contain aspirin or NSAIDs. These medicines increase your risk for dangerous bleeding.  Get approval from your health care provider before you start taking any new medicines, vitamins, or herbal products. Some of these could interfere with your therapy.  Take your medicine exactly as told, at the same time every day.  Avoid activities that could cause injury or bruising, and follow instructions about how to prevent falls.  Wear a medical alert bracelet or carry a card that lists what medicines you take.    Your health care provider may ask you to:    Have blood tests done regularly so that your medicines may be changed if needed.  Limit foods that have vitamin K. Vitamin K affects how some blood thinners work in the body.    Some common foods that contain high amounts of vitamin K include kale, spinach, and broccoli.  Work with a diet and nutrition specialist (dietitian) to make an eating plan that is right for you.        Lifestyle     Eat foods that are high in fiber, such as fresh fruits and vegetables, whole grains, and beans.  Limit foods that are high in fat and processed sugars, such as fried and sweet foods.  Do not use any products that contain nicotine or tobacco, such as cigarettes, e-cigarettes, and chewing tobacco. If you need help quitting, ask your health care provider.  Do not drink alcohol. Alcohol can damage your liver.  Ask your health care provider if you have other fluid or diet restrictions.        General instructions    Return to your normal activities as told by your health care provider. Ask your health care provider what activities are safe for you.  Keep all follow-up visits as told by your health care provider. This is important.   You may be asked to have regular blood tests if you are taking blood thinners.    Contact a health care provider if:  You have chills or a fever.  You have any signs or symptoms that get worse or come back.  There is blood in your stool.  You have black, tarry stools.    Get help right away if:  You vomit blood.  You have fresh blood or blood clots in your stool.    Summary  Portal vein thrombosis (PVT) is a blockage from a blood clot in the vein that carries blood from your intestines to your liver (portal vein). Any condition that slows down blood flow or increases blood clotting can cause PVT.  PVT often does not cause signs or symptoms but may cause gastrointestinal bleeding, which may cause a backup of blood.  Sometimes, PVT can cause swelling in the abdomen from fluid buildup (ascites), pain in the abdomen, and bleeding from the esophagus or stomach.  Treatment of PVT depends on the cause and severity of your condition. Treatment may include medicines and sometimes surgery.  You may be asked to limit your intake of foods that have vitamin K. Vitamin K may affect the way blood thinners work in the body.    ********************************************************************************************************************    Constipation    Constipation is when a person has fewer than three bowel movements in a week, has difficulty having a bowel movement, or has stools (feces) that are dry, hard, or larger than normal. Constipation may be caused by an underlying condition. It may become worse with age if a person takes certain medicines and does not take in enough fluids.    Follow these instructions at home:      Eating and drinking     Eat foods that have a lot of fiber, such as beans, whole grains, and fresh fruits and vegetables.  Limit foods that are low in fiber and high in fat and processed sugars, such as fried or sweet foods. These include french fries, hamburgers, cookies, candies, and soda.  Drink enough fluid to keep your urine pale yellow.        General instructions    Exercise regularly or as told by your health care provider. Try to do 150 minutes of moderate exercise each week.  Use the bathroom when you have the urge to go. Do not hold it in.  Take over-the-counter and prescription medicines only as told by your health care provider. This includes any fiber supplements.  During bowel movements:     Practice deep breathing while relaxing the lower abdomen.  Practice pelvic floor relaxation.  Watch your condition for any changes. Let your health care provider know about them.  Keep all follow-up visits as told by your health care provider. This is important.    Contact a health care provider if:  You have pain that gets worse.  You have a fever.  You do not have a bowel movement after 4 days.  You vomit.  You are not hungry or you lose weight.  You are bleeding from the opening between the buttocks (anus).  You have thin, pencil-like stools.    Get help right away if:  You have a fever and your symptoms suddenly get worse.  You leak stool or have blood in your stool.  Your abdomen is bloated.  You have severe pain in your abdomen.  You feel dizzy or you faint.    Summary  Constipation is when a person has fewer than three bowel movements in a week, has difficulty having a bowel movement, or has stools (feces) that are dry, hard, or larger than normal.  Eat foods that have a lot of fiber, such as beans, whole grains, and fresh fruits and vegetables.  Drink enough fluid to keep your urine pale yellow.  Take over-the-counter and prescription medicines only as told by your health care provider. This includes any fiber supplements.

## 2023-12-31 NOTE — ED PROVIDER NOTE - CLINICAL SUMMARY MEDICAL DECISION MAKING FREE TEXT BOX
Pt presents with abdominal pain.  Has hx of chronic pain similar to this & known hx of IBS.  CXR unremarkable.  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  Discussed findings with patient and recommended admission to [facility] for further evaluation/treatment (and possibly MRI).  Pt did not wish to be transferred and, instead, shouted at me and at nursing staff to take his IV out so he could go home.  I explained the risks/benefits to the patient and he is choosing to go home.  He refused to sign the AMA form.  He was referred to Gastroenterology and was encouraged to call on Tuesday for a follow up appointment. Pt presents with abdominal pain.  Has hx of chronic pain similar to this & known hx of IBS.  CXR unremarkable.  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  Discussed findings with patient and recommended admission to [facility] for further evaluation/treatment (and possibly MRI).  Pt did not wish to be transferred and, instead, shouted at me and at nursing staff to take his IV out so he could go home.  I explained the risks/benefits to the patient and he is choosing to go home.  He refused to sign the AMA form.  He was referred to Gastroenterology and was encouraged to call on Tuesday for a follow up appointment.

## 2023-12-31 NOTE — ED ADULT NURSE NOTE - OBJECTIVE STATEMENT
patient aox3, breathing even and unlabored c/o abd pain. patient was seen for similar problem here but expressed provider incompetence at this location and is looking for a different diagnosis. patient also expresses provider incompetence with his primary care and gastroenterologist. patient c/o nausea and pain.

## 2023-12-31 NOTE — ED PROVIDER NOTE - CARE PLAN
Principal Discharge DX:	Abdominal pain  Secondary Diagnosis:	Portal vein thrombosis  Secondary Diagnosis:	Constipation   1

## 2023-12-31 NOTE — ED PROVIDER NOTE - OBJECTIVE STATEMENT
Pt is an 80 yo male with PMHx of IBS, chronic constipation, BPH, and HTN who presents with acute on chronic abdominal pain.  The patient states that he suffers from chronic intermittent abdominal pain which has been attributed to IBS in the past.  He states this pain is more severe and more constant than usual.  He has seen 3 different gastroenterologist none of which have been able to give him a clear-cut explanation for his pain other than IBS and he has not been satisfied with his care.  He states the pain has been worse over the past several days.  Pain is mostly in the lower abdomen.  It does not radiate.  He denies any associated vomiting or diarrhea.  He is actually been constipated but this is also a chronic problem for him.  He denies any hematochezia or melena.  He denies any fevers or chills.  He was recently diagnosed with COVID on December 18th and he finished a 5-day course of Paxlovid.  He wants to be tested again to see if he still has COVID, but I explained to him that he may continue to test positive for several weeks, and it does not necessarily mean that he is actively ill with COVID or contagious.  He is currently denying any chest pain or shortness of breath but he continues to have a nonproductive cough.  He has had chronic urinary issues as well which is attributed to BPH.  He has trouble starting the stream and had some dysuria yesterday but denies any hematuria.  He hasn't had a colonoscopy for several years, but he does have a hx of diverticulosis.  He has no hx of liver disease and denies ETOH abuse. Pt is an 82 yo male with PMHx of IBS, chronic constipation, BPH, and HTN who presents with acute on chronic abdominal pain.  The patient states that he suffers from chronic intermittent abdominal pain which has been attributed to IBS in the past.  He states this pain is more severe and more constant than usual.  He has seen 3 different gastroenterologist none of which have been able to give him a clear-cut explanation for his pain other than IBS and he has not been satisfied with his care.  He states the pain has been worse over the past several days.  Pain is mostly in the lower abdomen.  It does not radiate.  He denies any associated vomiting or diarrhea.  He is actually been constipated but this is also a chronic problem for him.  He denies any hematochezia or melena.  He denies any fevers or chills.  He was recently diagnosed with COVID on December 18th and he finished a 5-day course of Paxlovid.  He wants to be tested again to see if he still has COVID, but I explained to him that he may continue to test positive for several weeks, and it does not necessarily mean that he is actively ill with COVID or contagious.  He is currently denying any chest pain or shortness of breath but he continues to have a nonproductive cough.  He has had chronic urinary issues as well which is attributed to BPH.  He has trouble starting the stream and had some dysuria yesterday but denies any hematuria.  He hasn't had a colonoscopy for several years, but he does have a hx of diverticulosis.  He has no hx of liver disease and denies ETOH abuse.

## 2023-12-31 NOTE — ED ADULT NURSE NOTE - NSFALLUNIVINTERV_ED_ALL_ED
Bed/Stretcher in lowest position, wheels locked, appropriate side rails in place/Call bell, personal items and telephone in reach/Instruct patient to call for assistance before getting out of bed/chair/stretcher/Non-slip footwear applied when patient is off stretcher/[place] to call system/Physically safe environment - no spills, clutter or unnecessary equipment/Purposeful proactive rounding/Room/bathroom lighting operational, light cord in reach Bed/Stretcher in lowest position, wheels locked, appropriate side rails in place/Call bell, personal items and telephone in reach/Instruct patient to call for assistance before getting out of bed/chair/stretcher/Non-slip footwear applied when patient is off stretcher/[place] to call system/Physically safe environment - no spills, clutter or unnecessary equipment/Purposeful proactive rounding/Room/bathroom lighting operational, light cord in reach

## 2024-01-02 DIAGNOSIS — K58.9 IRRITABLE BOWEL SYNDROME WITHOUT DIARRHEA: ICD-10-CM

## 2024-01-02 DIAGNOSIS — Z53.29 PROCEDURE AND TREATMENT NOT CARRIED OUT BECAUSE OF PATIENT'S DECISION FOR OTHER REASONS: ICD-10-CM

## 2024-01-02 DIAGNOSIS — I81 PORTAL VEIN THROMBOSIS: ICD-10-CM

## 2024-01-02 DIAGNOSIS — Z20.822 CONTACT WITH AND (SUSPECTED) EXPOSURE TO COVID-19: ICD-10-CM

## 2024-01-02 DIAGNOSIS — K59.00 CONSTIPATION, UNSPECIFIED: ICD-10-CM

## 2024-01-02 DIAGNOSIS — R10.9 UNSPECIFIED ABDOMINAL PAIN: ICD-10-CM

## 2024-01-02 DIAGNOSIS — N40.0 BENIGN PROSTATIC HYPERPLASIA WITHOUT LOWER URINARY TRACT SYMPTOMS: ICD-10-CM

## 2024-01-02 DIAGNOSIS — G89.29 OTHER CHRONIC PAIN: ICD-10-CM

## 2024-01-02 DIAGNOSIS — I10 ESSENTIAL (PRIMARY) HYPERTENSION: ICD-10-CM

## 2024-01-05 ENCOUNTER — EMERGENCY (EMERGENCY)
Facility: HOSPITAL | Age: 84
LOS: 1 days | Discharge: HOME | End: 2024-01-05
Attending: EMERGENCY MEDICINE | Admitting: EMERGENCY MEDICINE
Payer: MEDICARE

## 2024-01-05 VITALS
DIASTOLIC BLOOD PRESSURE: 67 MMHG | RESPIRATION RATE: 16 BRPM | TEMPERATURE: 98 F | HEART RATE: 62 BPM | SYSTOLIC BLOOD PRESSURE: 107 MMHG | OXYGEN SATURATION: 95 %

## 2024-01-05 VITALS
DIASTOLIC BLOOD PRESSURE: 66 MMHG | OXYGEN SATURATION: 95 % | HEART RATE: 70 BPM | TEMPERATURE: 98 F | WEIGHT: 134.92 LBS | RESPIRATION RATE: 18 BRPM | SYSTOLIC BLOOD PRESSURE: 102 MMHG

## 2024-01-05 DIAGNOSIS — R10.84 GENERALIZED ABDOMINAL PAIN: ICD-10-CM

## 2024-01-05 DIAGNOSIS — Z98.890 OTHER SPECIFIED POSTPROCEDURAL STATES: Chronic | ICD-10-CM

## 2024-01-05 DIAGNOSIS — Z90.89 ACQUIRED ABSENCE OF OTHER ORGANS: Chronic | ICD-10-CM

## 2024-01-05 DIAGNOSIS — Z90.49 ACQUIRED ABSENCE OF OTHER SPECIFIED PARTS OF DIGESTIVE TRACT: Chronic | ICD-10-CM

## 2024-01-05 DIAGNOSIS — K59.09 OTHER CONSTIPATION: ICD-10-CM

## 2024-01-05 PROCEDURE — 74176 CT ABD & PELVIS W/O CONTRAST: CPT | Mod: 26

## 2024-01-05 PROCEDURE — 99284 EMERGENCY DEPT VISIT MOD MDM: CPT

## 2024-01-05 RX ORDER — FAMOTIDINE 10 MG/ML
20 INJECTION INTRAVENOUS ONCE
Refills: 0 | Status: COMPLETED | OUTPATIENT
Start: 2024-01-05 | End: 2024-01-05

## 2024-01-05 RX ORDER — ONDANSETRON 8 MG/1
8 TABLET, FILM COATED ORAL ONCE
Refills: 0 | Status: COMPLETED | OUTPATIENT
Start: 2024-01-05 | End: 2024-01-05

## 2024-01-05 RX ADMIN — ONDANSETRON 8 MILLIGRAM(S): 8 TABLET, FILM COATED ORAL at 10:03

## 2024-01-05 RX ADMIN — FAMOTIDINE 20 MILLIGRAM(S): 10 INJECTION INTRAVENOUS at 10:02

## 2024-01-05 RX ADMIN — Medication 30 MILLILITER(S): at 10:03

## 2024-01-05 NOTE — ED PROVIDER NOTE - PATIENT PORTAL LINK FT
You can access the FollowMyHealth Patient Portal offered by [facility] by registering at the following website: [email or web address]. By joining [employer]’s FollowMyHealth portal, you will also be able to view your health information using other applications (apps) compatible with our system. You can access the FollowMyHealth Patient Portal offered by [facility] by registering at the following website: [email or web address]. By joining [employer]’s FollowMyHealth portal, you will also be able to view your health information using other applications (apps) compatible with our system.

## 2024-01-05 NOTE — ED PROVIDER NOTE - CARE PROVIDER_API CALL
Brian Pennington  Gastroenterology  [address] [address]  Phone: [phone]  Fax: [phone]  Follow Up Time: 7-10 Days   Brian Pennington  Gastroenterology  [address] [address]  Phone: [phone]  Fax: [phone]  Follow Up Time: 7-10 Days

## 2024-01-05 NOTE — ED PROVIDER NOTE - CCCP TRG CHIEF CMPLNT
abdominal pain Azelaic Acid Counseling: Patient counseled that medicine may cause skin irritation and to avoid applying near the eyes.  In the event of skin irritation, the patient was advised to reduce the amount of the drug applied or use it less frequently.   The patient verbalized understanding of the proper use and possible adverse effects of azelaic acid.  All of the patient's questions and concerns were addressed.

## 2024-01-05 NOTE — ED PROVIDER NOTE - CLINICAL SUMMARY MEDICAL DECISION MAKING FREE TEXT BOX
Physical exam unremarkable.  Abdomen soft nontender nondistended.  I ordered p.o. GI cocktail, which she reports gave him some relief.  He has had chronic constipation and last night took 2 senna laxatives which produced 2-3 bowel movements this morning.  No diarrhea, no bloody stools.  His CT shows no acute pathology, copious stool in the colon.  But no signs of ileus or obstruction, no signs of infection.  Study is limited by lack of contrast material.  I recommended he continue his GI follow-up, make an appointment for his MRI follow-up with outpatient GI.  Will prescribe GI meds.  Stable for [facility]. Physical exam unremarkable.  Abdomen soft nontender nondistended.  I ordered p.o. GI cocktail, which she reports gave him some relief.  He has had chronic constipation and last night took 2 senna laxatives which produced 2-3 bowel movements this morning.  No diarrhea, no bloody stools.  His CT shows no acute pathology, copious stool in the colon.  But no signs of ileus or obstruction, no signs of infection.  Study is limited by lack of contrast material.  I recommended he continue his GI follow-up, make an appointment for his MRI follow-up with outpatient GI.  Will prescribe GI meds.  Stable for [facility].

## 2024-01-05 NOTE — ED ADULT NURSE NOTE - OBJECTIVE STATEMENT
Pt returns to ED c/o chronic abd pain with NVD. Pt recently here h/o portal thrombus. Denies CP SOB. Appears in NAD.

## 2024-01-05 NOTE — ED PROVIDER NOTE - OBJECTIVE STATEMENT
83-year-old male here with complaints of generalized vague abdominal pain, nausea, no vomiting, has a history of recently diagnosed portal vein thrombosis on CT that was on 12/31/2023, at that time he was instructed to follow-up with an outpatient GI Dr. Lee.  He saw Dr. Kocher really on [address] who made arrangements for him to get an MRI of the abdomen, that workup is still pending.  He states today he got up had a few bites of bananas and had an abnormal sensation in the mid abdomen, associated with mild nausea which is ongoing.  On arrival to the ED and recommended he have blood work, GI cocktail and IV contrast CAT scan to evaluate status of portal vein thrombosis.  He refused stating he was not going to allow me to place order an IV line, and he did not have to give me a reason.  Physical exam unremarkable.  Abdomen soft nontender nondistended.  I ordered p.o. GI cocktail, which she reports gave him some relief.  He has had chronic constipation and last night took 2 senna laxatives which produced 2-3 bowel movements this morning.  No diarrhea, no bloody stools.  His CT shows no acute pathology, copious stool in the colon.  But no signs of ileus or obstruction, no signs of infection.  Study is limited by lack of contrast material.  I recommended he continue his GI follow-up, make an appointment for his MRI follow-up with outpatient GI.  Will prescribe GI meds.  Stable for [place] home. 83-year-old male here with complaints of generalized vague abdominal pain, nausea, no vomiting, has a history of recently diagnosed portal vein thrombosis on CT that was on 12/31/2023, at that time he was instructed to follow-up with an outpatient GI Dr. Lee.  He saw Dr. Kocher really on [address] who made arrangements for him to get an MRI of the abdomen, that workup is still pending.  He states today he got up had a few bites of bananas and had an abnormal sensation in the mid abdomen, associated with mild nausea which is ongoing.  On arrival to the ED and recommended he have blood work, GI cocktail and IV contrast CAT scan to evaluate status of portal vein thrombosis.  He refused stating he was not going to allow me to place order an IV line, and he did not have to give me a reason.  Physical exam unremarkable.  Abdomen soft nontender nondistended.  I ordered p.o. GI cocktail, which she reports gave him some relief.  He has had chronic constipation and last night took 2 senna laxatives which produced 2-3 bowel movements this morning.  No diarrhea, no bloody stools.  His CT shows no acute pathology, copious stool in the colon.  But no signs of ileus or obstruction, no signs of infection.  Study is limited by lack of contrast material.  I recommended he continue his GI follow-up, make an appointment for his MRI follow-up with outpatient GI.  Will prescribe GI meds.  Stable for [place] home. 83-year-old male here with complaints of generalized vague abdominal pain, nausea, no vomiting, has a history of recently diagnosed portal vein thrombosis on CT that was on 12/31/2023, at that time he was instructed to follow-up with an outpatient GI Dr. Lee.  He saw Dr. Kocher really on [address] who made arrangements for him to get an MRI of the abdomen, that workup is still pending.  He states today he got up had a few bites of bananas and had an abnormal sensation in the mid abdomen, associated with mild nausea which is ongoing.  On arrival to the ED and recommended he have blood work, GI cocktail and IV contrast CAT scan to evaluate status of portal vein thrombosis.  He refused stating he was not going to allow me to place order an IV line, and he did not have to give me a reason. He agreed to a non contrast CT scan. 83-year-old male here with complaints of generalized vague abdominal pain, nausea, no vomiting, has a history of recently diagnosed portal vein thrombosis on CT that was on 12/31/2023, at that time he was instructed to follow-up with an outpatient GI Dr. Lee.  He saw Dr. Crawford on [address] who made arrangements for him to get an MRI of the abdomen, that workup is still pending.  He states today he got up had a few bites of bananas and had an abnormal sensation in the mid abdomen, associated with mild nausea which is ongoing.  On arrival to the ED and recommended he have blood work, GI cocktail and IV contrast CAT scan to evaluate status of portal vein thrombosis.  He refused stating he was not going to allow me to place order an IV line, and he did not have to give me a reason. He agreed to a non contrast CT scan.

## 2024-01-05 NOTE — ED ADULT NURSE NOTE - NSFALLUNIVINTERV_ED_ALL_ED
Bed/Stretcher in lowest position, wheels locked, appropriate side rails in place/Call bell, personal items and telephone in reach/Instruct patient to call for assistance before getting out of bed/chair/stretcher/Non-slip footwear applied when patient is off stretcher/[place] to call system/Physically safe environment - no spills, clutter or unnecessary equipment/Purposeful proactive rounding/Room/bathroom lighting operational, light cord in reach Bed/Stretcher in lowest position, wheels locked, appropriate side rails in place/Call bell, personal items and telephone in reach/Instruct patient to call for assistance before getting out of bed/chair/stretcher/Non-slip footwear applied when patient is off stretcher/[place] to call system/Physically safe environment - no spills, clutter or unnecessary equipment/Purposeful proactive rounding/Room/bathroom lighting operational, light cord in reach

## 2024-01-24 ENCOUNTER — EMERGENCY (EMERGENCY)
Facility: HOSPITAL | Age: 84
LOS: 1 days | Discharge: HOME | End: 2024-01-24
Attending: EMERGENCY MEDICINE | Admitting: EMERGENCY MEDICINE
Payer: MEDICARE

## 2024-01-24 VITALS
RESPIRATION RATE: 16 BRPM | HEIGHT: 70 IN | TEMPERATURE: 98 F | HEART RATE: 65 BPM | SYSTOLIC BLOOD PRESSURE: 150 MMHG | DIASTOLIC BLOOD PRESSURE: 90 MMHG | OXYGEN SATURATION: 98 %

## 2024-01-24 DIAGNOSIS — K59.00 CONSTIPATION, UNSPECIFIED: ICD-10-CM

## 2024-01-24 DIAGNOSIS — Z90.89 ACQUIRED ABSENCE OF OTHER ORGANS: Chronic | ICD-10-CM

## 2024-01-24 DIAGNOSIS — R10.32 LEFT LOWER QUADRANT PAIN: ICD-10-CM

## 2024-01-24 DIAGNOSIS — K57.90 DIVERTICULOSIS OF INTESTINE, PART UNSPECIFIED, WITHOUT PERFORATION OR ABSCESS WITHOUT BLEEDING: ICD-10-CM

## 2024-01-24 DIAGNOSIS — Z90.49 ACQUIRED ABSENCE OF OTHER SPECIFIED PARTS OF DIGESTIVE TRACT: Chronic | ICD-10-CM

## 2024-01-24 DIAGNOSIS — N40.0 BENIGN PROSTATIC HYPERPLASIA WITHOUT LOWER URINARY TRACT SYMPTOMS: ICD-10-CM

## 2024-01-24 DIAGNOSIS — Z98.890 OTHER SPECIFIED POSTPROCEDURAL STATES: Chronic | ICD-10-CM

## 2024-01-24 DIAGNOSIS — I10 ESSENTIAL (PRIMARY) HYPERTENSION: ICD-10-CM

## 2024-01-24 PROBLEM — Z78.9 OTHER SPECIFIED HEALTH STATUS: Chronic | Status: ACTIVE | Noted: 2023-12-18

## 2024-01-24 LAB
APPEARANCE UR: CLEAR — SIGNIFICANT CHANGE UP
BILIRUB UR-MCNC: NEGATIVE — SIGNIFICANT CHANGE UP
COLOR SPEC: YELLOW — SIGNIFICANT CHANGE UP
DIFF PNL FLD: NEGATIVE — SIGNIFICANT CHANGE UP
GLUCOSE UR QL: NEGATIVE MG/DL — SIGNIFICANT CHANGE UP
KETONES UR-MCNC: ABNORMAL MG/DL
LEUKOCYTE ESTERASE UR-ACNC: NEGATIVE — SIGNIFICANT CHANGE UP
NITRITE UR-MCNC: NEGATIVE — SIGNIFICANT CHANGE UP
PH UR: 7 — SIGNIFICANT CHANGE UP (ref 5–8)
PROT UR-MCNC: ABNORMAL MG/DL
RBC CASTS # UR COMP ASSIST: 0 /HPF — SIGNIFICANT CHANGE UP (ref 0–4)
SP GR SPEC: 1.02 — SIGNIFICANT CHANGE UP (ref 1–1.03)
UROBILINOGEN FLD QL: 1 MG/DL — SIGNIFICANT CHANGE UP (ref 0.2–1)
WBC UR QL: 0 /HPF — SIGNIFICANT CHANGE UP (ref 0–5)

## 2024-01-24 PROCEDURE — 74176 CT ABD & PELVIS W/O CONTRAST: CPT | Mod: 26

## 2024-01-24 PROCEDURE — 99284 EMERGENCY DEPT VISIT MOD MDM: CPT

## 2024-01-24 NOTE — ED PROVIDER NOTE - NSICDXPASTMEDICALHX_GEN_ALL_CORE_FT
PAST MEDICAL HISTORY:  BPH (benign prostatic hyperplasia)     Diverticulosis     Hemorrhoids     Hypertension     IBD (inflammatory bowel disease)     No pertinent past medical history     Stage 3 chronic kidney disease

## 2024-01-24 NOTE — ED PROVIDER NOTE - OBJECTIVE STATEMENT
83-year-old male with history of BPH, hypertension, diverticulosis, sent here by urgent care for CT scan to rule out diverticulitis.  He is had 1 to 2 days of left lower quadrant abdominal pain, some constipation, no nausea vomiting or fever.  He is tolerating p.o.  He is concerned that he might of developed diverticulitis after eating nondairy ice cream, 2 pints that he consumed last night

## 2024-01-24 NOTE — ED PROVIDER NOTE - CARE PROVIDER_API CALL
Brian Pennington  Gastroenterology  [address] [address]  Phone: [phone]  Fax: [phone]  Follow Up Time: 7-10 Days

## 2024-01-24 NOTE — ED PROVIDER NOTE - PATIENT PORTAL LINK FT
You can access the FollowMyHealth Patient Portal offered by [facility] by registering at the following website: [email or web address]. By joining [employer]’s FollowMyHealth portal, you will also be able to view your health information using other applications (apps) compatible with our system.

## 2024-01-24 NOTE — ED PROVIDER NOTE - CLINICAL SUMMARY MEDICAL DECISION MAKING FREE TEXT BOX
CT negative, UA negative, patient refused labs and IV line.  Stable for DC home.  Patient refused to wait for discharge instructions and follow-up information.

## 2024-01-31 ENCOUNTER — EMERGENCY (EMERGENCY)
Facility: HOSPITAL | Age: 84
LOS: 1 days | Discharge: LEFT BEFORE DISCHARGE | End: 2024-01-31
Admitting: EMERGENCY MEDICINE
Payer: MEDICARE

## 2024-01-31 VITALS
OXYGEN SATURATION: 98 % | SYSTOLIC BLOOD PRESSURE: 130 MMHG | HEART RATE: 70 BPM | DIASTOLIC BLOOD PRESSURE: 78 MMHG | RESPIRATION RATE: 17 BRPM

## 2024-01-31 VITALS
OXYGEN SATURATION: 96 % | SYSTOLIC BLOOD PRESSURE: 130 MMHG | RESPIRATION RATE: 16 BRPM | DIASTOLIC BLOOD PRESSURE: 80 MMHG | HEART RATE: 67 BPM | HEIGHT: 70 IN | TEMPERATURE: 98 F

## 2024-01-31 DIAGNOSIS — I10 ESSENTIAL (PRIMARY) HYPERTENSION: ICD-10-CM

## 2024-01-31 DIAGNOSIS — Z98.890 OTHER SPECIFIED POSTPROCEDURAL STATES: Chronic | ICD-10-CM

## 2024-01-31 DIAGNOSIS — K57.90 DIVERTICULOSIS OF INTESTINE, PART UNSPECIFIED, WITHOUT PERFORATION OR ABSCESS WITHOUT BLEEDING: ICD-10-CM

## 2024-01-31 DIAGNOSIS — N40.0 BENIGN PROSTATIC HYPERPLASIA WITHOUT LOWER URINARY TRACT SYMPTOMS: ICD-10-CM

## 2024-01-31 DIAGNOSIS — Z90.89 ACQUIRED ABSENCE OF OTHER ORGANS: Chronic | ICD-10-CM

## 2024-01-31 DIAGNOSIS — R10.32 LEFT LOWER QUADRANT PAIN: ICD-10-CM

## 2024-01-31 DIAGNOSIS — Z90.49 ACQUIRED ABSENCE OF OTHER SPECIFIED PARTS OF DIGESTIVE TRACT: Chronic | ICD-10-CM

## 2024-01-31 DIAGNOSIS — Z53.29 PROCEDURE AND TREATMENT NOT CARRIED OUT BECAUSE OF PATIENT'S DECISION FOR OTHER REASONS: ICD-10-CM

## 2024-01-31 LAB
ALBUMIN SERPL ELPH-MCNC: 3.6 G/DL — SIGNIFICANT CHANGE UP (ref 3.4–5)
ALP SERPL-CCNC: 97 U/L — SIGNIFICANT CHANGE UP (ref 40–120)
ALT FLD-CCNC: 18 U/L — SIGNIFICANT CHANGE UP (ref 12–42)
ANION GAP SERPL CALC-SCNC: 7 MMOL/L — LOW (ref 9–16)
APPEARANCE UR: CLEAR — SIGNIFICANT CHANGE UP
AST SERPL-CCNC: 19 U/L — SIGNIFICANT CHANGE UP (ref 15–37)
BASOPHILS # BLD AUTO: 0.03 K/UL — SIGNIFICANT CHANGE UP (ref 0–0.2)
BASOPHILS NFR BLD AUTO: 0.5 % — SIGNIFICANT CHANGE UP (ref 0–2)
BILIRUB SERPL-MCNC: 0.7 MG/DL — SIGNIFICANT CHANGE UP (ref 0.2–1.2)
BILIRUB UR-MCNC: NEGATIVE — SIGNIFICANT CHANGE UP
BUN SERPL-MCNC: 28 MG/DL — HIGH (ref 7–23)
CALCIUM SERPL-MCNC: 9.2 MG/DL — SIGNIFICANT CHANGE UP (ref 8.5–10.5)
CHLORIDE SERPL-SCNC: 100 MMOL/L — SIGNIFICANT CHANGE UP (ref 96–108)
CO2 SERPL-SCNC: 28 MMOL/L — SIGNIFICANT CHANGE UP (ref 22–31)
COLOR SPEC: SIGNIFICANT CHANGE UP
CREAT SERPL-MCNC: 1.53 MG/DL — HIGH (ref 0.5–1.3)
DIFF PNL FLD: NEGATIVE — SIGNIFICANT CHANGE UP
EGFR: 45 ML/MIN/1.73M2 — LOW
EOSINOPHIL # BLD AUTO: 0.44 K/UL — SIGNIFICANT CHANGE UP (ref 0–0.5)
EOSINOPHIL NFR BLD AUTO: 6.8 % — HIGH (ref 0–6)
GLUCOSE SERPL-MCNC: 109 MG/DL — HIGH (ref 70–99)
GLUCOSE UR QL: NEGATIVE MG/DL — SIGNIFICANT CHANGE UP
GRAN CASTS # UR COMP ASSIST: PRESENT
HCT VFR BLD CALC: 41.8 % — SIGNIFICANT CHANGE UP (ref 39–50)
HGB BLD-MCNC: 13.8 G/DL — SIGNIFICANT CHANGE UP (ref 13–17)
IMM GRANULOCYTES NFR BLD AUTO: 0.5 % — SIGNIFICANT CHANGE UP (ref 0–0.9)
KETONES UR-MCNC: ABNORMAL MG/DL
LEUKOCYTE ESTERASE UR-ACNC: NEGATIVE — SIGNIFICANT CHANGE UP
LYMPHOCYTES # BLD AUTO: 2.46 K/UL — SIGNIFICANT CHANGE UP (ref 1–3.3)
LYMPHOCYTES # BLD AUTO: 37.9 % — SIGNIFICANT CHANGE UP (ref 13–44)
MCHC RBC-ENTMCNC: 32.8 PG — SIGNIFICANT CHANGE UP (ref 27–34)
MCHC RBC-ENTMCNC: 33 GM/DL — SIGNIFICANT CHANGE UP (ref 32–36)
MCV RBC AUTO: 99.3 FL — SIGNIFICANT CHANGE UP (ref 80–100)
MONOCYTES # BLD AUTO: 0.36 K/UL — SIGNIFICANT CHANGE UP (ref 0–0.9)
MONOCYTES NFR BLD AUTO: 5.5 % — SIGNIFICANT CHANGE UP (ref 2–14)
NEUTROPHILS # BLD AUTO: 3.17 K/UL — SIGNIFICANT CHANGE UP (ref 1.8–7.4)
NEUTROPHILS NFR BLD AUTO: 48.8 % — SIGNIFICANT CHANGE UP (ref 43–77)
NITRITE UR-MCNC: NEGATIVE — SIGNIFICANT CHANGE UP
NRBC # BLD: 0 /100 WBCS — SIGNIFICANT CHANGE UP (ref 0–0)
PH UR: 6.5 — SIGNIFICANT CHANGE UP (ref 5–8)
PLATELET # BLD AUTO: 222 K/UL — SIGNIFICANT CHANGE UP (ref 150–400)
POTASSIUM SERPL-MCNC: 4 MMOL/L — SIGNIFICANT CHANGE UP (ref 3.5–5.3)
POTASSIUM SERPL-SCNC: 4 MMOL/L — SIGNIFICANT CHANGE UP (ref 3.5–5.3)
PROT SERPL-MCNC: 7.1 G/DL — SIGNIFICANT CHANGE UP (ref 6.4–8.2)
PROT UR-MCNC: ABNORMAL MG/DL
RBC # BLD: 4.21 M/UL — SIGNIFICANT CHANGE UP (ref 4.2–5.8)
RBC # FLD: 13.1 % — SIGNIFICANT CHANGE UP (ref 10.3–14.5)
RBC CASTS # UR COMP ASSIST: 2 /HPF — SIGNIFICANT CHANGE UP (ref 0–4)
SODIUM SERPL-SCNC: 135 MMOL/L — SIGNIFICANT CHANGE UP (ref 132–145)
SP GR SPEC: 1.03 — SIGNIFICANT CHANGE UP (ref 1–1.03)
UROBILINOGEN FLD QL: 1 MG/DL — SIGNIFICANT CHANGE UP (ref 0.2–1)
WBC # BLD: 6.49 K/UL — SIGNIFICANT CHANGE UP (ref 3.8–10.5)
WBC # FLD AUTO: 6.49 K/UL — SIGNIFICANT CHANGE UP (ref 3.8–10.5)
WBC UR QL: 0 /HPF — SIGNIFICANT CHANGE UP (ref 0–5)

## 2024-01-31 PROCEDURE — 99285 EMERGENCY DEPT VISIT HI MDM: CPT

## 2024-01-31 PROCEDURE — 74177 CT ABD & PELVIS W/CONTRAST: CPT | Mod: 26

## 2024-01-31 RX ORDER — KETOROLAC TROMETHAMINE 30 MG/ML
15 SYRINGE (ML) INJECTION ONCE
Refills: 0 | Status: DISCONTINUED | OUTPATIENT
Start: 2024-01-31 | End: 2024-01-31

## 2024-01-31 RX ORDER — ONDANSETRON 8 MG/1
4 TABLET, FILM COATED ORAL ONCE
Refills: 0 | Status: COMPLETED | OUTPATIENT
Start: 2024-01-31 | End: 2024-01-31

## 2024-01-31 RX ADMIN — ONDANSETRON 4 MILLIGRAM(S): 8 TABLET, FILM COATED ORAL at 07:38

## 2024-01-31 RX ADMIN — Medication 15 MILLIGRAM(S): at 07:38

## 2024-01-31 NOTE — ED PROVIDER NOTE - PHYSICAL EXAMINATION
Physical Exam    Vital Signs: I have reviewed the initial vital signs.  Constitutional: well-appearing, appears stated age  Eyes: PERRLA, EOM intact, RAPD absent, and symmetrical lids.  ENT: Neck supple with no adenopathy, moist MM.  Cardiovascular: regular rate, regular rhythm, well-perfused extremities  Respiratory: unlabored respiratory effort, clear to auscultation bilaterally  Gastrointestinal: soft, +LLQ ttp, no guarding, no rebound, no cvat b/l  Musculoskeletal: supple neck, no lower extremity edema  Integumentary: warm, dry, no rash  Neurologic: extremities’ motor and sensory functions grossly intact  Psychiatric: A&Ox3, appropriate mood, appropriate affect

## 2024-01-31 NOTE — ED PROVIDER NOTE - OBJECTIVE STATEMENT
83-year-old male with history of BPH, hypertension, diverticulosis here with LLQ abd pain worsening since he was seen in the ED 6 d ago with negative abd CT scan, pt reports that he had worsening nausea w/o vomiting and worsening LLQ abd pain with no change stools. Denies dysuria, urgency, frequency.    I have reviewed available current nursing and previous documentation of past medical, surgical, family, and/or social history.

## 2024-01-31 NOTE — ED PROVIDER NOTE - NSFOLLOWUPINSTRUCTIONS_ED_ALL_ED_FT
Abdominal Pain    Many things can cause abdominal pain. Usually, abdominal pain is not caused by a disease and will improve without treatment. Your health care provider will do a physical exam and possibly order blood tests and imaging to help determine the seriousness of your pain. However, in many cases, no cause may be found and you may need further testing as an outpatient. Monitor your abdominal pain for any changes.     SEEK IMMEDIATE MEDICAL CARE IF YOU HAVE THE FOLLOWING SYMPTOMS: worsening abdominal pain, vomiting, diarrhea, inability to have bowel movements or pass gas, black or bloody stool, fever accompanying chest pain or back pain, or dizziness/lightheadedness. The patient wishes to leave against medical advice.  I have discussed the risks, benefits and alternatives (including the possibility of worsening of disease, pain, permanent disability, and/or death) with the patient and his/her family (if available).  The patient voices understanding of these risks, benefits, and alternatives and still wishes to sign out against medical advice.  The patient is awake, alert, oriented  x 3 and has demonstrated capacity to refuse/direct care.  I have advised the patient that they can and should return immediately should they develop any worse/different/additional symptoms, or if they change their mind and want to continue their care.      Abdominal Pain    Many things can cause abdominal pain. Usually, abdominal pain is not caused by a disease and will improve without treatment. Your health care provider will do a physical exam and possibly order blood tests and imaging to help determine the seriousness of your pain. However, in many cases, no cause may be found and you may need further testing as an outpatient. Monitor your abdominal pain for any changes.     SEEK IMMEDIATE MEDICAL CARE IF YOU HAVE THE FOLLOWING SYMPTOMS: worsening abdominal pain, vomiting, diarrhea, inability to have bowel movements or pass gas, black or bloody stool, fever accompanying chest pain or back pain, or dizziness/lightheadedness.

## 2024-01-31 NOTE — ED ADULT NURSE NOTE - NSFALLUNIVINTERV_ED_ALL_ED
Bed/Stretcher in lowest position, wheels locked, appropriate side rails in place/Call bell, personal items and telephone in reach/Instruct patient to call for assistance before getting out of bed/chair/stretcher/Non-slip footwear applied when patient is off stretcher/Home to call system/Physically safe environment - no spills, clutter or unnecessary equipment/Purposeful proactive rounding/Room/bathroom lighting operational, light cord in reach

## 2024-01-31 NOTE — ED ADULT TRIAGE NOTE - STATUS:
Glory Winchester called requesting a refill on the following medications:  Requested Prescriptions     Pending Prescriptions Disp Refills    tamsulosin (FLOMAX) 0.4 MG capsule [Pharmacy Med Name: TAMSULOSIN 0.4 MG CAPS 0.4 Capsule] 30 capsule 3     Sig: TAKE 1 CAPSULE BY MOUTH DAILY TO [address] [place] [address] verified:  .pv      Date of last visit: 09/13/2021  Date of next visit (if applicable): 5/32/1182 Intact

## 2024-01-31 NOTE — ED ADULT TRIAGE NOTE - CHIEF COMPLAINT QUOTE
Pt BIBA c/o abdominal pain. Hx of IBS and BPH. States been ongoing for awhile, endorsing nausea and sob.

## 2024-01-31 NOTE — ED PROVIDER NOTE - PATIENT PORTAL LINK FT
You can access the FollowMyHealth Patient Portal offered by [facility] by registering at the following website: [email or web address]. By joining "[employer]’s FollowMyHealth portal, you will also be able to view your health information using other applications (apps) compatible with our system.

## 2024-01-31 NOTE — ED PROVIDER NOTE - CLINICAL SUMMARY MEDICAL DECISION MAKING FREE TEXT BOX
well appearing pt with similar visits in the past reports that he had worsening LLQ abd pain with nausea w/o vomiting ongoing for 6 d in duration with LLQ ttp on exam, pt is concerned for diverticulitis negative CT scan 6 d ago, but pt insistent that pain is getting worse and accepts the potential risk of multiple ct scans and ionizing radiation, will r/o diverticulitis, coelitis and less likely appendicitis, plan: cbc, cmp, ua, ctap well appearing pt with similar visits in the past reports that he had worsening LLQ abd pain with nausea w/o vomiting ongoing for 6 d in duration with LLQ ttp on exam, pt is concerned for diverticulitis negative CT scan 6 d ago, but pt insistent that pain is getting worse and accepts the potential risk of multiple ct scans and ionizing radiation, will r/o diverticulitis, coelitis and less likely appendicitis, plan: cbc, cmp, ua, ctap    The patient wishes to leave against medical advice.  I have discussed the risks, benefits and alternatives (including the possibility of worsening of disease, pain, permanent disability, and/or death) with the patient and his/her family (if available).  The patient voices understanding of these risks, benefits, and alternatives and still wishes to sign out against medical advice.  The patient is awake, alert, oriented  x 3 and has demonstrated capacity to refuse/direct care.  I have advised the patient that they can and should return immediately should they develop any worse/different/additional symptoms, or if they change their mind and want to continue their care.

## 2024-01-31 NOTE — ED ADULT NURSE NOTE - NSFALLRISKASMT_ED_ALL_ED_DT
31-Jan-2024 07:21 Mauc Instructions: By selecting yes to the question below the MAUC number will be added into the note.  This will be calculated automatically based on the diagnosis chosen, the size entered, the body zone selected (H,M,L) and the specific indications you chose. You will also have the option to override the Mohs AUC if you disagree with the automatically calculated number and this option is found in the Case Summary tab.

## 2024-02-01 ENCOUNTER — EMERGENCY (EMERGENCY)
Facility: HOSPITAL | Age: 84
LOS: 1 days | Discharge: HOME | End: 2024-02-01
Attending: EMERGENCY MEDICINE | Admitting: EMERGENCY MEDICINE
Payer: MEDICARE

## 2024-02-01 VITALS
TEMPERATURE: 96 F | WEIGHT: 160.06 LBS | HEART RATE: 69 BPM | SYSTOLIC BLOOD PRESSURE: 117 MMHG | DIASTOLIC BLOOD PRESSURE: 77 MMHG | HEIGHT: 70 IN | RESPIRATION RATE: 18 BRPM | OXYGEN SATURATION: 94 %

## 2024-02-01 DIAGNOSIS — Z98.890 OTHER SPECIFIED POSTPROCEDURAL STATES: Chronic | ICD-10-CM

## 2024-02-01 DIAGNOSIS — Z90.49 ACQUIRED ABSENCE OF OTHER SPECIFIED PARTS OF DIGESTIVE TRACT: Chronic | ICD-10-CM

## 2024-02-01 DIAGNOSIS — Z20.822 CONTACT WITH AND (SUSPECTED) EXPOSURE TO COVID-19: ICD-10-CM

## 2024-02-01 DIAGNOSIS — Z90.89 ACQUIRED ABSENCE OF OTHER ORGANS: Chronic | ICD-10-CM

## 2024-02-01 DIAGNOSIS — J10.1 INFLUENZA DUE TO OTHER IDENTIFIED INFLUENZA VIRUS WITH OTHER RESPIRATORY MANIFESTATIONS: ICD-10-CM

## 2024-02-01 DIAGNOSIS — Z87.19 PERSONAL HISTORY OF OTHER DISEASES OF THE DIGESTIVE SYSTEM: ICD-10-CM

## 2024-02-01 DIAGNOSIS — R10.32 LEFT LOWER QUADRANT PAIN: ICD-10-CM

## 2024-02-01 LAB
ALBUMIN SERPL ELPH-MCNC: 3.3 G/DL — LOW (ref 3.4–5)
ALP SERPL-CCNC: 92 U/L — SIGNIFICANT CHANGE UP (ref 40–120)
ALT FLD-CCNC: 14 U/L — SIGNIFICANT CHANGE UP (ref 12–42)
ANION GAP SERPL CALC-SCNC: 5 MMOL/L — LOW (ref 9–16)
APPEARANCE UR: CLEAR — SIGNIFICANT CHANGE UP
AST SERPL-CCNC: 16 U/L — SIGNIFICANT CHANGE UP (ref 15–37)
BACTERIA # UR AUTO: PRESENT /HPF — SIGNIFICANT CHANGE UP
BASOPHILS # BLD AUTO: 0.03 K/UL — SIGNIFICANT CHANGE UP (ref 0–0.2)
BASOPHILS NFR BLD AUTO: 0.5 % — SIGNIFICANT CHANGE UP (ref 0–2)
BILIRUB SERPL-MCNC: 0.7 MG/DL — SIGNIFICANT CHANGE UP (ref 0.2–1.2)
BILIRUB UR-MCNC: NEGATIVE — SIGNIFICANT CHANGE UP
BUN SERPL-MCNC: 32 MG/DL — HIGH (ref 7–23)
CALCIUM SERPL-MCNC: 9.1 MG/DL — SIGNIFICANT CHANGE UP (ref 8.5–10.5)
CHLORIDE SERPL-SCNC: 100 MMOL/L — SIGNIFICANT CHANGE UP (ref 96–108)
CO2 SERPL-SCNC: 31 MMOL/L — SIGNIFICANT CHANGE UP (ref 22–31)
COLOR SPEC: SIGNIFICANT CHANGE UP
CREAT SERPL-MCNC: 1.76 MG/DL — HIGH (ref 0.5–1.3)
DIFF PNL FLD: NEGATIVE — SIGNIFICANT CHANGE UP
EGFR: 38 ML/MIN/1.73M2 — LOW
EOSINOPHIL # BLD AUTO: 0.42 K/UL — SIGNIFICANT CHANGE UP (ref 0–0.5)
EOSINOPHIL NFR BLD AUTO: 6.6 % — HIGH (ref 0–6)
EPI CELLS # UR: 1 — SIGNIFICANT CHANGE UP
FLUAV AG NPH QL: DETECTED
FLUBV AG NPH QL: SIGNIFICANT CHANGE UP
GLUCOSE SERPL-MCNC: 114 MG/DL — HIGH (ref 70–99)
GLUCOSE UR QL: NEGATIVE MG/DL — SIGNIFICANT CHANGE UP
HCT VFR BLD CALC: 39.7 % — SIGNIFICANT CHANGE UP (ref 39–50)
HGB BLD-MCNC: 13 G/DL — SIGNIFICANT CHANGE UP (ref 13–17)
IMM GRANULOCYTES NFR BLD AUTO: 0.5 % — SIGNIFICANT CHANGE UP (ref 0–0.9)
KETONES UR-MCNC: ABNORMAL MG/DL
LACTATE BLDV-MCNC: 0.7 MMOL/L — SIGNIFICANT CHANGE UP (ref 0.5–2)
LEUKOCYTE ESTERASE UR-ACNC: NEGATIVE — SIGNIFICANT CHANGE UP
LYMPHOCYTES # BLD AUTO: 1.26 K/UL — SIGNIFICANT CHANGE UP (ref 1–3.3)
LYMPHOCYTES # BLD AUTO: 19.9 % — SIGNIFICANT CHANGE UP (ref 13–44)
MCHC RBC-ENTMCNC: 32.5 PG — SIGNIFICANT CHANGE UP (ref 27–34)
MCHC RBC-ENTMCNC: 32.7 GM/DL — SIGNIFICANT CHANGE UP (ref 32–36)
MCV RBC AUTO: 99.3 FL — SIGNIFICANT CHANGE UP (ref 80–100)
MONOCYTES # BLD AUTO: 0.33 K/UL — SIGNIFICANT CHANGE UP (ref 0–0.9)
MONOCYTES NFR BLD AUTO: 5.2 % — SIGNIFICANT CHANGE UP (ref 2–14)
NEUTROPHILS # BLD AUTO: 4.27 K/UL — SIGNIFICANT CHANGE UP (ref 1.8–7.4)
NEUTROPHILS NFR BLD AUTO: 67.3 % — SIGNIFICANT CHANGE UP (ref 43–77)
NITRITE UR-MCNC: NEGATIVE — SIGNIFICANT CHANGE UP
NRBC # BLD: 0 /100 WBCS — SIGNIFICANT CHANGE UP (ref 0–0)
PH UR: 5.5 — SIGNIFICANT CHANGE UP (ref 5–8)
PLATELET # BLD AUTO: 215 K/UL — SIGNIFICANT CHANGE UP (ref 150–400)
POTASSIUM SERPL-MCNC: 4 MMOL/L — SIGNIFICANT CHANGE UP (ref 3.5–5.3)
POTASSIUM SERPL-SCNC: 4 MMOL/L — SIGNIFICANT CHANGE UP (ref 3.5–5.3)
PROT SERPL-MCNC: 6.6 G/DL — SIGNIFICANT CHANGE UP (ref 6.4–8.2)
PROT UR-MCNC: 30 MG/DL
RBC # BLD: 4 M/UL — LOW (ref 4.2–5.8)
RBC # FLD: 13.2 % — SIGNIFICANT CHANGE UP (ref 10.3–14.5)
RBC CASTS # UR COMP ASSIST: 1 /HPF — SIGNIFICANT CHANGE UP (ref 0–4)
RSV RNA NPH QL NAA+NON-PROBE: SIGNIFICANT CHANGE UP
SARS-COV-2 RNA SPEC QL NAA+PROBE: SIGNIFICANT CHANGE UP
SODIUM SERPL-SCNC: 136 MMOL/L — SIGNIFICANT CHANGE UP (ref 132–145)
SP GR SPEC: 1.09 — HIGH (ref 1–1.03)
TROPONIN I, HIGH SENSITIVITY RESULT: <4 NG/L — SIGNIFICANT CHANGE UP
UROBILINOGEN FLD QL: 1 MG/DL — SIGNIFICANT CHANGE UP (ref 0.2–1)
WBC # BLD: 6.34 K/UL — SIGNIFICANT CHANGE UP (ref 3.8–10.5)
WBC # FLD AUTO: 6.34 K/UL — SIGNIFICANT CHANGE UP (ref 3.8–10.5)
WBC UR QL: 2 /HPF — SIGNIFICANT CHANGE UP (ref 0–5)

## 2024-02-01 PROCEDURE — 99284 EMERGENCY DEPT VISIT MOD MDM: CPT

## 2024-02-01 RX ORDER — KETOROLAC TROMETHAMINE 30 MG/ML
15 SYRINGE (ML) INJECTION ONCE
Refills: 0 | Status: DISCONTINUED | OUTPATIENT
Start: 2024-02-01 | End: 2024-02-01

## 2024-02-01 RX ORDER — ONDANSETRON 8 MG/1
4 TABLET, FILM COATED ORAL ONCE
Refills: 0 | Status: COMPLETED | OUTPATIENT
Start: 2024-02-01 | End: 2024-02-01

## 2024-02-01 RX ORDER — SODIUM CHLORIDE 9 MG/ML
2000 INJECTION INTRAMUSCULAR; INTRAVENOUS; SUBCUTANEOUS ONCE
Refills: 0 | Status: COMPLETED | OUTPATIENT
Start: 2024-02-01 | End: 2024-02-01

## 2024-02-01 RX ADMIN — SODIUM CHLORIDE 2000 MILLILITER(S): 9 INJECTION INTRAMUSCULAR; INTRAVENOUS; SUBCUTANEOUS at 03:29

## 2024-02-01 RX ADMIN — Medication 15 MILLIGRAM(S): at 02:54

## 2024-02-01 RX ADMIN — ONDANSETRON 4 MILLIGRAM(S): 8 TABLET, FILM COATED ORAL at 02:54

## 2024-02-01 RX ADMIN — Medication 30 MILLIGRAM(S): at 04:06

## 2024-02-01 NOTE — ED ADULT NURSE NOTE - OBJECTIVE STATEMENT
Pt walked into ER c/o abdominal pain x1 week gradually worsening tonight. Pt reports taking a fleet enema midnight tonight due to being constipated for the past week. Pt reports x5 BM since the fleet enema. Pt denies NV, or further at triage. PMH diverticulitis and IBS-C. NKDA. Pt alert and oriented x3, ambulatory, respirations even and unlabored.

## 2024-02-01 NOTE — ED PROVIDER NOTE - NSFOLLOWUPINSTRUCTIONS_ED_ALL_ED_FT
Influenza    Influenza, more commonly known as "the flu," is a viral infection that primarily affects the respiratory tract. The respiratory tract includes organs that help you breathe, such as the lungs, nose, and throat. The flu causes many common cold symptoms, as well as a high fever and body aches.     The flu spreads easily from person to person (is contagious). Getting a flu shot (influenza vaccination) every year is the best way to prevent influenza.    CAUSES  Influenza is caused by a virus. You can catch the virus by:    Breathing in droplets from an infected person's cough or sneeze.  Touching something that was recently contaminated with the virus and then touching your mouth, nose, or eyes.    RISK FACTORS  The following factors may make you more likely to get the flu:    Not cleaning your hands frequently with soap and water or alcohol-based hand .  Having close contact with many people during cold and flu season.  Touching your mouth, eyes, or nose without washing or sanitizing your hands first.  Not drinking enough fluids or not eating a healthy diet.  Not getting enough sleep or exercise.  Being under a high amount of stress.  Not getting a yearly (annual) flu shot.    You may be at a higher risk of complications from the flu, such as a severe lung infection (pneumonia), if you:    Are over the age of 65.  Are pregnant.  Have a weakened disease-fighting system (immune system). You may have a weakened immune system if you:  Have HIV or AIDS.  Are undergoing chemotherapy.  Are taking medicines that reduce the activity of (suppress) the immune system.  Have a long-term (chronic) illness, such as heart disease, kidney disease, diabetes, or lung disease.  Have a liver disorder.  Are obese.  Have anemia.    SYMPTOMS  Symptoms of this condition typically last 4–10 days and may include:    Fever.  Chills.  Headache, body aches, or muscle aches.  Sore throat.  Cough.  Runny or congested nose.  Chest discomfort and cough.  Poor appetite.  Weakness or tiredness (fatigue).  Dizziness.  Nausea or vomiting.    DIAGNOSIS  This condition may be diagnosed based on your medical history and a physical exam. Your health care provider may do a nose or throat swab test to confirm the diagnosis.    TREATMENT  If influenza is detected early, you can be treated with antiviral medicine that can reduce the length of your illness and the severity of your symptoms. This medicine may be given by mouth (orally) or through an IV tube that is inserted in one of your veins.    The goal of treatment is to relieve symptoms by taking care of yourself at home. This may include taking over-the-counter medicines, drinking plenty of fluids, and adding humidity to the air in your home.    In some cases, influenza goes away on its own. Severe influenza or complications from influenza may be treated in a hospital.     HOME CARE INSTRUCTIONS  Take over-the-counter and prescription medicines only as told by your health care provider.  Use a cool mist humidifier to add humidity to the air in your home. This can make breathing easier.  Rest as needed.  Drink enough fluid to keep your urine clear or pale yellow.  Cover your mouth and nose when you cough or sneeze.  Wash your hands with soap and water often, especially after you cough or sneeze. If soap and water are not available, use hand .  Stay home from work or school as told by your health care provider. Unless you are visiting your health care provider, try to avoid leaving home until your fever has been gone for 24 hours without the use of medicine.  Keep all follow-up visits as told by your health care provider. This is important.    PREVENTION  Getting an annual flu shot is the best way to avoid getting the flu. You may get the flu shot in late summer, fall, or winter. Ask your health care provider when you should get your flu shot.  Wash your hands often or use hand  often.  Avoid contact with people who are sick during cold and flu season.  Eat a healthy diet, drink plenty of fluids, get enough sleep, and exercise regularly.    SEEK MEDICAL CARE IF:  You develop new symptoms.  You have:  Chest pain.  Diarrhea.  A fever.  Your cough gets worse.  You produce more mucus.  You feel nauseous or you vomit.    SEEK IMMEDIATE MEDICAL CARE IF:  You develop shortness of breath or difficulty breathing.  Your skin or nails turn a bluish color.  You have severe pain or stiffness in your neck.  You develop a sudden headache or sudden pain in your face or ear.  You cannot stop vomiting.    ADDITIONAL NOTES AND INSTRUCTIONS    Please follow up with your Primary MD in 24-48 hr.  Seek immediate medical care for any new/worsening signs or symptoms. You have been found to have influenza. Please read all handouts provided to you from the emergency department. Seek immediate medical attention for any new/worsening signs or symptoms.  Take any prescribed medications as directed. Please follow up with  your primary physician in the next 3-5 days.    To access your record on the patient portal [email or web address], please visit:  [email or web address]  If you are having difficulties setting this up, call [phone] and someone can assist you over the phone.     If you would like to see a Gastroenterologist, please call one of the offices listed below:    [facility]  [address] [place] [address]  [phone]    Medicine Specialties at [address]  [address] [place], [place] [address] [phone]     Influenza    Influenza, more commonly known as "the flu," is a viral infection that primarily affects the respiratory tract. The respiratory tract includes organs that help you breathe, such as the lungs, nose, and throat. The flu causes many common cold symptoms, as well as a high fever and body aches.     The flu spreads easily from person to person (is contagious). Getting a flu shot (influenza vaccination) every year is the best way to prevent influenza.    CAUSES  Influenza is caused by a virus. You can catch the virus by:    Breathing in droplets from an infected person's cough or sneeze.  Touching something that was recently contaminated with the virus and then touching your mouth, nose, or eyes.    RISK FACTORS  The following factors may make you more likely to get the flu:    Not cleaning your hands frequently with soap and water or alcohol-based hand .  Having close contact with many people during cold and flu season.  Touching your mouth, eyes, or nose without washing or sanitizing your hands first.  Not drinking enough fluids or not eating a healthy diet.  Not getting enough sleep or exercise.  Being under a high amount of stress.  Not getting a yearly (annual) flu shot.    You may be at a higher risk of complications from the flu, such as a severe lung infection (pneumonia), if you:    Are over the age of 65.  Are pregnant.  Have a weakened disease-fighting system (immune system). You may have a weakened immune system if you:  Have HIV or AIDS.  Are undergoing chemotherapy.  Are taking medicines that reduce the activity of (suppress) the immune system.  Have a long-term (chronic) illness, such as heart disease, kidney disease, diabetes, or lung disease.  Have a liver disorder.  Are obese.  Have anemia.    SYMPTOMS  Symptoms of this condition typically last 4–10 days and may include:    Fever.  Chills.  Headache, body aches, or muscle aches.  Sore throat.  Cough.  Runny or congested nose.  Chest discomfort and cough.  Poor appetite.  Weakness or tiredness (fatigue).  Dizziness.  Nausea or vomiting.    DIAGNOSIS  This condition may be diagnosed based on your medical history and a physical exam. Your health care provider may do a nose or throat swab test to confirm the diagnosis.    TREATMENT  If influenza is detected early, you can be treated with antiviral medicine that can reduce the length of your illness and the severity of your symptoms. This medicine may be given by mouth (orally) or through an IV tube that is inserted in one of your veins.    The goal of treatment is to relieve symptoms by taking care of yourself at home. This may include taking over-the-counter medicines, drinking plenty of fluids, and adding humidity to the air in your home.    In some cases, influenza goes away on its own. Severe influenza or complications from influenza may be treated in a hospital.     HOME CARE INSTRUCTIONS  Take over-the-counter and prescription medicines only as told by your health care provider.  Use a cool mist humidifier to add humidity to the air in your home. This can make breathing easier.  Rest as needed.  Drink enough fluid to keep your urine clear or pale yellow.  Cover your mouth and nose when you cough or sneeze.  Wash your hands with soap and water often, especially after you cough or sneeze. If soap and water are not available, use hand .  Stay home from work or school as told by your health care provider. Unless you are visiting your health care provider, try to avoid leaving home until your fever has been gone for 24 hours without the use of medicine.  Keep all follow-up visits as told by your health care provider. This is important.    PREVENTION  Getting an annual flu shot is the best way to avoid getting the flu. You may get the flu shot in late summer, fall, or winter. Ask your health care provider when you should get your flu shot.  Wash your hands often or use hand  often.  Avoid contact with people who are sick during cold and flu season.  Eat a healthy diet, drink plenty of fluids, get enough sleep, and exercise regularly.    SEEK MEDICAL CARE IF:  You develop new symptoms.  You have:  Chest pain.  Diarrhea.  A fever.  Your cough gets worse.  You produce more mucus.  You feel nauseous or you vomit.    SEEK IMMEDIATE MEDICAL CARE IF:  You develop shortness of breath or difficulty breathing.  Your skin or nails turn a bluish color.  You have severe pain or stiffness in your neck.  You develop a sudden headache or sudden pain in your face or ear.  You cannot stop vomiting.    ADDITIONAL NOTES AND INSTRUCTIONS    Please follow up with your Primary MD in 24-48 hr.  Seek immediate medical care for any new/worsening signs or symptoms.

## 2024-02-01 NOTE — ED PROVIDER NOTE - OBJECTIVE STATEMENT
83M history of IBS-C, diverticulosis, piriformis syndrome  presents with 6-7 days of L sided lower abdominal pain that has been persistent and worsened over the past day after he left AMA. Pain is rated 8/10, constant, deep in quality without radiation. Attempted to use a Fleet enema yesterday and states that the pain got worse and he had a partial BM. Did not take any medications while at home for his symptoms. States that he is eating and drinking normally. Denies fevers/chills, nausea/vomiting, chest pain, shortness of breath, cough, sore throat.

## 2024-02-01 NOTE — ED ADULT TRIAGE NOTE - CHIEF COMPLAINT QUOTE
Pt walked into ER c/o abdominal pain x1 week gradually worsening tonight. Pt reports taking a fleet enema midnight tonight due to being constipated for the past week. Pt reports x5 BM since the fleet enema. Pt denies NV, or further at triage. PMH diverticulitis and IBS-C. NKDA.

## 2024-02-01 NOTE — ED PROVIDER NOTE - PHYSICAL EXAMINATION
Patient A&Ox3, well-appearing, and in no acute distress. EOM intact and PERRL. Oropharynx with MMM. Heart rate regular, with no murmurs/gallops/clicks/rubs. Breath sounds clear to auscultation bilaterally. Abdomen ND, soft, with normal bowel sounds; mild tenderness to palpation over the left abdomen without rebound or guarding. Skin warm and dry.

## 2024-02-01 NOTE — ED PROVIDER NOTE - CLINICAL SUMMARY MEDICAL DECISION MAKING FREE TEXT BOX
83M history of IBS-C, diverticulosis, piriformis syndrome  presents with 6-7 days of L sided lower abdominal pain that has been persistent and worsened over the past day after he left AMA. Pain is rated 8/10, constant, deep in quality without radiation. Attempted to use a Fleet enema yesterday and states that the pain got worse and he had a partial BM. Did not take any medications while at home for his symptoms. States that he is eating and drinking normally. Denies fevers/chills, nausea/vomiting, chest pain, shortness of breath, cough, sore throat.         MDM: Patient presents with signs and symptoms concerning for, but not limited to, diverticulitis, colitis, muscle strain, IBS-C. Patient had an unremarkable CT and labs yesterday (<24 hr). Unlikely infectious or surgical in nature. 83M history of IBS-C, diverticulosis, piriformis syndrome  presents with 6-7 days of L sided lower abdominal pain that has been persistent and worsened over the past day after he left AMA. Pain is rated 8/10, constant, deep in quality without radiation. Attempted to use a Fleet enema yesterday and states that the pain got worse and he had a partial BM. Did not take any medications while at home for his symptoms. States that he is eating and drinking normally. Denies fevers/chills, nausea/vomiting, chest pain, shortness of breath, cough, sore throat.         MDM: Patient presents with signs and symptoms concerning for, but not limited to, diverticulitis, colitis, muscle strain, IBS-C. Patient had an unremarkable CT and labs yesterday (<24 hr). Unlikely infectious or surgical in nature and patient is refusing repeat imaging (which I am in agreement with). Has a GI, but having difficulty contacting; will put in our referral system.

## 2024-02-01 NOTE — ED PROVIDER NOTE - PATIENT PORTAL LINK FT
You can access the FollowMyHealth Patient Portal offered by [facility] by registering at the following website: [email or web address]. By joining [employer]’s FollowMyHealth portal, you will also be able to view your health information using other applications (apps) compatible with our system.

## 2024-02-01 NOTE — ED PROVIDER NOTE - NSDCPRINTRESULTS_ED_ALL_ED
Patient requests all Lab, Cardiology, and Radiology Results on their Discharge Instructions 4 = No assist / stand by assistance

## 2024-02-02 ENCOUNTER — EMERGENCY (EMERGENCY)
Facility: HOSPITAL | Age: 84
LOS: 1 days | Discharge: HOME | End: 2024-02-02
Admitting: EMERGENCY MEDICINE
Payer: MEDICARE

## 2024-02-02 VITALS
SYSTOLIC BLOOD PRESSURE: 127 MMHG | OXYGEN SATURATION: 97 % | HEIGHT: 70 IN | HEART RATE: 77 BPM | RESPIRATION RATE: 16 BRPM | DIASTOLIC BLOOD PRESSURE: 81 MMHG | TEMPERATURE: 98 F

## 2024-02-02 DIAGNOSIS — Z90.49 ACQUIRED ABSENCE OF OTHER SPECIFIED PARTS OF DIGESTIVE TRACT: Chronic | ICD-10-CM

## 2024-02-02 DIAGNOSIS — R10.9 UNSPECIFIED ABDOMINAL PAIN: ICD-10-CM

## 2024-02-02 DIAGNOSIS — Z90.89 ACQUIRED ABSENCE OF OTHER ORGANS: Chronic | ICD-10-CM

## 2024-02-02 DIAGNOSIS — Z98.890 OTHER SPECIFIED POSTPROCEDURAL STATES: Chronic | ICD-10-CM

## 2024-02-02 PROCEDURE — 99284 EMERGENCY DEPT VISIT MOD MDM: CPT

## 2024-02-02 RX ORDER — FAMOTIDINE 10 MG/ML
20 INJECTION INTRAVENOUS ONCE
Refills: 0 | Status: DISCONTINUED | OUTPATIENT
Start: 2024-02-02 | End: 2024-02-02

## 2024-02-02 RX ORDER — FAMOTIDINE 10 MG/ML
20 INJECTION INTRAVENOUS ONCE
Refills: 0 | Status: COMPLETED | OUTPATIENT
Start: 2024-02-02 | End: 2024-02-02

## 2024-02-02 RX ORDER — KETOROLAC TROMETHAMINE 30 MG/ML
15 SYRINGE (ML) INJECTION ONCE
Refills: 0 | Status: DISCONTINUED | OUTPATIENT
Start: 2024-02-02 | End: 2024-02-02

## 2024-02-02 RX ORDER — IBUPROFEN 200 MG
600 TABLET ORAL ONCE
Refills: 0 | Status: COMPLETED | OUTPATIENT
Start: 2024-02-02 | End: 2024-02-02

## 2024-02-02 RX ADMIN — FAMOTIDINE 20 MILLIGRAM(S): 10 INJECTION INTRAVENOUS at 05:20

## 2024-02-02 RX ADMIN — Medication 600 MILLIGRAM(S): at 05:20

## 2024-02-02 RX ADMIN — Medication 20 MILLIGRAM(S): at 05:20

## 2024-02-02 NOTE — ED PROVIDER NOTE - OBJECTIVE STATEMENT
84 yo m pmhx sig for IBS and recurrent abd pain with many similar ED visits with negative CTAP most recently 3 d ago, here with abd pain requesting pain control the pain similar to previous episodes radiating across the abdomen w/o any n/v, and pt is tolerating oral intake. Denies dysuria, urgency and frequency. On exam abd is soft non tender.    I have reviewed available current nursing and previous documentation of past medical, surgical, family, and/or social history.

## 2024-02-02 NOTE — ED PROVIDER NOTE - PHYSICAL EXAMINATION
Physical Exam    Vital Signs: I have reviewed the initial vital signs.  Constitutional: well-appearing, appears stated age  Eyes: PERRLA, EOM intact, RAPD absent, and symmetrical lids.  ENT: neck supple with no adenopathy, moist MM.  Cardiovascular: +S1/S2, no murmurs, regular rate, regular rhythm, well-perfused extremities  Respiratory: unlabored respiratory effort, clear to auscultation bilaterally, speaks in full sentences  Gastrointestinal: soft, non-tender abdomen, no guarding, non distended

## 2024-02-02 NOTE — ED PROVIDER NOTE - NS ED ROS FT
Review of Systems    Constitutional: (-) fever (-) weakness (-) diaphoresis   Eyes: (-) change in vision (-) phtophobia (-) eye pain  ENT: (-) sore throat (-) ear ache (-) nasal discharge  Cardiovascular: (-) chest pain  (-) palpitations  Respiratory: (-) SOB (-) cough   GI: (-) N/V (-) diarrhea  Integumentary: (-) rash (-) redness   Neurological:  (-) focal deficit (-) altered mental status

## 2024-02-02 NOTE — ED PROVIDER NOTE - CLINICAL SUMMARY MEDICAL DECISION MAKING FREE TEXT BOX
84 yo m pmhx sig for IBS and recurrent abd pain with many similar ED visits with negative CTAP most recently 3 d ago, here with abd pain requesting pain control the pain similar to previous episodes radiating across the abdomen w/o any n/v, and pt is tolerating oral intake. Denies dysuria, urgency and frequency. On exam abd is soft non tender. plan: nsaids, bentyl, maalox

## 2024-02-02 NOTE — ED PROVIDER NOTE - PROGRESS NOTE DETAILS
after receiving pain medications pt left the ed w/o informing provider or RN and did not stay for reevaluation

## 2024-02-02 NOTE — ED PROVIDER NOTE - PATIENT PORTAL LINK FT
You can access the FollowMyHealth Patient Portal offered by [facility] by registering at the following website: [email or web address]. By joining [employer]s FollowMyHealth portal, you will also be able to view your health information using other applications (apps) compatible with our system.

## 2024-02-09 ENCOUNTER — EMERGENCY (EMERGENCY)
Facility: HOSPITAL | Age: 84
LOS: 1 days | Discharge: HOME | End: 2024-02-09
Attending: EMERGENCY MEDICINE | Admitting: EMERGENCY MEDICINE
Payer: MEDICARE

## 2024-02-09 VITALS
DIASTOLIC BLOOD PRESSURE: 90 MMHG | SYSTOLIC BLOOD PRESSURE: 128 MMHG | RESPIRATION RATE: 17 BRPM | OXYGEN SATURATION: 97 % | TEMPERATURE: 98 F | HEART RATE: 61 BPM

## 2024-02-09 VITALS
SYSTOLIC BLOOD PRESSURE: 133 MMHG | RESPIRATION RATE: 16 BRPM | HEART RATE: 72 BPM | OXYGEN SATURATION: 99 % | DIASTOLIC BLOOD PRESSURE: 80 MMHG | HEIGHT: 70 IN | TEMPERATURE: 98 F

## 2024-02-09 DIAGNOSIS — Z20.822 CONTACT WITH AND (SUSPECTED) EXPOSURE TO COVID-19: ICD-10-CM

## 2024-02-09 DIAGNOSIS — Z98.890 OTHER SPECIFIED POSTPROCEDURAL STATES: Chronic | ICD-10-CM

## 2024-02-09 DIAGNOSIS — R06.02 SHORTNESS OF BREATH: ICD-10-CM

## 2024-02-09 DIAGNOSIS — F12.99 CANNABIS USE, UNSPECIFIED WITH UNSPECIFIED CANNABIS-INDUCED DISORDER: ICD-10-CM

## 2024-02-09 DIAGNOSIS — Z90.89 ACQUIRED ABSENCE OF OTHER ORGANS: Chronic | ICD-10-CM

## 2024-02-09 DIAGNOSIS — R05.9 COUGH, UNSPECIFIED: ICD-10-CM

## 2024-02-09 DIAGNOSIS — Z90.49 ACQUIRED ABSENCE OF OTHER SPECIFIED PARTS OF DIGESTIVE TRACT: Chronic | ICD-10-CM

## 2024-02-09 LAB
ALBUMIN SERPL ELPH-MCNC: 3.2 G/DL — LOW (ref 3.4–5)
ALP SERPL-CCNC: 78 U/L — SIGNIFICANT CHANGE UP (ref 40–120)
ALT FLD-CCNC: 10 U/L — LOW (ref 12–42)
ANION GAP SERPL CALC-SCNC: 6 MMOL/L — LOW (ref 9–16)
AST SERPL-CCNC: 13 U/L — LOW (ref 15–37)
BASOPHILS # BLD AUTO: 0.02 K/UL — SIGNIFICANT CHANGE UP (ref 0–0.2)
BASOPHILS NFR BLD AUTO: 0.3 % — SIGNIFICANT CHANGE UP (ref 0–2)
BILIRUB SERPL-MCNC: 0.5 MG/DL — SIGNIFICANT CHANGE UP (ref 0.2–1.2)
BUN SERPL-MCNC: 27 MG/DL — HIGH (ref 7–23)
CALCIUM SERPL-MCNC: 9 MG/DL — SIGNIFICANT CHANGE UP (ref 8.5–10.5)
CHLORIDE SERPL-SCNC: 106 MMOL/L — SIGNIFICANT CHANGE UP (ref 96–108)
CO2 SERPL-SCNC: 30 MMOL/L — SIGNIFICANT CHANGE UP (ref 22–31)
CREAT SERPL-MCNC: 1.47 MG/DL — HIGH (ref 0.5–1.3)
EGFR: 47 ML/MIN/1.73M2 — LOW
EOSINOPHIL # BLD AUTO: 0.42 K/UL — SIGNIFICANT CHANGE UP (ref 0–0.5)
EOSINOPHIL NFR BLD AUTO: 6.7 % — HIGH (ref 0–6)
FLUAV AG NPH QL: SIGNIFICANT CHANGE UP
FLUBV AG NPH QL: SIGNIFICANT CHANGE UP
GLUCOSE SERPL-MCNC: 104 MG/DL — HIGH (ref 70–99)
HCT VFR BLD CALC: 38.7 % — LOW (ref 39–50)
HGB BLD-MCNC: 12.9 G/DL — LOW (ref 13–17)
IMM GRANULOCYTES NFR BLD AUTO: 0.5 % — SIGNIFICANT CHANGE UP (ref 0–0.9)
LYMPHOCYTES # BLD AUTO: 1.77 K/UL — SIGNIFICANT CHANGE UP (ref 1–3.3)
LYMPHOCYTES # BLD AUTO: 28.3 % — SIGNIFICANT CHANGE UP (ref 13–44)
MCHC RBC-ENTMCNC: 32.7 PG — SIGNIFICANT CHANGE UP (ref 27–34)
MCHC RBC-ENTMCNC: 33.3 GM/DL — SIGNIFICANT CHANGE UP (ref 32–36)
MCV RBC AUTO: 98 FL — SIGNIFICANT CHANGE UP (ref 80–100)
MONOCYTES # BLD AUTO: 0.26 K/UL — SIGNIFICANT CHANGE UP (ref 0–0.9)
MONOCYTES NFR BLD AUTO: 4.2 % — SIGNIFICANT CHANGE UP (ref 2–14)
NEUTROPHILS # BLD AUTO: 3.76 K/UL — SIGNIFICANT CHANGE UP (ref 1.8–7.4)
NEUTROPHILS NFR BLD AUTO: 60 % — SIGNIFICANT CHANGE UP (ref 43–77)
NRBC # BLD: 0 /100 WBCS — SIGNIFICANT CHANGE UP (ref 0–0)
NT-PROBNP SERPL-SCNC: 68 PG/ML — SIGNIFICANT CHANGE UP
PLATELET # BLD AUTO: 221 K/UL — SIGNIFICANT CHANGE UP (ref 150–400)
POTASSIUM SERPL-MCNC: 4.1 MMOL/L — SIGNIFICANT CHANGE UP (ref 3.5–5.3)
POTASSIUM SERPL-SCNC: 4.1 MMOL/L — SIGNIFICANT CHANGE UP (ref 3.5–5.3)
PROT SERPL-MCNC: 6.3 G/DL — LOW (ref 6.4–8.2)
RBC # BLD: 3.95 M/UL — LOW (ref 4.2–5.8)
RBC # FLD: 13 % — SIGNIFICANT CHANGE UP (ref 10.3–14.5)
RSV RNA NPH QL NAA+NON-PROBE: SIGNIFICANT CHANGE UP
SARS-COV-2 RNA SPEC QL NAA+PROBE: SIGNIFICANT CHANGE UP
SODIUM SERPL-SCNC: 142 MMOL/L — SIGNIFICANT CHANGE UP (ref 132–145)
TROPONIN I, HIGH SENSITIVITY RESULT: <4 NG/L — SIGNIFICANT CHANGE UP
WBC # BLD: 6.26 K/UL — SIGNIFICANT CHANGE UP (ref 3.8–10.5)
WBC # FLD AUTO: 6.26 K/UL — SIGNIFICANT CHANGE UP (ref 3.8–10.5)

## 2024-02-09 PROCEDURE — 71046 X-RAY EXAM CHEST 2 VIEWS: CPT | Mod: 26

## 2024-02-09 PROCEDURE — 99285 EMERGENCY DEPT VISIT HI MDM: CPT

## 2024-02-09 NOTE — ED ADULT NURSE NOTE - AS PAIN REST
SOPHIA Basilio, CRISTIAN Sparrow./Nursing/Patient 0 (no pain/absence of nonverbal indicators of pain)

## 2024-02-09 NOTE — ED PROVIDER NOTE - PATIENT PORTAL LINK FT
You can access the FollowMyHealth Patient Portal offered by [facility] by registering at the following website: [email or web address]. By joining [employer]’s FollowMyHealth portal, you will also be able to view your health information using other applications (apps) compatible with our system.

## 2024-02-09 NOTE — ED PROVIDER NOTE - OBJECTIVE STATEMENT
83M IBS-C  presents with concern for heavy breathing that began yesterday with wet cough. Patient concerned since he was diagnosed with influenza 1-2 weeks. Smoking marijuana. Denies fevers/chills, nausea/vomiting, vision changes, headache, chest pain, abdominal pain, bowel/bladder changes.

## 2024-02-09 NOTE — ED ADULT NURSE NOTE - NSFALLUNIVINTERV_ED_ALL_ED
Bed/Stretcher in lowest position, wheels locked, appropriate side rails in place/Call bell, personal items and telephone in reach/Instruct patient to call for assistance before getting out of bed/chair/stretcher/Non-slip footwear applied when patient is off stretcher/[place] to call system/Physically safe environment - no spills, clutter or unnecessary equipment/Purposeful proactive rounding/Room/bathroom lighting operational, light cord in reach

## 2024-02-09 NOTE — ED PROVIDER NOTE - PHYSICAL EXAMINATION
PE: A&Ox3, well appearing, NAD, NCAT, regular respiratory effort ctab, rrr, abd soft ntnd, moving all four extremities equally.

## 2024-02-09 NOTE — ED ADULT TRIAGE NOTE - CHIEF COMPLAINT QUOTE
Pt BIBA c/o shortness of breath. Pt states tested flu A+ a week ago, now feeling short of breath. Afebrile. Speaking full sentences.

## 2024-02-09 NOTE — ED PROVIDER NOTE - CLINICAL SUMMARY MEDICAL DECISION MAKING FREE TEXT BOX
83M IBS-C  presents with concern for heavy breathing that began yesterday with wet cough. Patient concerned since he was diagnosed with influenza 1-2 weeks. Smoking marijuana. Denies fevers/chills, nausea/vomiting, vision changes, headache, chest pain, abdominal pain, bowel/bladder changes.     PE: A&Ox3, well appearing, NAD, NCAT, regular respiratory effort ctab, rrr, abd soft ntnd, moving all four extremities equally.     MDM: Patient with wet cough and shortness of breath in the setting of recent influenza dx concerning for, but not limited to, pneumonia, effusion, RAD, ACS. Will obtain labs, ecg, and cxr.

## 2024-02-09 NOTE — ED PROVIDER NOTE - NSFOLLOWUPINSTRUCTIONS_ED_ALL_ED_FT
Your influenza test is now negative. No signs of pneumonia on X-ray. Follow up with your primary MD.     Shortness of breath could indicate a medical problem. Causes include lung diseases, heart disease, low amount of red blood cells (anemia), poor physical fitness, being overweight, smoking, etc. Your health care provider may not be able to find a cause for your shortness of breath after your exam. In this case, it is important to have a follow-up exam with your primary care physician as instructed. If medicines were prescribed, take them as directed for the full length of time directed. Refrain from tobacco products.    SEEK IMMEDIATE MEDICAL CARE IF YOU HAVE THE FOLLOWING SYMPTOMS: worsening shortness of breath, chest pain, back pain, abdominal pain, fever, coughing up blood, lightheadedness/dizziness.

## 2024-02-16 ENCOUNTER — EMERGENCY (EMERGENCY)
Facility: HOSPITAL | Age: 84
LOS: 1 days | Discharge: HOME | End: 2024-02-16
Attending: EMERGENCY MEDICINE | Admitting: EMERGENCY MEDICINE
Payer: MEDICARE

## 2024-02-16 ENCOUNTER — EMERGENCY (EMERGENCY)
Facility: HOSPITAL | Age: 84
LOS: 1 days | Discharge: LEFT BEFORE DISCHARGE | End: 2024-02-16
Attending: EMERGENCY MEDICINE | Admitting: EMERGENCY MEDICINE
Payer: MEDICARE

## 2024-02-16 VITALS
OXYGEN SATURATION: 96 % | TEMPERATURE: 97 F | HEIGHT: 70 IN | RESPIRATION RATE: 17 BRPM | HEART RATE: 68 BPM | SYSTOLIC BLOOD PRESSURE: 121 MMHG | DIASTOLIC BLOOD PRESSURE: 80 MMHG

## 2024-02-16 VITALS
HEIGHT: 70 IN | DIASTOLIC BLOOD PRESSURE: 83 MMHG | OXYGEN SATURATION: 95 % | SYSTOLIC BLOOD PRESSURE: 124 MMHG | TEMPERATURE: 97 F | RESPIRATION RATE: 18 BRPM | HEART RATE: 69 BPM

## 2024-02-16 DIAGNOSIS — Z90.89 ACQUIRED ABSENCE OF OTHER ORGANS: Chronic | ICD-10-CM

## 2024-02-16 DIAGNOSIS — K58.9 IRRITABLE BOWEL SYNDROME WITHOUT DIARRHEA: ICD-10-CM

## 2024-02-16 DIAGNOSIS — Z90.49 ACQUIRED ABSENCE OF OTHER SPECIFIED PARTS OF DIGESTIVE TRACT: Chronic | ICD-10-CM

## 2024-02-16 DIAGNOSIS — Z98.890 OTHER SPECIFIED POSTPROCEDURAL STATES: Chronic | ICD-10-CM

## 2024-02-16 DIAGNOSIS — R10.30 LOWER ABDOMINAL PAIN, UNSPECIFIED: ICD-10-CM

## 2024-02-16 DIAGNOSIS — Z53.29 PROCEDURE AND TREATMENT NOT CARRIED OUT BECAUSE OF PATIENT'S DECISION FOR OTHER REASONS: ICD-10-CM

## 2024-02-16 DIAGNOSIS — R06.02 SHORTNESS OF BREATH: ICD-10-CM

## 2024-02-16 DIAGNOSIS — R11.0 NAUSEA: ICD-10-CM

## 2024-02-16 LAB
ALBUMIN SERPL ELPH-MCNC: 3 G/DL — LOW (ref 3.4–5)
ALP SERPL-CCNC: 67 U/L — SIGNIFICANT CHANGE UP (ref 40–120)
ALT FLD-CCNC: 15 U/L — SIGNIFICANT CHANGE UP (ref 12–42)
ANION GAP SERPL CALC-SCNC: 9 MMOL/L — SIGNIFICANT CHANGE UP (ref 9–16)
APPEARANCE UR: CLEAR — SIGNIFICANT CHANGE UP
AST SERPL-CCNC: 21 U/L — SIGNIFICANT CHANGE UP (ref 15–37)
BASOPHILS # BLD AUTO: 0.05 K/UL — SIGNIFICANT CHANGE UP (ref 0–0.2)
BASOPHILS NFR BLD AUTO: 0.8 % — SIGNIFICANT CHANGE UP (ref 0–2)
BILIRUB SERPL-MCNC: 0.6 MG/DL — SIGNIFICANT CHANGE UP (ref 0.2–1.2)
BILIRUB UR-MCNC: NEGATIVE — SIGNIFICANT CHANGE UP
BUN SERPL-MCNC: 45 MG/DL — HIGH (ref 7–23)
CALCIUM SERPL-MCNC: 8.8 MG/DL — SIGNIFICANT CHANGE UP (ref 8.5–10.5)
CHLORIDE SERPL-SCNC: 101 MMOL/L — SIGNIFICANT CHANGE UP (ref 96–108)
CO2 SERPL-SCNC: 26 MMOL/L — SIGNIFICANT CHANGE UP (ref 22–31)
COLOR SPEC: YELLOW — SIGNIFICANT CHANGE UP
CREAT SERPL-MCNC: 2.1 MG/DL — HIGH (ref 0.5–1.3)
DIFF PNL FLD: NEGATIVE — SIGNIFICANT CHANGE UP
EGFR: 30 ML/MIN/1.73M2 — LOW
EOSINOPHIL # BLD AUTO: 0.39 K/UL — SIGNIFICANT CHANGE UP (ref 0–0.5)
EOSINOPHIL NFR BLD AUTO: 6.2 % — HIGH (ref 0–6)
GLUCOSE SERPL-MCNC: 108 MG/DL — HIGH (ref 70–99)
GLUCOSE UR QL: NEGATIVE MG/DL — SIGNIFICANT CHANGE UP
HCT VFR BLD CALC: 35.7 % — LOW (ref 39–50)
HGB BLD-MCNC: 11.7 G/DL — LOW (ref 13–17)
IMM GRANULOCYTES NFR BLD AUTO: 0.5 % — SIGNIFICANT CHANGE UP (ref 0–0.9)
KETONES UR-MCNC: NEGATIVE MG/DL — SIGNIFICANT CHANGE UP
LEUKOCYTE ESTERASE UR-ACNC: NEGATIVE — SIGNIFICANT CHANGE UP
LYMPHOCYTES # BLD AUTO: 1.9 K/UL — SIGNIFICANT CHANGE UP (ref 1–3.3)
LYMPHOCYTES # BLD AUTO: 30.3 % — SIGNIFICANT CHANGE UP (ref 13–44)
MCHC RBC-ENTMCNC: 32.1 PG — SIGNIFICANT CHANGE UP (ref 27–34)
MCHC RBC-ENTMCNC: 32.8 GM/DL — SIGNIFICANT CHANGE UP (ref 32–36)
MCV RBC AUTO: 98.1 FL — SIGNIFICANT CHANGE UP (ref 80–100)
MONOCYTES # BLD AUTO: 0.36 K/UL — SIGNIFICANT CHANGE UP (ref 0–0.9)
MONOCYTES NFR BLD AUTO: 5.7 % — SIGNIFICANT CHANGE UP (ref 2–14)
NEUTROPHILS # BLD AUTO: 3.54 K/UL — SIGNIFICANT CHANGE UP (ref 1.8–7.4)
NEUTROPHILS NFR BLD AUTO: 56.5 % — SIGNIFICANT CHANGE UP (ref 43–77)
NITRITE UR-MCNC: NEGATIVE — SIGNIFICANT CHANGE UP
NRBC # BLD: 0 /100 WBCS — SIGNIFICANT CHANGE UP (ref 0–0)
PH UR: 5.5 — SIGNIFICANT CHANGE UP (ref 5–8)
PLATELET # BLD AUTO: 234 K/UL — SIGNIFICANT CHANGE UP (ref 150–400)
POTASSIUM SERPL-MCNC: 3.6 MMOL/L — SIGNIFICANT CHANGE UP (ref 3.5–5.3)
POTASSIUM SERPL-SCNC: 3.6 MMOL/L — SIGNIFICANT CHANGE UP (ref 3.5–5.3)
PROT SERPL-MCNC: 5.9 G/DL — LOW (ref 6.4–8.2)
PROT UR-MCNC: NEGATIVE MG/DL — SIGNIFICANT CHANGE UP
RBC # BLD: 3.64 M/UL — LOW (ref 4.2–5.8)
RBC # FLD: 13.1 % — SIGNIFICANT CHANGE UP (ref 10.3–14.5)
SODIUM SERPL-SCNC: 136 MMOL/L — SIGNIFICANT CHANGE UP (ref 132–145)
SP GR SPEC: 1.01 — SIGNIFICANT CHANGE UP (ref 1–1.03)
TROPONIN I, HIGH SENSITIVITY RESULT: <4 NG/L — SIGNIFICANT CHANGE UP
UROBILINOGEN FLD QL: 1 MG/DL — SIGNIFICANT CHANGE UP (ref 0.2–1)
WBC # BLD: 6.27 K/UL — SIGNIFICANT CHANGE UP (ref 3.8–10.5)
WBC # FLD AUTO: 6.27 K/UL — SIGNIFICANT CHANGE UP (ref 3.8–10.5)

## 2024-02-16 PROCEDURE — L9997: CPT

## 2024-02-16 PROCEDURE — 99285 EMERGENCY DEPT VISIT HI MDM: CPT

## 2024-02-16 PROCEDURE — 71046 X-RAY EXAM CHEST 2 VIEWS: CPT | Mod: 26

## 2024-02-16 RX ORDER — SIMETHICONE 80 MG/1
80 TABLET, CHEWABLE ORAL ONCE
Refills: 0 | Status: COMPLETED | OUTPATIENT
Start: 2024-02-16 | End: 2024-02-16

## 2024-02-16 RX ORDER — SODIUM CHLORIDE 9 MG/ML
1000 INJECTION INTRAMUSCULAR; INTRAVENOUS; SUBCUTANEOUS ONCE
Refills: 0 | Status: COMPLETED | OUTPATIENT
Start: 2024-02-16 | End: 2024-02-16

## 2024-02-16 RX ORDER — ONDANSETRON 8 MG/1
4 TABLET, FILM COATED ORAL ONCE
Refills: 0 | Status: DISCONTINUED | OUTPATIENT
Start: 2024-02-16 | End: 2024-02-19

## 2024-02-16 RX ORDER — ASPIRIN/CALCIUM CARB/MAGNESIUM 324 MG
81 TABLET ORAL DAILY
Refills: 0 | Status: DISCONTINUED | OUTPATIENT
Start: 2024-02-16 | End: 2024-02-20

## 2024-02-16 RX ADMIN — Medication 81 MILLIGRAM(S): at 21:46

## 2024-02-16 RX ADMIN — SIMETHICONE 80 MILLIGRAM(S): 80 TABLET, CHEWABLE ORAL at 11:28

## 2024-02-16 RX ADMIN — SODIUM CHLORIDE 1000 MILLILITER(S): 9 INJECTION INTRAMUSCULAR; INTRAVENOUS; SUBCUTANEOUS at 22:51

## 2024-02-16 NOTE — ED PROVIDER NOTE - CLINICAL SUMMARY MEDICAL DECISION MAKING FREE TEXT BOX
elderly male with hx recurrent ED visits for abdominal pain presents with lower abd pain without  symptoms or fluid losses or evidence of obstruction. nontender soft abdomen. will monitor without additional imaging at this time. give simethicone, screening labs and urinalysis and reassess. XR for eval superimposed bacterial pna given recent dx of flu with sob. no clinical evidence of bronchospasm and no other cp or anginal equivalents. if labs reassuring with good pain control, recommend pcp follow up.

## 2024-02-16 NOTE — ED PROVIDER NOTE - PROGRESS NOTE DETAILS
Initial BMP revealed that patient had elevated creatinine and BUN, likely in pattern of dehydration.  Patient was given a liter of fluid, labs were rechecked, creatinine and BUN are improving.  I advised the patient to continue hydrating himself and follow-up with his primary care doctor as an outpatient.  Patient understands the importance of following up, states that he would get in touch with his doctor soon as he can.  All questions answered, well-appearing upon discharge.

## 2024-02-16 NOTE — ED ADULT NURSE REASSESSMENT NOTE - NS ED NURSE REASSESS COMMENT FT1
Pt requesting oral medication and to leave, does not want blood work or IV meds. Dr. Ayala made aware. Pt requesting oral medication and to leave, does not want blood work or IV meds. Dr. Ayala made aware. Pt left department at this time.

## 2024-02-16 NOTE — ED PROVIDER NOTE - ATTENDING CONTRIBUTION TO CARE
83y male hx IBS, recently diagnosed with flu presents with lower abdominal pain since this morning, similar to his prior IBS flares. associated with nausea. no vomiting or urinary symptoms. passing stool and flatus. nonbloody BMs. no testicular pain. took tylenol and motrin this AM without relief. no fevers. intermittently short of breath, non exertional. has not informed his pcp of these symptoms.    Patient eloped from ED prior to completion of evaluation.

## 2024-02-16 NOTE — ED ADULT NURSE NOTE - OBJECTIVE STATEMENT
Pt. walk in c/o mid sternal CP that started 1 hr ago with cough and SOB. On assessment pt reporting pain in abdomen. Eloped earlier today. Pt with bed bug bites on arms, no visible bugs.

## 2024-02-16 NOTE — ED PROVIDER NOTE - CLINICAL SUMMARY MEDICAL DECISION MAKING FREE TEXT BOX
A/P: 83-year old M with PMHx of diverticulitis, IBS-C presenting to the E.R. complaining of chest pain, abdominal pain, SOB  --Differential includes but not limited to IBS, acid reflux, chronic diverticulitis, ACS/MI, PTX  --Not concerned for dissection at this time given vitals  --Plan to check labwork, CXR, treat with ASA, re-eval

## 2024-02-16 NOTE — ED PROVIDER NOTE - OBJECTIVE STATEMENT
83y male hx IBS, recently diagnosed with flu presents with lower abdominal pain since this morning, similar to his prior IBS flares. associated with nausea. no vomiting or urinary symptoms. passing stool and flatus. nonbloody BMs. no testicular pain. took tylenol and motrin this AM without relief. no fevers. intermittently short of breath, non exertional. has not informed his pcp of these symptoms.

## 2024-02-16 NOTE — ED PROVIDER NOTE - OBJECTIVE STATEMENT
83-year old male with past medical history of diverticulitis, IBS-C presented to the emergency room complaining of chest pain and abdominal pain.  Patient states that he was eating [employer] ice cream when he started feeling abdominal pain radiating up into his chest, associated with shortness of breath.  No associated nausea or vomiting.  Patient states "I feel like I am having a heart attack".  Denies any cardiac history.
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
## 2024-02-16 NOTE — ED ADULT NURSE NOTE - NSFALLUNIVINTERV_ED_ALL_ED
Bed/Stretcher in lowest position, wheels locked, appropriate side rails in place/Call bell, personal items and telephone in reach/Instruct patient to call for assistance before getting out of bed/chair/stretcher/Non-slip footwear applied when patient is off stretcher/[place] to call system/Physically safe environment - no spills, clutter or unnecessary equipment/Purposeful proactive rounding/Room/bathroom lighting operational, light cord in reach

## 2024-02-16 NOTE — ED ADULT NURSE NOTE - OBJECTIVE STATEMENT
When asked what brings pt in pt states, "same thing as always." This RN asked pt to explain further and pt then pulled out old discharge papers and read diagnoses off paperwork. When this RN again asked pt to verbalize what sx he was experiencing pt then stated nausea and bloating that began this AM. Pt not forthcoming with info. States he was taking tylenol and ibuprofen earlier in the week.

## 2024-02-16 NOTE — ED PROVIDER NOTE - PHYSICAL EXAMINATION
General: non-toxic, NAD  HEENT: NCAT, PERRL  Cardiac: RRR, no murmurs, 2+ peripheral pulses  Resp: CTAB  Abdomen: soft, non-distended, bowel sounds present, no ttp, no rebound or guarding. no organomegaly. no CVAT.   Extremities: no peripheral edema, calf tenderness, or leg size discrepancies  Skin: no rashes  Neuro: AAOx4, 5+motor, sensation grossly intact  Psych: mood and affect appropriate

## 2024-02-16 NOTE — ED PROVIDER NOTE - PATIENT PORTAL LINK FT
You can access the FollowMyHealth Patient Portal offered by [facility] by registering at the following website: [email or web address]. By joining [employer]’s FollowMyHealth portal, you will also be able to view your health information using other applications (apps) compatible with our system.

## 2024-02-17 VITALS
RESPIRATION RATE: 16 BRPM | HEART RATE: 50 BPM | SYSTOLIC BLOOD PRESSURE: 143 MMHG | DIASTOLIC BLOOD PRESSURE: 97 MMHG | OXYGEN SATURATION: 98 %

## 2024-02-17 LAB
ANION GAP SERPL CALC-SCNC: 10 MMOL/L — SIGNIFICANT CHANGE UP (ref 9–16)
BUN SERPL-MCNC: 42 MG/DL — HIGH (ref 7–23)
CALCIUM SERPL-MCNC: 8.9 MG/DL — SIGNIFICANT CHANGE UP (ref 8.5–10.5)
CHLORIDE SERPL-SCNC: 101 MMOL/L — SIGNIFICANT CHANGE UP (ref 96–108)
CO2 SERPL-SCNC: 27 MMOL/L — SIGNIFICANT CHANGE UP (ref 22–31)
CREAT SERPL-MCNC: 1.98 MG/DL — HIGH (ref 0.5–1.3)
CULTURE RESULTS: SIGNIFICANT CHANGE UP
EGFR: 33 ML/MIN/1.73M2 — LOW
GLUCOSE SERPL-MCNC: 109 MG/DL — HIGH (ref 70–99)
POTASSIUM SERPL-MCNC: 3.8 MMOL/L — SIGNIFICANT CHANGE UP (ref 3.5–5.3)
POTASSIUM SERPL-SCNC: 3.8 MMOL/L — SIGNIFICANT CHANGE UP (ref 3.5–5.3)
SODIUM SERPL-SCNC: 138 MMOL/L — SIGNIFICANT CHANGE UP (ref 132–145)
SPECIMEN SOURCE: SIGNIFICANT CHANGE UP
TROPONIN I, HIGH SENSITIVITY RESULT: <4 NG/L — SIGNIFICANT CHANGE UP

## 2024-02-19 ENCOUNTER — EMERGENCY (EMERGENCY)
Facility: HOSPITAL | Age: 84
LOS: 1 days | Discharge: HOME | End: 2024-02-19
Admitting: EMERGENCY MEDICINE
Payer: MEDICARE

## 2024-02-19 VITALS
TEMPERATURE: 97 F | RESPIRATION RATE: 16 BRPM | OXYGEN SATURATION: 98 % | DIASTOLIC BLOOD PRESSURE: 74 MMHG | HEART RATE: 71 BPM | SYSTOLIC BLOOD PRESSURE: 118 MMHG

## 2024-02-19 VITALS
HEART RATE: 65 BPM | WEIGHT: 128.09 LBS | SYSTOLIC BLOOD PRESSURE: 114 MMHG | RESPIRATION RATE: 16 BRPM | DIASTOLIC BLOOD PRESSURE: 83 MMHG | OXYGEN SATURATION: 98 % | TEMPERATURE: 98 F | HEIGHT: 70 IN

## 2024-02-19 DIAGNOSIS — Z90.89 ACQUIRED ABSENCE OF OTHER ORGANS: Chronic | ICD-10-CM

## 2024-02-19 DIAGNOSIS — Z98.890 OTHER SPECIFIED POSTPROCEDURAL STATES: Chronic | ICD-10-CM

## 2024-02-19 DIAGNOSIS — Z90.49 ACQUIRED ABSENCE OF OTHER SPECIFIED PARTS OF DIGESTIVE TRACT: Chronic | ICD-10-CM

## 2024-02-19 LAB
ALBUMIN SERPL ELPH-MCNC: 3.2 G/DL — LOW (ref 3.4–5)
ALP SERPL-CCNC: 88 U/L — SIGNIFICANT CHANGE UP (ref 40–120)
ALT FLD-CCNC: 20 U/L — SIGNIFICANT CHANGE UP (ref 12–42)
ANION GAP SERPL CALC-SCNC: 9 MMOL/L — SIGNIFICANT CHANGE UP (ref 9–16)
AST SERPL-CCNC: 19 U/L — SIGNIFICANT CHANGE UP (ref 15–37)
BASOPHILS # BLD AUTO: 0.03 K/UL — SIGNIFICANT CHANGE UP (ref 0–0.2)
BASOPHILS NFR BLD AUTO: 0.4 % — SIGNIFICANT CHANGE UP (ref 0–2)
BILIRUB SERPL-MCNC: 0.6 MG/DL — SIGNIFICANT CHANGE UP (ref 0.2–1.2)
BUN SERPL-MCNC: 26 MG/DL — HIGH (ref 7–23)
CALCIUM SERPL-MCNC: 9.1 MG/DL — SIGNIFICANT CHANGE UP (ref 8.5–10.5)
CHLORIDE SERPL-SCNC: 100 MMOL/L — SIGNIFICANT CHANGE UP (ref 96–108)
CO2 SERPL-SCNC: 25 MMOL/L — SIGNIFICANT CHANGE UP (ref 22–31)
CREAT SERPL-MCNC: 1.3 MG/DL — SIGNIFICANT CHANGE UP (ref 0.5–1.3)
EGFR: 54 ML/MIN/1.73M2 — LOW
EOSINOPHIL # BLD AUTO: 0.46 K/UL — SIGNIFICANT CHANGE UP (ref 0–0.5)
EOSINOPHIL NFR BLD AUTO: 6.7 % — HIGH (ref 0–6)
GLUCOSE SERPL-MCNC: 107 MG/DL — HIGH (ref 70–99)
HCT VFR BLD CALC: 36.9 % — LOW (ref 39–50)
HGB BLD-MCNC: 12.5 G/DL — LOW (ref 13–17)
IMM GRANULOCYTES NFR BLD AUTO: 0.4 % — SIGNIFICANT CHANGE UP (ref 0–0.9)
LYMPHOCYTES # BLD AUTO: 2.47 K/UL — SIGNIFICANT CHANGE UP (ref 1–3.3)
LYMPHOCYTES # BLD AUTO: 36 % — SIGNIFICANT CHANGE UP (ref 13–44)
MAGNESIUM SERPL-MCNC: 2.5 MG/DL — SIGNIFICANT CHANGE UP (ref 1.6–2.6)
MCHC RBC-ENTMCNC: 32.6 PG — SIGNIFICANT CHANGE UP (ref 27–34)
MCHC RBC-ENTMCNC: 33.9 GM/DL — SIGNIFICANT CHANGE UP (ref 32–36)
MCV RBC AUTO: 96.3 FL — SIGNIFICANT CHANGE UP (ref 80–100)
MONOCYTES # BLD AUTO: 0.39 K/UL — SIGNIFICANT CHANGE UP (ref 0–0.9)
MONOCYTES NFR BLD AUTO: 5.7 % — SIGNIFICANT CHANGE UP (ref 2–14)
NEUTROPHILS # BLD AUTO: 3.48 K/UL — SIGNIFICANT CHANGE UP (ref 1.8–7.4)
NEUTROPHILS NFR BLD AUTO: 50.8 % — SIGNIFICANT CHANGE UP (ref 43–77)
NRBC # BLD: 0 /100 WBCS — SIGNIFICANT CHANGE UP (ref 0–0)
PLATELET # BLD AUTO: 240 K/UL — SIGNIFICANT CHANGE UP (ref 150–400)
POTASSIUM SERPL-MCNC: 3.6 MMOL/L — SIGNIFICANT CHANGE UP (ref 3.5–5.3)
POTASSIUM SERPL-SCNC: 3.6 MMOL/L — SIGNIFICANT CHANGE UP (ref 3.5–5.3)
PROT SERPL-MCNC: 6.6 G/DL — SIGNIFICANT CHANGE UP (ref 6.4–8.2)
RBC # BLD: 3.83 M/UL — LOW (ref 4.2–5.8)
RBC # FLD: 13.2 % — SIGNIFICANT CHANGE UP (ref 10.3–14.5)
SODIUM SERPL-SCNC: 134 MMOL/L — SIGNIFICANT CHANGE UP (ref 132–145)
WBC # BLD: 6.86 K/UL — SIGNIFICANT CHANGE UP (ref 3.8–10.5)
WBC # FLD AUTO: 6.86 K/UL — SIGNIFICANT CHANGE UP (ref 3.8–10.5)

## 2024-02-19 PROCEDURE — 99284 EMERGENCY DEPT VISIT MOD MDM: CPT

## 2024-02-19 RX ORDER — FAMOTIDINE 10 MG/ML
20 INJECTION INTRAVENOUS ONCE
Refills: 0 | Status: COMPLETED | OUTPATIENT
Start: 2024-02-19 | End: 2024-02-19

## 2024-02-19 RX ORDER — KETOROLAC TROMETHAMINE 30 MG/ML
15 SYRINGE (ML) INJECTION ONCE
Refills: 0 | Status: DISCONTINUED | OUTPATIENT
Start: 2024-02-19 | End: 2024-02-19

## 2024-02-19 RX ADMIN — FAMOTIDINE 20 MILLIGRAM(S): 10 INJECTION INTRAVENOUS at 03:03

## 2024-02-19 RX ADMIN — Medication 20 MILLIGRAM(S): at 03:03

## 2024-02-19 RX ADMIN — Medication 15 MILLIGRAM(S): at 03:04

## 2024-02-19 NOTE — ED PROVIDER NOTE - PHYSICAL EXAMINATION
Gen - unkempt elderly M, NAD, comfortable and non-toxic appearing  Skin - warm, dry, intact   HEENT - AT/NC, dry mucous membrane, PERRL, no nasal discharge, airway patent, neck supple and FROM  CV - S1S2, R/R/R  Resp - CTAB, no r/r/w  GI - soft, ND, NT, no palpable pulsatile mass, no rebound or guarding, no CVAT b/l   MS - No acute or gross deformities noted to extremities. No midline spinal tenderness or step off on palpation  Neuro - AxOx3, no focal neuro deficits, ambulatory without gait disturbance

## 2024-02-19 NOTE — ED ADULT NURSE NOTE - CHIEF COMPLAINT QUOTE
male patient bibems DOUGLAS for eval of generalized malaise. patient states he has the flu; diagnosed on 02/01/24. tested negative on 02/09/24. patient also c/o feeling "gassy"

## 2024-02-19 NOTE — ED PROVIDER NOTE - OBJECTIVE STATEMENT
83-year old M with past medical history of diverticulitis, IBS-C, portal vein thrombosis, BPH, HTN, BIBA for generalized malaise and cramps. Pt is well known to our ER, last seen here 2d ago for similar cx, reports recently "got over the flu but I still feel tired and fatigue." Notes cramps across the lower abdominal region. Last BM yesterday, +flatus. Denies fever, chills, N/V/D, melena, hematochezia, hematuria, change in urinary/bowel function, dysuria, flank pain, HA, dizziness, focal weakness, CP, SOB, palpitations, cough, and rash

## 2024-02-19 NOTE — ED PROVIDER NOTE - CLINICAL SUMMARY MEDICAL DECISION MAKING FREE TEXT BOX
pt well known to our ER, seen here 2d ago with similar sx. recently dx with flu 2 wks ago, now resolved and reports persistent malaise. repeat labs with improvement in renal function and otherwise consistent with baseline. AFVSS. ambulatory, abd soft, +flatus and BM, not suspecting acute infectious or obstructive pathology, given IV meds and supportive GI cocktail with improvement. Medically stable for dc to f/u with PMD and GI. Pt verbalized understanding

## 2024-02-19 NOTE — ED ADULT NURSE NOTE - NSFALLRISKASMTTYPE_ED_ALL_ED
"Received another VM for Pt's sister, Daylin. Pt scheduled for MRI on 09/18. She is requesting sedative for Pt has he gets very anxious and gives \"everyone a lot of grief\". During treatment, she noted Pt thrashed around on the table. Noted this would be discussed with Dr Osorio and they would receive call with recommendations.     Mami Bruner LPN    "
Called and spoke with Daylin. Discussed prescription is a paper prescription and would need to be picked up here at [facility]. Daylin agreed. Paper prescription brought physically down to [facility].    Mami Bruner LPN    
Date: 8/23/2023    Time of Call: 8:13 AM     Diagnosis:  Anxiety     [ VORB ] Ordering provider: Dr Dayna Osorio  Order: 5mg Valium taken 30 minutes prior to MRI     Order received by: Mami Bruner LPN     Follow-up/additional notes:       
Received VM for Pt sister, Daylin, regarding F/U. Called and spoke with her and noted Pt needs MRI and labs with clinic F/U. They would like to complete testing at [facility] and do virtual F/U. Noted message would be sent to scheduling and they will receive a call to coordinate. Daylin verbalized understanding, agreed to current plan and denied any further questions.    Mami Bruner LPN    
Initial (On Arrival)

## 2024-02-19 NOTE — ED PROVIDER NOTE - PATIENT PORTAL LINK FT
You can access the FollowMyHealth Patient Portal offered by [facility] by registering at the following website: [email or web address]. By joining [employer]’s FollowMyHealth portal, you will also be able to view your health information using other applications (apps) compatible with our system.

## 2024-02-19 NOTE — ED ADULT TRIAGE NOTE - CHIEF COMPLAINT QUOTE
male patient bibems DOUGLAS for eval of generalized malaise. patient states he has the flu; diagnosed on 02/01/24. tested negative on 02/09/24 male patient bibems DOUGLAS for eval of generalized malaise. patient states he has the flu; diagnosed on 02/01/24. tested negative on 02/09/24. patient also c/o feeling "gassy"

## 2024-02-19 NOTE — ED PROVIDER NOTE - PENDING LAB RAD OPT OUT
BMI: BMI (kg/m2): 31.6 (11-29-21 @ 16:35)  HbA1c: A1C with Estimated Average Glucose Result: 5.4 % (11-04-21 @ 06:58)    Glucose: POCT Blood Glucose.: 78 mg/dL (12-30-21 @ 07:34)    BP: 115/58 (04-17-22 @ 09:54) (94/44 - 115/58)  Lipid Panel:  Exclude Pending Lab, Cardiology, and Radiology orders from printing on the Patient's Discharge Instructions, due to Privacy Concerns.

## 2024-02-19 NOTE — ED PROVIDER NOTE - CARE PLAN
Principal Discharge DX:	General medical exam  Secondary Diagnosis:	IBS (irritable bowel syndrome)   1

## 2024-02-19 NOTE — ED ADULT TRIAGE NOTE - CCCP TRG CHIEF CMPLNT
malaise Vaccine Information Sheet (VIS) provided-VIS date: 8/07/15/Risks/benefits discussed with patient or patient surrogate

## 2024-02-19 NOTE — ED PROVIDER NOTE - CARE PROVIDER_API CALL
your PMD,   Phone: (   )    -  Fax: (   )    -  Follow Up Time:     Brian Pennington  Gastroenterology  [address] [address]  Phone: [phone]  Fax: [phone]  Follow Up Time:

## 2024-02-20 ENCOUNTER — EMERGENCY (EMERGENCY)
Facility: HOSPITAL | Age: 84
LOS: 1 days | Discharge: HOME | End: 2024-02-20
Admitting: EMERGENCY MEDICINE
Payer: MEDICARE

## 2024-02-20 ENCOUNTER — EMERGENCY (EMERGENCY)
Facility: HOSPITAL | Age: 84
LOS: 1 days | Discharge: LEFT BEFORE DISCHARGE | End: 2024-02-20
Attending: EMERGENCY MEDICINE | Admitting: EMERGENCY MEDICINE
Payer: MEDICARE

## 2024-02-20 VITALS
SYSTOLIC BLOOD PRESSURE: 135 MMHG | OXYGEN SATURATION: 98 % | RESPIRATION RATE: 16 BRPM | TEMPERATURE: 98 F | HEART RATE: 67 BPM | DIASTOLIC BLOOD PRESSURE: 88 MMHG | HEIGHT: 70 IN

## 2024-02-20 VITALS
OXYGEN SATURATION: 94 % | HEIGHT: 70 IN | RESPIRATION RATE: 16 BRPM | DIASTOLIC BLOOD PRESSURE: 87 MMHG | HEART RATE: 62 BPM | SYSTOLIC BLOOD PRESSURE: 132 MMHG | TEMPERATURE: 98 F

## 2024-02-20 DIAGNOSIS — R00.1 BRADYCARDIA, UNSPECIFIED: ICD-10-CM

## 2024-02-20 DIAGNOSIS — G89.29 OTHER CHRONIC PAIN: ICD-10-CM

## 2024-02-20 DIAGNOSIS — R10.9 UNSPECIFIED ABDOMINAL PAIN: ICD-10-CM

## 2024-02-20 DIAGNOSIS — Z90.89 ACQUIRED ABSENCE OF OTHER ORGANS: Chronic | ICD-10-CM

## 2024-02-20 DIAGNOSIS — Z87.19 PERSONAL HISTORY OF OTHER DISEASES OF THE DIGESTIVE SYSTEM: ICD-10-CM

## 2024-02-20 DIAGNOSIS — R07.9 CHEST PAIN, UNSPECIFIED: ICD-10-CM

## 2024-02-20 DIAGNOSIS — E86.0 DEHYDRATION: ICD-10-CM

## 2024-02-20 DIAGNOSIS — Z98.890 OTHER SPECIFIED POSTPROCEDURAL STATES: Chronic | ICD-10-CM

## 2024-02-20 DIAGNOSIS — Z87.891 PERSONAL HISTORY OF NICOTINE DEPENDENCE: ICD-10-CM

## 2024-02-20 DIAGNOSIS — Z90.49 ACQUIRED ABSENCE OF OTHER SPECIFIED PARTS OF DIGESTIVE TRACT: Chronic | ICD-10-CM

## 2024-02-20 LAB
FLUAV AG NPH QL: SIGNIFICANT CHANGE UP
FLUBV AG NPH QL: SIGNIFICANT CHANGE UP
RSV RNA NPH QL NAA+NON-PROBE: SIGNIFICANT CHANGE UP
SARS-COV-2 RNA SPEC QL NAA+PROBE: SIGNIFICANT CHANGE UP

## 2024-02-20 PROCEDURE — 99284 EMERGENCY DEPT VISIT MOD MDM: CPT

## 2024-02-20 PROCEDURE — 71046 X-RAY EXAM CHEST 2 VIEWS: CPT | Mod: 26

## 2024-02-20 PROCEDURE — 74176 CT ABD & PELVIS W/O CONTRAST: CPT | Mod: 26

## 2024-02-20 RX ORDER — ACETAMINOPHEN 500 MG
650 TABLET ORAL ONCE
Refills: 0 | Status: COMPLETED | OUTPATIENT
Start: 2024-02-20 | End: 2024-02-20

## 2024-02-20 RX ORDER — IOHEXOL 300 MG/ML
30 INJECTION, SOLUTION INTRAVENOUS ONCE
Refills: 0 | Status: DISCONTINUED | OUTPATIENT
Start: 2024-02-20 | End: 2024-02-23

## 2024-02-20 RX ADMIN — Medication 650 MILLIGRAM(S): at 11:18

## 2024-02-20 NOTE — ED PROVIDER NOTE - CLINICAL SUMMARY MEDICAL DECISION MAKING FREE TEXT BOX
Cough and flu like symptoms.  Also states he has longstanding abd discomfort.    CXR ordered, shows COPD changes, Flu/covid/RSV negative.  CT abd and labs ordered.  Patient eloped from ED prior to labs/CT.

## 2024-02-20 NOTE — ED PROVIDER NOTE - CLINICAL SUMMARY MEDICAL DECISION MAKING FREE TEXT BOX
83-year-old male, seen here multiple times for similar complaints, presents this emergency room for abdominal pain  Patient was seen here earlier today, and left prior to having any blood work or imaging done  Patient was seen here yesterday for similar complaints, blood work was done which was all unremarkable, however his last CT was 1 month ago  CTAP ordered  Will continue to monitor patient

## 2024-02-20 NOTE — ED ADULT NURSE NOTE - OBJECTIVE STATEMENT
patient aox3, breathing even and unlabored on RA> appears undomiciled. c.o flu like symptoms and abd pan. patient has h/o chronic diverticulosis

## 2024-02-20 NOTE — ED PROVIDER NOTE - OBJECTIVE STATEMENT
84 yo M presents to this ED requesting blood and imaging after being seen here earlier for his chronic abd pain. States that he has IBS, And has been here multiple times for similar complaints.  Patient was here yesterday, all lab work was done which was unremarkable.  CT was done 1 month ago of the abdomen, which was unremarkable.  Denies any nausea, vomiting, diarrhea, constipation.  Denies fevers, recent travels, abdominal surgeries.

## 2024-02-20 NOTE — ED PROVIDER NOTE - PATIENT PORTAL LINK FT
You can access the FollowMyHealth Patient Portal offered by [facility] by registering at the following website: [email or web address]. By joining [facility]’s FollowMyHealth portal, you will also be able to view your health information using other applications (apps) compatible with our system.

## 2024-02-20 NOTE — ED ADULT TRIAGE NOTE - CHIEF COMPLAINT QUOTE
Pt was just seen in ED earlier today. Reports he is now agreeable to blood work and further testing that he declined earlier. C/o abdominal pain.

## 2024-02-20 NOTE — ED ADULT NURSE NOTE - HOW OFTEN DO YOU HAVE A DRINK CONTAINING ALCOHOL?
MALIKA JACOBSON discussed plan of care with RN. Will have RN D/C Xarelto as considering pulling IR drain.    Never

## 2024-02-20 NOTE — ED ADULT TRIAGE NOTE - CHIEF COMPLAINT QUOTE
Pt reports "I got the flu real bad again last night." Pt known to ED and was diagnosed with flu weeks ago. Remains with cough. Pt given mask.

## 2024-02-21 ENCOUNTER — EMERGENCY (EMERGENCY)
Facility: HOSPITAL | Age: 84
LOS: 1 days | Discharge: HOME | End: 2024-02-21
Admitting: EMERGENCY MEDICINE
Payer: MEDICARE

## 2024-02-21 VITALS
RESPIRATION RATE: 18 BRPM | OXYGEN SATURATION: 99 % | HEIGHT: 70 IN | SYSTOLIC BLOOD PRESSURE: 129 MMHG | DIASTOLIC BLOOD PRESSURE: 88 MMHG | HEART RATE: 63 BPM | TEMPERATURE: 98 F

## 2024-02-21 VITALS
OXYGEN SATURATION: 99 % | RESPIRATION RATE: 18 BRPM | HEART RATE: 70 BPM | DIASTOLIC BLOOD PRESSURE: 88 MMHG | SYSTOLIC BLOOD PRESSURE: 135 MMHG

## 2024-02-21 DIAGNOSIS — Z98.890 OTHER SPECIFIED POSTPROCEDURAL STATES: Chronic | ICD-10-CM

## 2024-02-21 DIAGNOSIS — R06.02 SHORTNESS OF BREATH: ICD-10-CM

## 2024-02-21 DIAGNOSIS — Z90.49 ACQUIRED ABSENCE OF OTHER SPECIFIED PARTS OF DIGESTIVE TRACT: Chronic | ICD-10-CM

## 2024-02-21 LAB
ALBUMIN SERPL ELPH-MCNC: 3.6 G/DL — SIGNIFICANT CHANGE UP (ref 3.4–5)
ALP SERPL-CCNC: 98 U/L — SIGNIFICANT CHANGE UP (ref 40–120)
ALT FLD-CCNC: 22 U/L — SIGNIFICANT CHANGE UP (ref 12–42)
ANION GAP SERPL CALC-SCNC: 12 MMOL/L — SIGNIFICANT CHANGE UP (ref 9–16)
APTT BLD: 32.6 SEC — SIGNIFICANT CHANGE UP (ref 24.5–35.6)
AST SERPL-CCNC: 22 U/L — SIGNIFICANT CHANGE UP (ref 15–37)
BASOPHILS # BLD AUTO: 0.03 K/UL — SIGNIFICANT CHANGE UP (ref 0–0.2)
BASOPHILS NFR BLD AUTO: 0.5 % — SIGNIFICANT CHANGE UP (ref 0–2)
BILIRUB SERPL-MCNC: 0.8 MG/DL — SIGNIFICANT CHANGE UP (ref 0.2–1.2)
BUN SERPL-MCNC: 30 MG/DL — HIGH (ref 7–23)
CALCIUM SERPL-MCNC: 9.6 MG/DL — SIGNIFICANT CHANGE UP (ref 8.5–10.5)
CHLORIDE SERPL-SCNC: 99 MMOL/L — SIGNIFICANT CHANGE UP (ref 96–108)
CO2 SERPL-SCNC: 24 MMOL/L — SIGNIFICANT CHANGE UP (ref 22–31)
CREAT SERPL-MCNC: 1.64 MG/DL — HIGH (ref 0.5–1.3)
D DIMER BLD IA.RAPID-MCNC: 198 NG/ML DDU — SIGNIFICANT CHANGE UP
EGFR: 41 ML/MIN/1.73M2 — LOW
EOSINOPHIL # BLD AUTO: 0.3 K/UL — SIGNIFICANT CHANGE UP (ref 0–0.5)
EOSINOPHIL NFR BLD AUTO: 5 % — SIGNIFICANT CHANGE UP (ref 0–6)
GLUCOSE SERPL-MCNC: 111 MG/DL — HIGH (ref 70–99)
HCT VFR BLD CALC: 40.6 % — SIGNIFICANT CHANGE UP (ref 39–50)
HGB BLD-MCNC: 13.8 G/DL — SIGNIFICANT CHANGE UP (ref 13–17)
IMM GRANULOCYTES NFR BLD AUTO: 0.3 % — SIGNIFICANT CHANGE UP (ref 0–0.9)
INR BLD: 0.95 — SIGNIFICANT CHANGE UP (ref 0.85–1.18)
LYMPHOCYTES # BLD AUTO: 1.84 K/UL — SIGNIFICANT CHANGE UP (ref 1–3.3)
LYMPHOCYTES # BLD AUTO: 30.9 % — SIGNIFICANT CHANGE UP (ref 13–44)
MCHC RBC-ENTMCNC: 32.3 PG — SIGNIFICANT CHANGE UP (ref 27–34)
MCHC RBC-ENTMCNC: 34 GM/DL — SIGNIFICANT CHANGE UP (ref 32–36)
MCV RBC AUTO: 95.1 FL — SIGNIFICANT CHANGE UP (ref 80–100)
MONOCYTES # BLD AUTO: 0.31 K/UL — SIGNIFICANT CHANGE UP (ref 0–0.9)
MONOCYTES NFR BLD AUTO: 5.2 % — SIGNIFICANT CHANGE UP (ref 2–14)
NEUTROPHILS # BLD AUTO: 3.45 K/UL — SIGNIFICANT CHANGE UP (ref 1.8–7.4)
NEUTROPHILS NFR BLD AUTO: 58.1 % — SIGNIFICANT CHANGE UP (ref 43–77)
NRBC # BLD: 0 /100 WBCS — SIGNIFICANT CHANGE UP (ref 0–0)
NT-PROBNP SERPL-SCNC: 98 PG/ML — SIGNIFICANT CHANGE UP
PCO2 BLDV: 31 MMHG — LOW (ref 42–55)
PH BLDV: 7.56 — HIGH (ref 7.32–7.43)
PLATELET # BLD AUTO: 262 K/UL — SIGNIFICANT CHANGE UP (ref 150–400)
PO2 BLDV: <35 MMHG — LOW (ref 25–45)
POTASSIUM SERPL-MCNC: 4.3 MMOL/L — SIGNIFICANT CHANGE UP (ref 3.5–5.3)
POTASSIUM SERPL-SCNC: 4.3 MMOL/L — SIGNIFICANT CHANGE UP (ref 3.5–5.3)
PROT SERPL-MCNC: 7.1 G/DL — SIGNIFICANT CHANGE UP (ref 6.4–8.2)
PROTHROM AB SERPL-ACNC: 10.8 SEC — SIGNIFICANT CHANGE UP (ref 9.5–13)
RBC # BLD: 4.27 M/UL — SIGNIFICANT CHANGE UP (ref 4.2–5.8)
RBC # FLD: 13.1 % — SIGNIFICANT CHANGE UP (ref 10.3–14.5)
SAO2 % BLDV: 12.7 % — LOW (ref 67–88)
SODIUM SERPL-SCNC: 135 MMOL/L — SIGNIFICANT CHANGE UP (ref 132–145)
TROPONIN I, HIGH SENSITIVITY RESULT: <4 NG/L — SIGNIFICANT CHANGE UP
WBC # BLD: 5.95 K/UL — SIGNIFICANT CHANGE UP (ref 3.8–10.5)
WBC # FLD AUTO: 5.95 K/UL — SIGNIFICANT CHANGE UP (ref 3.8–10.5)

## 2024-02-21 PROCEDURE — 99285 EMERGENCY DEPT VISIT HI MDM: CPT

## 2024-02-21 RX ORDER — KETOROLAC TROMETHAMINE 30 MG/ML
15 SYRINGE (ML) INJECTION ONCE
Refills: 0 | Status: DISCONTINUED | OUTPATIENT
Start: 2024-02-21 | End: 2024-02-21

## 2024-02-21 RX ORDER — HYDROXYZINE HCL 10 MG
1 TABLET ORAL
Qty: 28 | Refills: 0
Start: 2024-02-21 | End: 2024-02-27

## 2024-02-21 RX ORDER — ACETAMINOPHEN 500 MG
650 TABLET ORAL ONCE
Refills: 0 | Status: COMPLETED | OUTPATIENT
Start: 2024-02-21 | End: 2024-02-21

## 2024-02-21 RX ORDER — DIAZEPAM 5 MG
5 TABLET ORAL ONCE
Refills: 0 | Status: DISCONTINUED | OUTPATIENT
Start: 2024-02-21 | End: 2024-02-21

## 2024-02-21 RX ADMIN — Medication 15 MILLIGRAM(S): at 06:40

## 2024-02-21 RX ADMIN — Medication 5 MILLIGRAM(S): at 04:56

## 2024-02-21 RX ADMIN — Medication 15 MILLIGRAM(S): at 04:55

## 2024-02-21 RX ADMIN — Medication 650 MILLIGRAM(S): at 04:56

## 2024-02-21 RX ADMIN — Medication 650 MILLIGRAM(S): at 06:40
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
## 2024-02-21 NOTE — ED PROVIDER NOTE - PROGRESS NOTE DETAILS
all symptoms resolved after diazepam administration and pt reports that he feels better requesting diazepam to be sent, plan atarax discussed pt disheveled appearance and frequent ED visit and forgetfulness need for dementia evaluation and PM follow up, pt has PMD and will follow up with provided neurologist

## 2024-02-21 NOTE — ED PROVIDER NOTE - CARE PROVIDER_API CALL
Uvaldo Soto  Neurology  [address] [address]  Phone: [phone]  Fax: [phone]  Follow Up Time:

## 2024-02-21 NOTE — ED PROVIDER NOTE - CLINICAL SUMMARY MEDICAL DECISION MAKING FREE TEXT BOX
84 yo m well known to ed staff here with c/o sob similar visit earlier today with negative cxr and ecg, reports that symptoms began after IBS symptoms of lower abd cramping that has since resolved after Tylenol. No cp, palpitations. Reports that he feels anxious and overwhelmed not in respiratory distress. will screen for acs, pe, and ecg abnormalities, plan: cbc, cmp, trop, ddimer

## 2024-02-21 NOTE — ED PROVIDER NOTE - OBJECTIVE STATEMENT
84 yo m well known to ed staff here with c/o sob similar visit earlier today with negative cxr and ecg, reports that symptoms began after IBS symptoms of lower abd cramping that has since resolved after Tylenol. No cp, palpitations. Reports that he feels anxious and overwhelmed not in respiratory distress.    I have reviewed available current nursing and previous documentation of past medical, surgical, family, and/or social history.

## 2024-02-21 NOTE — ED ADULT TRIAGE NOTE - CHIEF COMPLAINT QUOTE
walk in pt with complaints of loss of appetite and shortness of breath. Pt appears anxious in triage and states 'I had to force myself to eat food.' VS are stable. Denies chest pain, difficulty breathing or other complaints.

## 2024-02-21 NOTE — ED PROVIDER NOTE - PATIENT PORTAL LINK FT
You can access the FollowMyHealth Patient Portal offered by [facility] by registering at the following website: [email or web address]. By joining [employer]’s FollowMyHealth portal, you will also be able to view your health information using other applications (apps) compatible with our system.

## 2024-02-21 NOTE — ED ADULT NURSE NOTE - OBJECTIVE STATEMENT
83y male presents to ED c/o sob. Pt endorses feeling anxious, having generalized abdominal pain with sob. Pt is AOx4.

## 2024-02-21 NOTE — ED ADULT TRIAGE NOTE - PATIENT ON (OXYGEN DELIVERY METHOD)
Normal vision: sees adequately in most situations; can see medication labels, newsprint/glasses room air

## 2024-02-21 NOTE — ED PROVIDER NOTE - PHYSICAL EXAMINATION
Physical Exam    Vital Signs: I have reviewed the initial vital signs.  Constitutional: well-appearing, appears stated age  Eyes: PERRLA, and symmetrical lids.  ENT: Neck supple with no adenopathy, moist MM.  Cardiovascular: regular rate, regular rhythm, well-perfused extremities  Respiratory: unlabored respiratory effort, clear to auscultation bilaterally, hyperventilation in the ed, no accessory muscle use, speaking in full sentences   Gastrointestinal: soft, non-tender abdomen, no pulsatile mass  Musculoskeletal: supple neck, no lower extremity edema  Integumentary: warm, dry, no rash  Neurologic: awake, alert, cranial nerves II-XII grossly intact, extremities’ motor and sensory functions grossly intact  Psychiatric: A&Ox3, appropriate mood, appropriate affect

## 2024-02-21 NOTE — ED PROVIDER NOTE - NS ED ROS FT
Review of Systems    Constitutional: (-) fever  Eyes/ENT: (-) change in vision, (-) sore throat, (-) ear pain  Cardiovascular: (-) chest pain, (-) palpitation   Respiratory: (-) cough  Gastrointestinal: (-) abdominal pain (-) vomiting, (-) diarrhea  Musculoskeletal: (-) neck pain, (-) back pain, (-) joint pain  Integumentary: (-) rash, (-) edema  Neurological: (-) headache, (-) altered mental status  Heme/Lymph: (-) easy bruising (-) easy bleeding (-) lymphadenopathy  Allergic/Immunologic: (-) pruritus

## 2024-02-22 DIAGNOSIS — I10 ESSENTIAL (PRIMARY) HYPERTENSION: ICD-10-CM

## 2024-02-22 DIAGNOSIS — K58.9 IRRITABLE BOWEL SYNDROME WITHOUT DIARRHEA: ICD-10-CM

## 2024-02-22 DIAGNOSIS — Z86.718 PERSONAL HISTORY OF OTHER VENOUS THROMBOSIS AND EMBOLISM: ICD-10-CM

## 2024-02-22 DIAGNOSIS — Z87.19 PERSONAL HISTORY OF OTHER DISEASES OF THE DIGESTIVE SYSTEM: ICD-10-CM

## 2024-02-22 DIAGNOSIS — F17.200 NICOTINE DEPENDENCE, UNSPECIFIED, UNCOMPLICATED: ICD-10-CM

## 2024-02-22 DIAGNOSIS — R53.81 OTHER MALAISE: ICD-10-CM

## 2024-02-23 ENCOUNTER — EMERGENCY (EMERGENCY)
Facility: HOSPITAL | Age: 84
LOS: 1 days | Discharge: HOME | End: 2024-02-23
Attending: EMERGENCY MEDICINE | Admitting: EMERGENCY MEDICINE
Payer: MEDICARE

## 2024-02-23 VITALS
DIASTOLIC BLOOD PRESSURE: 85 MMHG | HEART RATE: 69 BPM | WEIGHT: 130.07 LBS | HEIGHT: 70 IN | OXYGEN SATURATION: 98 % | SYSTOLIC BLOOD PRESSURE: 126 MMHG | RESPIRATION RATE: 17 BRPM | TEMPERATURE: 97 F

## 2024-02-23 VITALS
DIASTOLIC BLOOD PRESSURE: 80 MMHG | TEMPERATURE: 97 F | OXYGEN SATURATION: 98 % | HEART RATE: 74 BPM | RESPIRATION RATE: 17 BRPM | SYSTOLIC BLOOD PRESSURE: 128 MMHG

## 2024-02-23 DIAGNOSIS — R10.9 UNSPECIFIED ABDOMINAL PAIN: ICD-10-CM

## 2024-02-23 DIAGNOSIS — Z53.21 PROCEDURE AND TREATMENT NOT CARRIED OUT DUE TO PATIENT LEAVING PRIOR TO BEING SEEN BY HEALTH CARE PROVIDER: ICD-10-CM

## 2024-02-23 DIAGNOSIS — Z90.49 ACQUIRED ABSENCE OF OTHER SPECIFIED PARTS OF DIGESTIVE TRACT: Chronic | ICD-10-CM

## 2024-02-23 DIAGNOSIS — R05.9 COUGH, UNSPECIFIED: ICD-10-CM

## 2024-02-23 DIAGNOSIS — Z90.89 ACQUIRED ABSENCE OF OTHER ORGANS: Chronic | ICD-10-CM

## 2024-02-23 DIAGNOSIS — Z87.891 PERSONAL HISTORY OF NICOTINE DEPENDENCE: ICD-10-CM

## 2024-02-23 DIAGNOSIS — Z98.890 OTHER SPECIFIED POSTPROCEDURAL STATES: Chronic | ICD-10-CM

## 2024-02-23 DIAGNOSIS — Z20.822 CONTACT WITH AND (SUSPECTED) EXPOSURE TO COVID-19: ICD-10-CM

## 2024-02-23 LAB
ALBUMIN SERPL ELPH-MCNC: 3.3 G/DL — LOW (ref 3.4–5)
ALP SERPL-CCNC: 83 U/L — SIGNIFICANT CHANGE UP (ref 40–120)
ALT FLD-CCNC: 16 U/L — SIGNIFICANT CHANGE UP (ref 12–42)
ANION GAP SERPL CALC-SCNC: 10 MMOL/L — SIGNIFICANT CHANGE UP (ref 9–16)
APPEARANCE UR: CLEAR — SIGNIFICANT CHANGE UP
AST SERPL-CCNC: 21 U/L — SIGNIFICANT CHANGE UP (ref 15–37)
BASOPHILS # BLD AUTO: 0.05 K/UL — SIGNIFICANT CHANGE UP (ref 0–0.2)
BASOPHILS NFR BLD AUTO: 0.9 % — SIGNIFICANT CHANGE UP (ref 0–2)
BILIRUB SERPL-MCNC: 0.6 MG/DL — SIGNIFICANT CHANGE UP (ref 0.2–1.2)
BILIRUB UR-MCNC: NEGATIVE — SIGNIFICANT CHANGE UP
BUN SERPL-MCNC: 27 MG/DL — HIGH (ref 7–23)
CALCIUM SERPL-MCNC: 9.3 MG/DL — SIGNIFICANT CHANGE UP (ref 8.5–10.5)
CHLORIDE SERPL-SCNC: 103 MMOL/L — SIGNIFICANT CHANGE UP (ref 96–108)
CO2 SERPL-SCNC: 26 MMOL/L — SIGNIFICANT CHANGE UP (ref 22–31)
COLOR SPEC: YELLOW — SIGNIFICANT CHANGE UP
CREAT SERPL-MCNC: 1.29 MG/DL — SIGNIFICANT CHANGE UP (ref 0.5–1.3)
DIFF PNL FLD: NEGATIVE — SIGNIFICANT CHANGE UP
EGFR: 55 ML/MIN/1.73M2 — LOW
EOSINOPHIL # BLD AUTO: 0.46 K/UL — SIGNIFICANT CHANGE UP (ref 0–0.5)
EOSINOPHIL NFR BLD AUTO: 8.7 % — HIGH (ref 0–6)
FLUAV AG NPH QL: SIGNIFICANT CHANGE UP
FLUBV AG NPH QL: SIGNIFICANT CHANGE UP
GLUCOSE SERPL-MCNC: 100 MG/DL — HIGH (ref 70–99)
GLUCOSE UR QL: NEGATIVE MG/DL — SIGNIFICANT CHANGE UP
HCT VFR BLD CALC: 39.4 % — SIGNIFICANT CHANGE UP (ref 39–50)
HGB BLD-MCNC: 13.4 G/DL — SIGNIFICANT CHANGE UP (ref 13–17)
IMM GRANULOCYTES NFR BLD AUTO: 0.4 % — SIGNIFICANT CHANGE UP (ref 0–0.9)
KETONES UR-MCNC: ABNORMAL MG/DL
LACTATE BLDV-MCNC: 1.3 MMOL/L — SIGNIFICANT CHANGE UP (ref 0.5–2)
LEUKOCYTE ESTERASE UR-ACNC: NEGATIVE — SIGNIFICANT CHANGE UP
LIDOCAIN IGE QN: 32 U/L — SIGNIFICANT CHANGE UP (ref 16–77)
LYMPHOCYTES # BLD AUTO: 1.7 K/UL — SIGNIFICANT CHANGE UP (ref 1–3.3)
LYMPHOCYTES # BLD AUTO: 32.2 % — SIGNIFICANT CHANGE UP (ref 13–44)
MAGNESIUM SERPL-MCNC: 2.4 MG/DL — SIGNIFICANT CHANGE UP (ref 1.6–2.6)
MCHC RBC-ENTMCNC: 32.9 PG — SIGNIFICANT CHANGE UP (ref 27–34)
MCHC RBC-ENTMCNC: 34 GM/DL — SIGNIFICANT CHANGE UP (ref 32–36)
MCV RBC AUTO: 96.8 FL — SIGNIFICANT CHANGE UP (ref 80–100)
MONOCYTES # BLD AUTO: 0.24 K/UL — SIGNIFICANT CHANGE UP (ref 0–0.9)
MONOCYTES NFR BLD AUTO: 4.5 % — SIGNIFICANT CHANGE UP (ref 2–14)
NEUTROPHILS # BLD AUTO: 2.81 K/UL — SIGNIFICANT CHANGE UP (ref 1.8–7.4)
NEUTROPHILS NFR BLD AUTO: 53.3 % — SIGNIFICANT CHANGE UP (ref 43–77)
NITRITE UR-MCNC: NEGATIVE — SIGNIFICANT CHANGE UP
NRBC # BLD: 0 /100 WBCS — SIGNIFICANT CHANGE UP (ref 0–0)
PCO2 BLDV: 32 MMHG — LOW (ref 42–55)
PH BLDV: 7.51 — HIGH (ref 7.32–7.43)
PH UR: 6 — SIGNIFICANT CHANGE UP (ref 5–8)
PLATELET # BLD AUTO: 232 K/UL — SIGNIFICANT CHANGE UP (ref 150–400)
PO2 BLDV: <35 MMHG — SIGNIFICANT CHANGE UP (ref 25–45)
POTASSIUM SERPL-MCNC: 4.5 MMOL/L — SIGNIFICANT CHANGE UP (ref 3.5–5.3)
POTASSIUM SERPL-SCNC: 4.5 MMOL/L — SIGNIFICANT CHANGE UP (ref 3.5–5.3)
PROT SERPL-MCNC: 6.7 G/DL — SIGNIFICANT CHANGE UP (ref 6.4–8.2)
PROT UR-MCNC: NEGATIVE MG/DL — SIGNIFICANT CHANGE UP
RBC # BLD: 4.07 M/UL — LOW (ref 4.2–5.8)
RBC # FLD: 13.3 % — SIGNIFICANT CHANGE UP (ref 10.3–14.5)
RSV RNA NPH QL NAA+NON-PROBE: SIGNIFICANT CHANGE UP
SAO2 % BLDV: 52.5 % — LOW (ref 67–88)
SARS-COV-2 RNA SPEC QL NAA+PROBE: SIGNIFICANT CHANGE UP
SODIUM SERPL-SCNC: 139 MMOL/L — SIGNIFICANT CHANGE UP (ref 132–145)
SP GR SPEC: 1.02 — SIGNIFICANT CHANGE UP (ref 1–1.03)
UROBILINOGEN FLD QL: 1 MG/DL — SIGNIFICANT CHANGE UP (ref 0.2–1)
WBC # BLD: 5.28 K/UL — SIGNIFICANT CHANGE UP (ref 3.8–10.5)
WBC # FLD AUTO: 5.28 K/UL — SIGNIFICANT CHANGE UP (ref 3.8–10.5)

## 2024-02-23 PROCEDURE — 99284 EMERGENCY DEPT VISIT MOD MDM: CPT

## 2024-02-23 PROCEDURE — 71045 X-RAY EXAM CHEST 1 VIEW: CPT | Mod: 26

## 2024-02-23 RX ORDER — DIAZEPAM 5 MG
5 TABLET ORAL ONCE
Refills: 0 | Status: DISCONTINUED | OUTPATIENT
Start: 2024-02-23 | End: 2024-02-23

## 2024-02-23 RX ORDER — ACETAMINOPHEN 500 MG
975 TABLET ORAL ONCE
Refills: 0 | Status: COMPLETED | OUTPATIENT
Start: 2024-02-23 | End: 2024-02-23

## 2024-02-23 RX ADMIN — Medication 975 MILLIGRAM(S): at 06:49

## 2024-02-23 RX ADMIN — Medication 5 MILLIGRAM(S): at 06:49

## 2024-02-23 NOTE — ED PROVIDER NOTE - PROGRESS NOTE DETAILS
patient requesting dc, feels much better, has appt with dr astudillo, gi this wed. will dc, given strict return precautions.

## 2024-02-23 NOTE — ED ADULT NURSE NOTE - OBJECTIVE STATEMENT
pt reports persistent abdominal pain, nausea, constipation; was previously evaluated here yesterday for same symptoms, pt states he was given Valium which helped a lot; pt self-administered enema yesterday without much output/relief; hx diverticulosis, hernia, appendectomy, IBS; pt alert & oriented x4, in no acute distress

## 2024-02-23 NOTE — ED ADULT NURSE NOTE - DISCHARGE DATE/TIME
Education/demonstration provided to patient on home sleep study.    Watch[employer] equipment given to patient and equipment agreement signed.   
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 Please refer to sleep disorder clinic . Study needs to be repeated to validate current borderline GIRISH diagnosis CLINICAL HISTORY: 27 year old male presented with: 17.5 inch neck, BMI of 44.6, an [place] sleepiness score of 7, history of hypertension, a previous diagnosis of GIRISH and symptoms of nocturnal snoring and waking up choking. Based on the clinical history, the patient has a high pre-test probability of having Severe GIRISH.
23-Feb-2024 07:57

## 2024-02-23 NOTE — ED ADULT TRIAGE NOTE - CHIEF COMPLAINT QUOTE
pt. c/o abdominal pain and poor appetite, endorses he gave himself an enema yesterday and no improvement.

## 2024-02-23 NOTE — ED PROVIDER NOTE - OBJECTIVE STATEMENT
82 yo M, hx HTN, on enalapril, BPH, finasteride, tamulosin, IBS- C, on tylenol for pain, presents with multiple medical complaints. patient notes hx of IBS, with hx of chronic abdominal pain, presents with abdominal discomfort, diffuse, associated with sob, increasing anxiety and unable to sleep this evening. denies N/V/D, denies fever, chills. denies cp, syncope or palpitations. Patient last meal was dinner last evening.  patient is a nonsmoker, denies etoh or drugs. covid vaccinated. Hx of L hernia repair 2006 inguinal, hx of appendectomy. PCP - Arabella Dominguez. 84 yo M, hx HTN, on enalapril, BPH, finasteride, tamulosin, IBS- C, on tylenol for pain, presents with multiple medical complaints. Patient is well-known to the department and recently presented 2 days ago with similar complaints. patient notes hx of IBS, with hx of chronic abdominal pain, presents with abdominal discomfort, diffuse, associated with sob, increasing anxiety and unable to sleep this evening. denies N/V/D, denies fever, chills. denies cp, syncope or palpitations. Patient last meal was dinner last evening.  patient is a nonsmoker, denies etoh or drugs. covid vaccinated. Hx of L hernia repair 2006 inguinal, hx of appendectomy. PCP - Arabella Dominguez.

## 2024-02-23 NOTE — ED PROVIDER NOTE - PATIENT PORTAL LINK FT
You can access the FollowMyHealth Patient Portal offered by [facility] by registering at the following website: [email or web address]. By joining [employer]’s FollowMyHealth portal, you will also be able to view your health information using other applications (apps) compatible with our system.

## 2024-02-23 NOTE — ED PROVIDER NOTE - CLINICAL SUMMARY MEDICAL DECISION MAKING FREE TEXT BOX
82 yo M, hx HTN, on enalapril, BPH, finasteride, tamulosin, IBS- C, on tylenol for pain, presents with multiple medical complaints. Patient is well-known to the department and recently presented 2 days ago with similar complaints. patient notes hx of IBS, with hx of chronic abdominal pain, presents with abdominal discomfort, diffuse, associated with sob, increasing anxiety and unable to sleep this evening. denies N/V/D, denies fever, chills. denies cp, syncope or palpitations. Patient last meal was dinner last evening.  patient is a nonsmoker, denies etoh or drugs. covid vaccinated. Hx of L hernia repair 2006 inguinal, hx of appendectomy. PCP - Arabella Dominguez.      Plan is to attain basic electrolytes, urine sample, chest x-ray rule out free air although I have low suspicion.  Patient is nontoxic-appearing his complaints appear chronic.  Will also do a viral swab.  Patient is requesting Valium for treatment of his symptoms as he notes that 2 days ago he was seen and this helped him significantly.  He has a GI appointment coming up in march

## 2024-02-23 NOTE — ED PROVIDER NOTE - CARE PROVIDER_API CALL
Brian Pennington  Gastroenterology  [address] [address]  Phone: [phone]  Fax: [phone]  Follow Up Time:

## 2024-02-23 NOTE — ED PROVIDER NOTE - NSFOLLOWUPINSTRUCTIONS_ED_ALL_ED_FT
Abdominal Pain    Many things can cause abdominal pain. Many times, abdominal pain is not caused by a disease and will improve without treatment. Your health care provider will do a physical exam to determine if there is a dangerous cause of your pain; blood tests and imaging may help determine the cause of your pain. However, in many cases, no cause may be found and you may need further testing as an outpatient. Monitor your abdominal pain for any changes.     SEEK IMMEDIATE MEDICAL CARE IF YOU HAVE ANY OF THE FOLLOWING SYMPTOMS: worsening abdominal pain, uncontrollable vomiting, profuse diarrhea, inability to have bowel movements or pass gas, black or bloody stools, fever accompanying chest pain or back pain, or fainting. These symptoms may represent a serious problem that is an emergency. Do not wait to see if the symptoms will go away. Get medical help right away. Call 911 and do not drive yourself to the hospital.     Chronic Pain    Chronic pain is a complex condition and the Emergency Department is not the ideal place to manage this condition. Prescription painkillers must be written by your primary care provider or a pain management physician. Avoid activities or triggers that exacerbate your pain.    SEEK IMMEDIATE MEDICAL CARE IF YOU HAVE ANY OF THE FOLLOWING SYMPTOMS: severe chest pain, fainting, vomiting blood, dark or bloody stools, or pain different from your chronic pain.

## 2024-02-24 ENCOUNTER — EMERGENCY (EMERGENCY)
Facility: HOSPITAL | Age: 84
LOS: 1 days | Discharge: HOME | End: 2024-02-24
Admitting: EMERGENCY MEDICINE
Payer: MEDICARE

## 2024-02-24 VITALS
TEMPERATURE: 98 F | HEART RATE: 76 BPM | DIASTOLIC BLOOD PRESSURE: 72 MMHG | OXYGEN SATURATION: 98 % | HEIGHT: 70 IN | SYSTOLIC BLOOD PRESSURE: 106 MMHG | RESPIRATION RATE: 16 BRPM

## 2024-02-24 VITALS
HEART RATE: 56 BPM | OXYGEN SATURATION: 98 % | DIASTOLIC BLOOD PRESSURE: 73 MMHG | SYSTOLIC BLOOD PRESSURE: 110 MMHG | TEMPERATURE: 98 F | RESPIRATION RATE: 16 BRPM

## 2024-02-24 DIAGNOSIS — Z98.890 OTHER SPECIFIED POSTPROCEDURAL STATES: Chronic | ICD-10-CM

## 2024-02-24 DIAGNOSIS — Z90.49 ACQUIRED ABSENCE OF OTHER SPECIFIED PARTS OF DIGESTIVE TRACT: Chronic | ICD-10-CM

## 2024-02-24 DIAGNOSIS — Z90.89 ACQUIRED ABSENCE OF OTHER ORGANS: Chronic | ICD-10-CM

## 2024-02-24 PROCEDURE — 99284 EMERGENCY DEPT VISIT MOD MDM: CPT

## 2024-02-24 RX ORDER — ONDANSETRON 8 MG/1
4 TABLET, FILM COATED ORAL ONCE
Refills: 0 | Status: COMPLETED | OUTPATIENT
Start: 2024-02-24 | End: 2024-02-24

## 2024-02-24 RX ORDER — FAMOTIDINE 10 MG/ML
20 INJECTION INTRAVENOUS ONCE
Refills: 0 | Status: COMPLETED | OUTPATIENT
Start: 2024-02-24 | End: 2024-02-24

## 2024-02-24 RX ORDER — ACETAMINOPHEN 500 MG
975 TABLET ORAL ONCE
Refills: 0 | Status: COMPLETED | OUTPATIENT
Start: 2024-02-24 | End: 2024-02-24

## 2024-02-24 RX ORDER — DIAZEPAM 5 MG
5 TABLET ORAL ONCE
Refills: 0 | Status: DISCONTINUED | OUTPATIENT
Start: 2024-02-24 | End: 2024-02-24

## 2024-02-24 RX ADMIN — Medication 20 MILLIGRAM(S): at 09:20

## 2024-02-24 RX ADMIN — FAMOTIDINE 20 MILLIGRAM(S): 10 INJECTION INTRAVENOUS at 09:20

## 2024-02-24 RX ADMIN — ONDANSETRON 4 MILLIGRAM(S): 8 TABLET, FILM COATED ORAL at 09:19

## 2024-02-24 RX ADMIN — Medication 5 MILLIGRAM(S): at 09:19

## 2024-02-24 RX ADMIN — Medication 975 MILLIGRAM(S): at 09:19

## 2024-02-24 NOTE — ED PROVIDER NOTE - NSFOLLOWUPINSTRUCTIONS_ED_ALL_ED_FT

## 2024-02-24 NOTE — ED PROVIDER NOTE - NS ED ROS FT
Constitutional:  no fever, no chills  Eyes:  no discharge, no irritations, no pain, no redness, visual changes  Ears:  no ear pain, no ear drainage,  no hearing problems  Nose:  no nasal congestion, no nasal drainage  Mouth/Throat:  no hoarseness and no throat pain  Neck:  no stiffness, no pain, no lumps, no swollen glands  Cardiac:  no chest pain, no edema  Respiratory:  no cough, no shortness of breath  GI: + abdominal pain, no bloating, no constipation, + diarrhea, + nausea, no vomiting, + abd bloating  :  no dysuria, no urinary frequency, no hematuria, no discharge  MSK:  no back pain, no msk pain, no weakness  NEURO:  no headache, no weakness, no numbness  Skin:  no lesions, no pruritis, no jaundice, no bruising, no rash  Psych:  no known mental health issues  Endocrine:  no diabetes, no thyroid issues

## 2024-02-24 NOTE — ED PROVIDER NOTE - PHYSICAL EXAMINATION
Constitutional:  Disheveled, unkept, soiled clothing, thin, awake, alert, oriented to person, place, time/situation and in no apparent distress  Head:  NC/AT, symmetric, neck supple  ENMT: Airway patent, nasal mucosa clear, mouth with normal mucosa, throat has no vesicles, no oropharyngeal exudates and uvula is midline  Eyes:  Clear bilaterally, pupils equal, round and reactive to light  Cardiac:  Normal rate, regular rhythm. Heart sounds S1,S2.  No murmurs, rubs or gallops.  No JVD.  Respiratory:  Breath sounds clear and equal bilaterally  GI:   Abd soft, non-distended, + lower abd ttp, no rebound or guarding  MSK:  Spine appears normal, range of motion is not limited, no muscle or joint tenderness  Neuro:  Alert and oriented, no focal deficits, no motor or sensory deficits  Skin:  Skin normal color for race, warm, dry and intact.  No evidence of rash  Psych:  Normal mood and affect, no apparent risk to self or others.  Heme:  No adenopathy or splenomegaly.

## 2024-02-24 NOTE — ED PROVIDER NOTE - OBJECTIVE STATEMENT
83-year-old male, past medical history of diverticulosis, hypertension, IBD, IBS, BPH, stage III kidney disease presents to ED emergency department with lower abdominal pain/cramping with associated nausea and diarrhea.  Patient seen in this emergency department for the same complaint yesterday.  Patient has frequent visits to this emergency department for similar complaints.  Patient states Valium typically helps his symptoms and "calms things down".  Patient's last CAT scan was unremarkable as of February 20, 2024.  Patient states his pain is no different than prior and his home medications are not helping.  Patient has a follow-up with a new GI in 3 weeks.

## 2024-02-24 NOTE — ED PROVIDER NOTE - PATIENT PORTAL LINK FT
You can access the FollowMyHealth Patient Portal offered by [facility] by registering at the following website: [email or web address]. By joining [employer]’s FollowMyHealth portal, you will also be able to view your health information using other applications (apps) compatible with our system.

## 2024-02-24 NOTE — ED PROVIDER NOTE - CLINICAL SUMMARY MEDICAL DECISION MAKING FREE TEXT BOX
83-year-old male presents emergency department complaining of acute on chronic abdominal pain consistent with prior episodes secondary to IBS.  Patient well-appearing, vital signs stable, no acute distress.  Abdominal exam is consistent with prior visits.  No concerns for surgical or infectious pathology at this time.  Patient noted to have history of requesting to leave the emergency department prior to results.  Patient states "I was just seen yesterday, why do I need to wait for results".  Patient is seeking symptomatic relief.  He prefers Valium.  Patient treated with oral medications: Tylenol, Bentyl, Pepcid, Zofran and Valium.  Patient advised to follow-up with GI as scheduled.  Return precautions advised.

## 2024-02-26 ENCOUNTER — EMERGENCY (EMERGENCY)
Facility: HOSPITAL | Age: 84
LOS: 1 days | Discharge: HOME | End: 2024-02-26
Attending: EMERGENCY MEDICINE | Admitting: EMERGENCY MEDICINE
Payer: MEDICARE

## 2024-02-26 VITALS
TEMPERATURE: 98 F | HEART RATE: 62 BPM | SYSTOLIC BLOOD PRESSURE: 120 MMHG | DIASTOLIC BLOOD PRESSURE: 77 MMHG | RESPIRATION RATE: 16 BRPM | HEIGHT: 70 IN | OXYGEN SATURATION: 96 %

## 2024-02-26 DIAGNOSIS — R06.02 SHORTNESS OF BREATH: ICD-10-CM

## 2024-02-26 DIAGNOSIS — Z87.19 PERSONAL HISTORY OF OTHER DISEASES OF THE DIGESTIVE SYSTEM: ICD-10-CM

## 2024-02-26 DIAGNOSIS — R10.9 UNSPECIFIED ABDOMINAL PAIN: ICD-10-CM

## 2024-02-26 DIAGNOSIS — F41.9 ANXIETY DISORDER, UNSPECIFIED: ICD-10-CM

## 2024-02-26 DIAGNOSIS — G89.29 OTHER CHRONIC PAIN: ICD-10-CM

## 2024-02-26 DIAGNOSIS — Z90.89 ACQUIRED ABSENCE OF OTHER ORGANS: Chronic | ICD-10-CM

## 2024-02-26 DIAGNOSIS — Z90.49 ACQUIRED ABSENCE OF OTHER SPECIFIED PARTS OF DIGESTIVE TRACT: Chronic | ICD-10-CM

## 2024-02-26 DIAGNOSIS — I10 ESSENTIAL (PRIMARY) HYPERTENSION: ICD-10-CM

## 2024-02-26 DIAGNOSIS — K58.1 IRRITABLE BOWEL SYNDROME WITH CONSTIPATION: ICD-10-CM

## 2024-02-26 DIAGNOSIS — N40.0 BENIGN PROSTATIC HYPERPLASIA WITHOUT LOWER URINARY TRACT SYMPTOMS: ICD-10-CM

## 2024-02-26 DIAGNOSIS — Z20.822 CONTACT WITH AND (SUSPECTED) EXPOSURE TO COVID-19: ICD-10-CM

## 2024-02-26 DIAGNOSIS — Z90.49 ACQUIRED ABSENCE OF OTHER SPECIFIED PARTS OF DIGESTIVE TRACT: ICD-10-CM

## 2024-02-26 DIAGNOSIS — Z98.890 OTHER SPECIFIED POSTPROCEDURAL STATES: Chronic | ICD-10-CM

## 2024-02-26 LAB
-  AMPICILLIN: SIGNIFICANT CHANGE UP
-  CIPROFLOXACIN: SIGNIFICANT CHANGE UP
-  LEVOFLOXACIN: SIGNIFICANT CHANGE UP
-  NITROFURANTOIN: SIGNIFICANT CHANGE UP
-  TETRACYCLINE: SIGNIFICANT CHANGE UP
-  VANCOMYCIN: SIGNIFICANT CHANGE UP
CULTURE RESULTS: ABNORMAL
METHOD TYPE: SIGNIFICANT CHANGE UP
ORGANISM # SPEC MICROSCOPIC CNT: ABNORMAL
ORGANISM # SPEC MICROSCOPIC CNT: SIGNIFICANT CHANGE UP
SPECIMEN SOURCE: SIGNIFICANT CHANGE UP

## 2024-02-26 PROCEDURE — 99284 EMERGENCY DEPT VISIT MOD MDM: CPT

## 2024-02-26 RX ORDER — LACTULOSE 10 G/15ML
10 SOLUTION ORAL ONCE
Refills: 0 | Status: COMPLETED | OUTPATIENT
Start: 2024-02-26 | End: 2024-02-26

## 2024-02-26 RX ORDER — POLYETHYLENE GLYCOL 3350 17 G/17G
17 POWDER, FOR SOLUTION ORAL ONCE
Refills: 0 | Status: COMPLETED | OUTPATIENT
Start: 2024-02-26 | End: 2024-02-26

## 2024-02-26 RX ORDER — ONDANSETRON 8 MG/1
4 TABLET, FILM COATED ORAL ONCE
Refills: 0 | Status: COMPLETED | OUTPATIENT
Start: 2024-02-26 | End: 2024-02-26

## 2024-02-26 RX ORDER — MINERAL OIL
30 OIL (ML) MISCELLANEOUS ONCE
Refills: 0 | Status: COMPLETED | OUTPATIENT
Start: 2024-02-26 | End: 2024-02-26

## 2024-02-26 RX ADMIN — POLYETHYLENE GLYCOL 3350 17 GRAM(S): 17 POWDER, FOR SOLUTION ORAL at 06:51

## 2024-02-26 RX ADMIN — Medication 20 MILLIGRAM(S): at 06:52

## 2024-02-26 RX ADMIN — Medication 30 MILLILITER(S): at 06:54

## 2024-02-26 RX ADMIN — LACTULOSE 10 GRAM(S): 10 SOLUTION ORAL at 06:53

## 2024-02-26 RX ADMIN — ONDANSETRON 4 MILLIGRAM(S): 8 TABLET, FILM COATED ORAL at 06:55

## 2024-02-26 NOTE — ED PROVIDER NOTE - CLINICAL SUMMARY MEDICAL DECISION MAKING FREE TEXT BOX
83-year-old male, past medical history of diverticulosis, hypertension, IBD, IBS, BPH, stage III kidney disease presents to ED Complaining of lower abdominal cramping in the setting of constipation.  Patient states she has not had a bowel movement in 4 days though a prior note from an evaluation yesterday in our emergency department states he had diarrhea.  Old chart review shows patient is a high utilizer of the ER with daily visits at times.  Patient has been evaluated for the same complaint 5 days within the last week.  Has had normal blood work, x-ray, CT Abdo.  No change in his symptoms today.    On examination patient seems disheveled and unkempt and anxious.  Lung sounds are normal heart sounds are normal abdomen is nontender soft benign with no rebound or guarding.  Normal neurological exam with no focal deficits.    MDM will provide symptomatic treatment for concern of constipation but otherwise will not pursue further workup as patient has been worked up several times in the last week.

## 2024-02-26 NOTE — ED PROVIDER NOTE - PATIENT PORTAL LINK FT
You can access the FollowMyHealth Patient Portal offered by [facility] by registering at the following website: [email or web address]. By joining [employer]’s FollowMyHealth portal, you will also be able to view your health information using other applications (apps) compatible with our system.

## 2024-02-26 NOTE — ED PROVIDER NOTE - COVID-19 ORDERING FACILITY
VSS. Uterus firm w/o massage. Bleeding continues to improve. Incision w/ pressure dressing clean, dry, and intact. Pt ambulating independently. Voiding spontaneously, passing flatus. Pain managed adequately w/ medication. Plan of care reviewed w/ patient and spouse. No new concerns expressed at this time. Will continue to monitor and intervene as necessary.       [facility]

## 2024-02-26 NOTE — ED ADULT NURSE NOTE - NSFALLUNIVINTERV_ED_ALL_ED
Bed/Stretcher in lowest position, wheels locked, appropriate side rails in place/Call bell, personal items and telephone in reach/Instruct patient to call for assistance before getting out of bed/chair/stretcher/Non-slip footwear applied when patient is off stretcher/[place] to call system/Physically safe environment - no spills, clutter or unnecessary equipment/Purposeful proactive rounding/Room/bathroom lighting operational, light cord in reach

## 2024-02-26 NOTE — ED ADULT NURSE NOTE - OBJECTIVE STATEMENT
pt is an 83y male c/o abdominal pain. Pt reports pain d/t IBS flare up. States he has not had a BM in 4 days and did an enema w/out relief. Pt seen here frequently w/ same complaints.

## 2024-02-27 DIAGNOSIS — G89.29 OTHER CHRONIC PAIN: ICD-10-CM

## 2024-02-27 DIAGNOSIS — R10.30 LOWER ABDOMINAL PAIN, UNSPECIFIED: ICD-10-CM

## 2024-02-27 DIAGNOSIS — R11.0 NAUSEA: ICD-10-CM

## 2024-02-27 DIAGNOSIS — N18.9 CHRONIC KIDNEY DISEASE, UNSPECIFIED: ICD-10-CM

## 2024-02-27 DIAGNOSIS — I12.9 HYPERTENSIVE CHRONIC KIDNEY DISEASE WITH STAGE 1 THROUGH STAGE 4 CHRONIC KIDNEY DISEASE, OR UNSPECIFIED CHRONIC KIDNEY DISEASE: ICD-10-CM

## 2024-02-27 DIAGNOSIS — K58.9 IRRITABLE BOWEL SYNDROME WITHOUT DIARRHEA: ICD-10-CM

## 2024-02-27 DIAGNOSIS — R19.7 DIARRHEA, UNSPECIFIED: ICD-10-CM

## 2024-02-27 DIAGNOSIS — Z87.19 PERSONAL HISTORY OF OTHER DISEASES OF THE DIGESTIVE SYSTEM: ICD-10-CM

## 2024-02-28 DIAGNOSIS — K59.00 CONSTIPATION, UNSPECIFIED: ICD-10-CM

## 2024-02-28 DIAGNOSIS — N40.0 BENIGN PROSTATIC HYPERPLASIA WITHOUT LOWER URINARY TRACT SYMPTOMS: ICD-10-CM

## 2024-02-28 DIAGNOSIS — N18.30 CHRONIC KIDNEY DISEASE, STAGE 3 UNSPECIFIED: ICD-10-CM

## 2024-02-28 DIAGNOSIS — I12.9 HYPERTENSIVE CHRONIC KIDNEY DISEASE WITH STAGE 1 THROUGH STAGE 4 CHRONIC KIDNEY DISEASE, OR UNSPECIFIED CHRONIC KIDNEY DISEASE: ICD-10-CM

## 2024-02-28 DIAGNOSIS — R10.9 UNSPECIFIED ABDOMINAL PAIN: ICD-10-CM

## 2024-02-28 DIAGNOSIS — F41.9 ANXIETY DISORDER, UNSPECIFIED: ICD-10-CM

## 2024-02-28 DIAGNOSIS — Z87.19 PERSONAL HISTORY OF OTHER DISEASES OF THE DIGESTIVE SYSTEM: ICD-10-CM

## 2024-05-29 NOTE — ED ADULT NURSE NOTE - DOES PATIENT HAVE ADVANCE DIRECTIVE
[facility]  [facility] in [place], [place]      INR result reviewed with JORDEN Molina NP  who made the following recommendations (VORB): no changes and recheck 2 weeks. Patient's wife notified and verbalized understanding. They had no further questions. (See anticoag tracker)     Ellen Monge RN         No

## 2024-07-14 ENCOUNTER — EMERGENCY (EMERGENCY)
Facility: HOSPITAL | Age: 84
LOS: 1 days | Discharge: LEFT BEFORE DISCHARGE | End: 2024-07-14
Attending: EMERGENCY MEDICINE | Admitting: EMERGENCY MEDICINE
Payer: MEDICARE

## 2024-07-14 VITALS
RESPIRATION RATE: 16 BRPM | OXYGEN SATURATION: 95 % | DIASTOLIC BLOOD PRESSURE: 94 MMHG | SYSTOLIC BLOOD PRESSURE: 148 MMHG | HEART RATE: 80 BPM | HEIGHT: 70 IN | TEMPERATURE: 98 F | WEIGHT: 130.07 LBS

## 2024-07-14 DIAGNOSIS — Z90.89 ACQUIRED ABSENCE OF OTHER ORGANS: Chronic | ICD-10-CM

## 2024-07-14 DIAGNOSIS — Z98.890 OTHER SPECIFIED POSTPROCEDURAL STATES: Chronic | ICD-10-CM

## 2024-07-14 DIAGNOSIS — Z90.49 ACQUIRED ABSENCE OF OTHER SPECIFIED PARTS OF DIGESTIVE TRACT: Chronic | ICD-10-CM

## 2024-07-14 PROCEDURE — 99284 EMERGENCY DEPT VISIT MOD MDM: CPT

## 2024-07-14 RX ORDER — MAGNESIUM CITRATE 1.75 G/29.6ML
296 LIQUID ORAL ONCE
Refills: 0 | Status: COMPLETED | OUTPATIENT
Start: 2024-07-14 | End: 2024-07-14

## 2024-07-14 RX ADMIN — MAGNESIUM CITRATE 296 MILLILITER(S): 1.75 LIQUID ORAL at 12:32

## 2024-07-18 DIAGNOSIS — I12.9 HYPERTENSIVE CHRONIC KIDNEY DISEASE WITH STAGE 1 THROUGH STAGE 4 CHRONIC KIDNEY DISEASE, OR UNSPECIFIED CHRONIC KIDNEY DISEASE: ICD-10-CM

## 2024-07-18 DIAGNOSIS — K52.9 NONINFECTIVE GASTROENTERITIS AND COLITIS, UNSPECIFIED: ICD-10-CM

## 2024-07-18 DIAGNOSIS — K59.00 CONSTIPATION, UNSPECIFIED: ICD-10-CM

## 2024-07-18 DIAGNOSIS — Z87.19 PERSONAL HISTORY OF OTHER DISEASES OF THE DIGESTIVE SYSTEM: ICD-10-CM

## 2024-07-18 DIAGNOSIS — N40.0 BENIGN PROSTATIC HYPERPLASIA WITHOUT LOWER URINARY TRACT SYMPTOMS: ICD-10-CM

## 2024-07-18 DIAGNOSIS — Z53.29 PROCEDURE AND TREATMENT NOT CARRIED OUT BECAUSE OF PATIENT'S DECISION FOR OTHER REASONS: ICD-10-CM

## 2024-07-18 DIAGNOSIS — N18.9 CHRONIC KIDNEY DISEASE, UNSPECIFIED: ICD-10-CM

## 2024-07-20 ENCOUNTER — EMERGENCY (EMERGENCY)
Facility: HOSPITAL | Age: 84
LOS: 1 days | Discharge: LEFT BEFORE DISCHARGE | End: 2024-07-20
Admitting: EMERGENCY MEDICINE
Payer: MEDICARE

## 2024-07-20 VITALS
RESPIRATION RATE: 18 BRPM | WEIGHT: 169.98 LBS | SYSTOLIC BLOOD PRESSURE: 167 MMHG | HEART RATE: 91 BPM | HEIGHT: 70 IN | TEMPERATURE: 98 F | OXYGEN SATURATION: 97 % | DIASTOLIC BLOOD PRESSURE: 81 MMHG

## 2024-07-20 DIAGNOSIS — K59.00 CONSTIPATION, UNSPECIFIED: ICD-10-CM

## 2024-07-20 DIAGNOSIS — Z53.21 PROCEDURE AND TREATMENT NOT CARRIED OUT DUE TO PATIENT LEAVING PRIOR TO BEING SEEN BY HEALTH CARE PROVIDER: ICD-10-CM

## 2024-07-20 DIAGNOSIS — Z90.49 ACQUIRED ABSENCE OF OTHER SPECIFIED PARTS OF DIGESTIVE TRACT: Chronic | ICD-10-CM

## 2024-07-20 DIAGNOSIS — Z98.890 OTHER SPECIFIED POSTPROCEDURAL STATES: Chronic | ICD-10-CM

## 2024-07-20 DIAGNOSIS — Z90.89 ACQUIRED ABSENCE OF OTHER ORGANS: Chronic | ICD-10-CM

## 2024-07-20 PROCEDURE — L9991: CPT

## 2024-07-30 NOTE — ED ADULT TRIAGE NOTE - GLASGOW COMA SCALE: BEST VERBAL RESPONSE, MLM
(V5) oriented Criss Rascon (mother, [phone]) Criss Rascon (mother) Criss Rascon (mother, [phone])

## 2024-08-14 ENCOUNTER — EMERGENCY (EMERGENCY)
Facility: HOSPITAL | Age: 84
LOS: 1 days | Discharge: LEFT BEFORE DISCHARGE | End: 2024-08-14
Admitting: EMERGENCY MEDICINE
Payer: MEDICARE

## 2024-08-14 VITALS
SYSTOLIC BLOOD PRESSURE: 143 MMHG | HEIGHT: 70 IN | HEART RATE: 74 BPM | DIASTOLIC BLOOD PRESSURE: 100 MMHG | OXYGEN SATURATION: 97 % | TEMPERATURE: 98 F | RESPIRATION RATE: 18 BRPM

## 2024-08-14 DIAGNOSIS — Z90.89 ACQUIRED ABSENCE OF OTHER ORGANS: Chronic | ICD-10-CM

## 2024-08-14 DIAGNOSIS — Z53.21 PROCEDURE AND TREATMENT NOT CARRIED OUT DUE TO PATIENT LEAVING PRIOR TO BEING SEEN BY HEALTH CARE PROVIDER: ICD-10-CM

## 2024-08-14 DIAGNOSIS — R10.9 UNSPECIFIED ABDOMINAL PAIN: ICD-10-CM

## 2024-08-14 DIAGNOSIS — Z90.49 ACQUIRED ABSENCE OF OTHER SPECIFIED PARTS OF DIGESTIVE TRACT: Chronic | ICD-10-CM

## 2024-08-14 DIAGNOSIS — Z98.890 OTHER SPECIFIED POSTPROCEDURAL STATES: Chronic | ICD-10-CM

## 2024-08-14 PROCEDURE — L9991: CPT

## 2024-08-14 NOTE — ED ADULT TRIAGE NOTE - CHIEF COMPLAINT QUOTE
Walk in pt with complaints of abdominal pain and bloating 'since the early morning hours' after taking both stool softeners and laxatives.

## 2024-08-20 NOTE — ED ADULT NURSE NOTE - NS ED NURSE LEVEL OF CONSCIOUSNESS MENTAL STATUS
Orders Placed This Encounter    Semaglutide,0.25 or 0.5MG/DOS, (OZEMPIC, 0.25 OR 0.5 MG/DOSE,) 2 MG/3ML SOPN     Sig: Inject 0.5 mg into the skin once a week     Dispense:  3 mL     Refill:  1     Encounter Diagnoses   Name Primary?    Type 2 diabetes mellitus with diabetic nephropathy, without long-term current use of insulin (HCC) Yes    Morbid obesity (HCC)        See [employer] CRM messages for additional detail.  
Awake/Alert/Cooperative

## 2024-08-27 NOTE — ED ADULT TRIAGE NOTE - AS TEMP SITE
Provider Staff:  Action required for this patient    Requires labs      Please see care gap opportunities below in Care Due Message.    Thanks!  [facility]     Appointments      Date Provider   Last Visit   12/18/2023 Jacek Mohr MD   Next Visit   9/19/2024 Jacek Mohr MD     Refill Decision Note   Santhosh Goff  is requesting a refill authorization.  Brief Assessment and Rationale for Refill:  Approve     Medication Therapy Plan: A1C      Comments:     Note composed:10:08 AM 08/27/2024               
Care Due:                  Date            Visit Type   Department     Provider  --------------------------------------------------------------------------------                                EP -                              PRIMARY      [facility] INTERNAL  Last Visit: 12-      CARE ([facility])   MEDICINE       Jacek Mohr                              EP -                              PRIMARY      [facility] INTERNAL  Next Visit: 09-      CARE ([facility])   MEDICINE       Jacek Mohr                                                            Last  Test          Frequency    Reason                     Performed    Due Date  --------------------------------------------------------------------------------    HBA1C.......  6 months...  FARXIGA, glimepiride,      03- 09-                             metFORMIN................    [facility] Embedded Care Due Messages. Reference number: [id number].   8/27/2024 7:01:56 AM CDT  
Christiano A1c before his upcoming visit so we have the results.  
forehead

## 2024-11-18 NOTE — ED ADULT NURSE NOTE - NSFALLRSKPSTHSTOCCUR_ED_ALL_ED
Detail Level: Zone
Physiological Fall
Continue Regimen: I recommended a broad spectrum sunscreen with a SPF of 30 or higher.  Sunscreens should be applied at least 15 minutes prior to expected sun exposure and then every 2 hours after that as long as sun exposure continues. If swimming or exercising sunscreen should be reapplied every 45 minutes to an hour after getting wet or sweating.  I also recommended a lip balm with a sunscreen as well. Sun protective clothing can be used in lieu of sunscreen but must be worn the entire time you are exposed to the sun's rays. Suggested daily use of SPF on the face.

## 2025-02-03 NOTE — ED PROVIDER NOTE - CCCP TRG CHIEF CMPLNT
Mr. Porter is a 48-year-old male admitted for respiratory distress likely secondary to pneumonia.  He was started on IV vancomycin and cefepime.  Respiratory viral panel negative.  Blood cultures and sputum cultures were closely followed. He also had episodes of hypertensive urgency that required IV labetalol.  Patient continued to have tachycardia, tachypnea and hypoxia with respiratory distress and CTA PE protocol was ordered that was negative for PE.  Also had episodes of hyperglycemia requiring adjustment in insulin regimen.  Patient was initiated on lisinopril 10 mg p.o. q.day for elevated blood pressure.  Patient was extensively counseled regarding dangers of tobacco cigarette smoking and smoking cessation counseling performed. Patient received three days of cefepime and Vancomycin changed to PO Levaquin. PO prednisone was started.   Home oxygen eval qualify him for 4 L oxygen on ambulation. Patient was recommended to quit smoking, and nicotine patches were given.   
abdominal pain

## 2025-07-02 NOTE — ED ADULT TRIAGE NOTE - ARRIVAL FROM
Thank you for choosing [facility] Podiatry / Foot & Ankle Surgery!    DR. ADAMS'S CLINIC LOCATIONS:     [facility] TRIAGE LINE: [phone]   [address] APPOINTMENTS: [phone]   [place] [address] RADIOLOGY: [phone]   (Every other Tues - Wed - Fri PM) SET UP SURGERY: [phone]    PHYSICAL THERAPY: [phone]   [facility] BILLING QUESTIONS: [phone] [address] FAX: [phone]   [place] [address]    (Thurs & Fri AM)    - What is an ingrown toenail? An ingrown toenail is a medical condition usually caused by a nail edge irritating the neighboring soft tissue and skin. It can cause pain and lead to infection.  - What causes ingrown toenails? There are likely multiple causes of ingrown toenails, including nail shape and width, narrow shoes, a person s activity level, and likely family history of nail problems.  - Risks of not treating condition: If left untreated, some people endure ongoing tenderness and pain. The biggest concern is infection from bacteria entering through the damage soft tissue.  - How is it treated? Some people achieve relief by soaking their feet and trimming the edge of the nail. If an infection has developed, then an oral antibiotic can be prescribed, but the condition often returns after the course of the medication is completed. Often self treatment is not successful and treatment by a qualified medical provider is needed. This often involves numbing the toe with a local anesthetic and then either removing the edge of a nail or the entire nail.  - Risks of surgery? As with any form of surgery, infections is a risk. However, a person is probably at greater risk for infection if the ingrown toenail is left untreated. Nail removal is a common, simple, and fairly quick procedure that in most cases is done in the physician's office. If an infection exists, often the only treatment that is successful is removal.         DR. ADAMS'S  RECOMMENDATIONS FOR HEALING AFTER A NAIL REMOVAL PROCEDURE    1. Try to keep the bandage on until bedtime or the morning after your procedure.     2. Some bleeding is normal. If bleeding seems excessive to you, place ice on top of your foot for 15-20 minutes, elevate your foot above heart level and reinforce the dressing (add additional covering)    3. Over the counter pain medication (tylenol / ibuprofen), elevating your foot and cold application is all you should need for pain control. Pain is usually easily managed.      4. For 1-2 weeks, soak your foot twice a day in mild, skin friendly soap water solution for 15 minutes (dish soap, hand soap, body wash, etc). After soaking, blot the area dry and apply a regular band aid. An antibiotic ointment is not needed.  If the guaze dressing sticks to your toe, soak your foot for a few minutes with the dressing on. This should help loosen it.     6. It is normal to experience some discomfort and redness around the nail for several days following the procedure. Drainage will likely appear a red and/or yellow. This is normal. If your toe is still draining fluid after 2 weeks, continue soaking.    7. Initial discomfort might last for 2-3 days. You may resume with regular activity as soon as you want,  as long as you keep the wound clean, dry and follow the soaking instruction. It is recommended that you do not enter public swimming pools/hot tubs while your toe is draining.    8. If you are experiencing worsening pain and redness or notice pus after 2-3 days please contact the clinic. Ask to speak with a triage nurse and they will inform our team of your symptoms and we can advise if a follow up is needed.      IF YOU HAD A PERMANENT NAIL REMOVAL PROCEDURE    - Healing will take longer and you might need to soak for 2-3 weeks. You are healing from the nail being removed and the chemical burn.  - Expect some drainage, crust  and redness. This is from the acid (phenol) that  was applied and the chemical burn.  - After soaking, use a Q-tip to clean under and around the skin where the nail was removed. This helps get rid of the brownish material and helps the wound drain  - Sometimes it is hard to know if the redness and drainage is normal versus a developing infection. If in doubt, reach out to Dr. Shipley's office via My Chart or phone.   - If redness extends back to the middle of the toe, you should have it looked at in clinic.     After 2-3 days, if you are experiencing worsening pain and redness, or notice pus, please contact the clinic. Ask to speak with a triage nurse and they will inform our team of your symptoms and we can advise if a follow up is needed.    Home

## 2025-07-18 NOTE — ED ADULT NURSE NOTE - PAIN RATING/NUMBER SCALE (0-10): ACTIVITY
Pt needs appt for evaluation  Please call to assist    0 (no pain/absence of nonverbal indicators of pain)